# Patient Record
Sex: FEMALE | Race: WHITE | NOT HISPANIC OR LATINO | Employment: UNEMPLOYED | URBAN - METROPOLITAN AREA
[De-identification: names, ages, dates, MRNs, and addresses within clinical notes are randomized per-mention and may not be internally consistent; named-entity substitution may affect disease eponyms.]

---

## 2017-01-03 ENCOUNTER — TELEPHONE (OUTPATIENT)
Dept: FAMILY MEDICINE | Facility: CLINIC | Age: 62
End: 2017-01-03

## 2017-01-03 NOTE — TELEPHONE ENCOUNTER
"01/03/17      Patient declined will call back when ready    Canh \"Pushpa\" Usama  Central Scheduler    "

## 2017-01-11 ENCOUNTER — TELEPHONE (OUTPATIENT)
Dept: FAMILY MEDICINE | Facility: CLINIC | Age: 62
End: 2017-01-11

## 2017-01-11 DIAGNOSIS — G25.81 RESTLESS LEG: Primary | ICD-10-CM

## 2017-01-11 RX ORDER — GABAPENTIN 100 MG/1
CAPSULE ORAL
Qty: 10 CAPSULE | Refills: 0 | Status: SHIPPED | OUTPATIENT
Start: 2017-01-11 | End: 2017-08-24

## 2017-01-11 NOTE — TELEPHONE ENCOUNTER
Reason for Call:  Other prescription    Detailed comments: pt is wanting 5 days worth of medication sent to local pharmacy till her mail order can get her a larger supply   Requesting gabapentin (NEURONTIN) 100 MG capsule    Phone Number Patient can be reached at: Home number on file [phone] (home)    Best Time: any     Can we leave a detailed message on this number? YES    Call taken on 1/11/2017 at 12:46 PM by Candy Cabrera

## 2017-01-11 NOTE — TELEPHONE ENCOUNTER
Prescription approved per [facility] Refill Protocol. For 5 days only.     Left her a message to check with Cub for a refill she requested,   Shantell Julien RNC

## 2017-01-24 ENCOUNTER — OFFICE VISIT (OUTPATIENT)
Dept: OPHTHALMOLOGY | Facility: CLINIC | Age: 62
End: 2017-01-24
Attending: OPHTHALMOLOGY
Payer: MEDICARE

## 2017-01-24 DIAGNOSIS — G35 MULTIPLE SCLEROSIS (H): ICD-10-CM

## 2017-01-24 DIAGNOSIS — H52.13 MYOPIA WITH ASTIGMATISM AND PRESBYOPIA, BILATERAL: ICD-10-CM

## 2017-01-24 DIAGNOSIS — H52.4 MYOPIA WITH ASTIGMATISM AND PRESBYOPIA, BILATERAL: ICD-10-CM

## 2017-01-24 DIAGNOSIS — H52.203 MYOPIA WITH ASTIGMATISM AND PRESBYOPIA, BILATERAL: ICD-10-CM

## 2017-01-24 DIAGNOSIS — H47.393 CUP TO DISC ASYMMETRY, BILATERAL: ICD-10-CM

## 2017-01-24 DIAGNOSIS — B00.52 HERPES KERATITIS: Primary | ICD-10-CM

## 2017-01-24 DIAGNOSIS — H17.9 LEFT CORNEAL SCAR: ICD-10-CM

## 2017-01-24 DIAGNOSIS — H25.13 NUCLEAR SENILE CATARACT OF BOTH EYES: ICD-10-CM

## 2017-01-24 PROCEDURE — 99213 OFFICE O/P EST LOW 20 MIN: CPT | Mod: ZF

## 2017-01-24 PROCEDURE — 92133 CPTRZD OPH DX IMG PST SGM ON: CPT | Mod: ZF | Performed by: OPHTHALMOLOGY

## 2017-01-24 PROCEDURE — 92015 DETERMINE REFRACTIVE STATE: CPT | Mod: ZF

## 2017-01-24 ASSESSMENT — REFRACTION_WEARINGRX
OD_AXIS: 175
OS_CYLINDER: +1.25
OD_CYLINDER: +0.75
OS_ADD: +2.25
OD_ADD: +2.25
OS_AXIS: 175
OS_SPHERE: -7.25
OD_SPHERE: -8.25

## 2017-01-24 ASSESSMENT — SLIT LAMP EXAM - LIDS
COMMENTS: MILD BLEPHARITIS
COMMENTS: MILD BLEPHARITIS

## 2017-01-24 ASSESSMENT — REFRACTION_MANIFEST
OS_ADD: +2.50
OS_SPHERE: -9.00
OS_AXIS: 180
OD_AXIS: 167
OS_CYLINDER: +2.50
OD_CYLINDER: +0.75
OD_SPHERE: -8.00
OD_ADD: +2.50

## 2017-01-24 ASSESSMENT — TONOMETRY
IOP_METHOD: TONOPEN
OS_IOP_MMHG: 09
OD_IOP_MMHG: 10

## 2017-01-24 ASSESSMENT — EXTERNAL EXAM - LEFT EYE: OS_EXAM: NORMAL

## 2017-01-24 ASSESSMENT — CUP TO DISC RATIO
OS_RATIO: 0.5
OD_RATIO: 0.2

## 2017-01-24 ASSESSMENT — CONF VISUAL FIELD
OS_NORMAL: 1
OD_NORMAL: 1

## 2017-01-24 ASSESSMENT — EXTERNAL EXAM - RIGHT EYE: OD_EXAM: NORMAL

## 2017-01-24 ASSESSMENT — VISUAL ACUITY
CORRECTION_TYPE: GLASSES
OS_CC: 20/200
METHOD: SNELLEN - LINEAR
OD_CC: 20/30

## 2017-01-24 NOTE — NURSING NOTE
Chief Complaints and History of Present Illnesses   Patient presents with     New Patient     h/o of left eye herpes     HPI    Symptoms:     Blurred vision   No decreased vision   No floaters   No flashes   Dryness         Do you have eye pain now?:  No      Comments:  New (previous patient) patient is here for annual follow up with h/o left eye herpes.  The patient notes that her vision is stable.  The left eye has blurred vision since the herpes in 2008.  RAMESH Villa 7:56 AM 01/24/2017

## 2017-01-24 NOTE — PROGRESS NOTES
HPI: Tonya Rodriguez is a 61 year old female who presents for complete eye examination. Her last eye exam was in 2008. She denies any changes in her vision, eye pain, redness, new flashes/floaters.    POHx:  HSV keratitis  Optic neuritis    Current Eye Medications:   Valtrex 500 mg daily    Review of Testing:  NA    Assessment & Plan   Tonya Rodriguez is a 61 year old female with the following diagnoses:     1. Herpes keratitis, left eye:  2. Cornea scar, left eye     Patient lost to follow up 7 years ago. Currently taking Valtrex 500 mg qDay.  Patient without active keratitis or stromal involvement.  Discussed referral to cornea service for discussion of possible PKP, but patient deferred today   Continue valtrex as above    3. Myopia with astigmatism and presbyopia, bilateral  Discussed and dispensed new MRx  Discussed RD precautions    4. Nuclear senile sclerosis, bilateral:  Visually non-significant  -Continue to monitor    5. Asymmetric C:D ratio:    History of optic neuritis left eye in the past  No pallor seen, but view limited with K scar  rNFL OCT today for baseline  Intraocular pressure within normal limits today and in the past  Will recheck in 6 mo      Diego Chwodary MD  Ophthalmology, PGY-2         Attending Physician Attestation:  I have seen and examined this patient.  I have confirmed and edited as necessary the chief complaint(s), history of present illness, review of systems, relevant history, and examination findings as documented by others.  I have personally reviewed the relevant tests, images, and reports as documented above.  I have confirmed and edited as necessary the assessment and plan and agree with this note.  - Kush Gutierrez MD 10:52 AM 1/24/2017

## 2017-01-26 NOTE — TELEPHONE ENCOUNTER
Imipramine     Last Written Prescription Date: 10/23/15  Last Fill Quantity: 90, # refills: 3  Last Office Visit with G, P or [facility] prescribing provider: 12/08/16        BP Readings from Last 3 Encounters:   12/08/16 110/69   11/26/16 130/70   10/23/15 113/53     Last PHQ-9 score on record=   PHQ-9 SCORE 12/8/2016   Total Score -   Total Score 19

## 2017-01-30 RX ORDER — IMIPRAMINE HCL 50 MG
50 TABLET ORAL AT BEDTIME
Qty: 90 TABLET | Refills: 3 | OUTPATIENT
Start: 2017-01-30

## 2017-01-30 NOTE — TELEPHONE ENCOUNTER
Routing refill request to provider for review/approval because:  PHQ-9 is above 5 for RN protocol    Thank you  Elizabeth UNDERWOOD RN

## 2017-03-27 ENCOUNTER — TRANSFERRED RECORDS (OUTPATIENT)
Dept: HEALTH INFORMATION MANAGEMENT | Facility: CLINIC | Age: 62
End: 2017-03-27

## 2017-05-02 DIAGNOSIS — G47.00 INSOMNIA: ICD-10-CM

## 2017-05-02 NOTE — TELEPHONE ENCOUNTER
Imipramine  Last Written Prescription Date: 10/23/2015  Last Fill Quantity: 90, # refills: 3  Last Office Visit with [facility], [facility] or [facility] prescribing provider: 12/08/2016        BP Readings from Last 3 Encounters:   12/08/16 110/69   11/26/16 130/70   10/23/15 113/53     Last PHQ-9 score on record=   PHQ-9 SCORE 12/8/2016   Total Score -   Total Score 19

## 2017-05-03 RX ORDER — IMIPRAMINE HCL 50 MG
50 TABLET ORAL AT BEDTIME
Qty: 90 TABLET | Refills: 0 | Status: SHIPPED | OUTPATIENT
Start: 2017-05-03 | End: 2019-01-16

## 2017-05-05 DIAGNOSIS — F33.42 RECURRENT MAJOR DEPRESSION IN COMPLETE REMISSION (H): ICD-10-CM

## 2017-05-05 NOTE — TELEPHONE ENCOUNTER
Fluoxetine     Last Written Prescription Date: 12/26/2016  Last Fill Quantity: 30, # refills: 1  Last Office Visit with [facility] primary care provider:  12/8/2016        Last PHQ-9 score on record=   PHQ-9 SCORE 12/8/2016   Total Score -   Total Score 19

## 2017-05-08 RX ORDER — FLUOXETINE 40 MG/1
40 CAPSULE ORAL DAILY
Qty: 30 CAPSULE | Refills: 1 | Status: SHIPPED | OUTPATIENT
Start: 2017-05-08 | End: 2018-03-23

## 2017-05-09 ENCOUNTER — TELEPHONE (OUTPATIENT)
Dept: FAMILY MEDICINE | Facility: CLINIC | Age: 62
End: 2017-05-09

## 2017-05-09 NOTE — TELEPHONE ENCOUNTER
PHQ9 Due by:7/8/17    Please contact patient to complete follow up PHQ9 before their DUE DATE.     This is important feedback for your care team to monitor how you are doing while taking Prozac.     You completed this same questionnaire   PHQ-9 SCORE 12/8/2016   Total Score -   Total Score 19       MA STAFF: If upon calling patient and PHQ9 score is higher that 10 route to the provider. You may also seek an RN for review.

## 2017-05-11 ASSESSMENT — ANXIETY QUESTIONNAIRES
2. NOT BEING ABLE TO STOP OR CONTROL WORRYING: NOT AT ALL
6. BECOMING EASILY ANNOYED OR IRRITABLE: NOT AT ALL
1. FEELING NERVOUS, ANXIOUS, OR ON EDGE: NOT AT ALL
IF YOU CHECKED OFF ANY PROBLEMS ON THIS QUESTIONNAIRE, HOW DIFFICULT HAVE THESE PROBLEMS MADE IT FOR YOU TO DO YOUR WORK, TAKE CARE OF THINGS AT HOME, OR GET ALONG WITH OTHER PEOPLE: NOT DIFFICULT AT ALL
3. WORRYING TOO MUCH ABOUT DIFFERENT THINGS: NOT AT ALL
5. BEING SO RESTLESS THAT IT IS HARD TO SIT STILL: NOT AT ALL
7. FEELING AFRAID AS IF SOMETHING AWFUL MIGHT HAPPEN: NOT AT ALL
GAD7 TOTAL SCORE: 0

## 2017-05-11 ASSESSMENT — PATIENT HEALTH QUESTIONNAIRE - PHQ9: 5. POOR APPETITE OR OVEREATING: NOT AT ALL

## 2017-05-11 NOTE — TELEPHONE ENCOUNTER
Panel Management Review      Patient has the following on her problem list:     Depression / Dysthymia review  PHQ-9 SCORE 1/14/2014 12/8/2016 5/11/2017   Total Score 2 - -   Total Score - 19 3      Patient is due for:  PHQ9      Composite cancer screening  Chart review shows that this patient is due/due soon for the following None  Summary:    Patient is due/failing the following:   PHQ9    Action needed:   Patient needs to do PHQ9.    Type of outreach:    Phone, spoke to patient.  Completed Phq9/Fahad on 5/11/2017    Questions for provider review:    PLEASE SEND REFILL FOR PROZAC 40 MG CAPSULE TO [employer].  90 dispensed with 3 refills.                                                                                                                                    Alannah Sims CMA (AAMA) 1:43 PM 5/11/2017         Chart routed to Provider .

## 2017-05-12 ASSESSMENT — PATIENT HEALTH QUESTIONNAIRE - PHQ9: SUM OF ALL RESPONSES TO PHQ QUESTIONS 1-9: 3

## 2017-05-12 ASSESSMENT — ANXIETY QUESTIONNAIRES: GAD7 TOTAL SCORE: 0

## 2017-05-12 NOTE — TELEPHONE ENCOUNTER
Patient was instructed to see the provider.  Patient will call back to schedule.    Elizabeth UNDERWOOD RN

## 2017-07-10 ENCOUNTER — OFFICE VISIT (OUTPATIENT)
Dept: FAMILY MEDICINE | Facility: CLINIC | Age: 62
End: 2017-07-10
Payer: COMMERCIAL

## 2017-07-10 VITALS
HEIGHT: 69 IN | BODY MASS INDEX: 23.95 KG/M2 | HEART RATE: 71 BPM | OXYGEN SATURATION: 99 % | WEIGHT: 161.7 LBS | SYSTOLIC BLOOD PRESSURE: 107 MMHG | DIASTOLIC BLOOD PRESSURE: 75 MMHG | TEMPERATURE: 98.3 F

## 2017-07-10 DIAGNOSIS — R21 RASH: Primary | ICD-10-CM

## 2017-07-10 PROCEDURE — 99213 OFFICE O/P EST LOW 20 MIN: CPT | Performed by: INTERNAL MEDICINE

## 2017-07-10 RX ORDER — TRIAMCINOLONE ACETONIDE 1 MG/G
CREAM TOPICAL
Qty: 30 G | Refills: 1 | Status: SHIPPED | OUTPATIENT
Start: 2017-07-10 | End: 2018-03-23

## 2017-07-10 NOTE — PROGRESS NOTES
SUBJECTIVE:                                                    Tonya Rodriguez is a 61 year old female who presents to clinic today for the following health issues:  Chief Complaint   Patient presents with     Shingles     x 3 days, rash mostly on chest/trunk      Rash      Duration: x 3 days     Description  Location: random red spots/sores on chest/trunk.  Patient concerns it could be shingles.    Itching: mild    Intensity:  mild    Accompanying signs and symptoms: burning at the onset.      History (similar episodes/previous evaluation): None    Precipitating or alleviating factors:  New exposures:  None  Recent travel: no      Therapies tried and outcome: none and calamine lotion      Tonya initially developed a spot above the left breast a few days ago and has since developed a few more spots- on in the right groin and a couple on the right abdomen.  They start slightly burning but then get really itchy.      Problem list and histories reviewed & adjusted, as indicated.  Additional history: as documented    Current Outpatient Prescriptions   Medication Sig Dispense Refill     triamcinolone (KENALOG) 0.1 % cream Apply sparingly to affected area three times daily for 14 days. 30 g 1     FLUoxetine (PROZAC) 40 MG capsule Take 1 capsule (40 mg) by mouth daily 30 capsule 1     imipramine (TOFRANIL) 50 MG tablet Take 1 tablet (50 mg) by mouth At Bedtime 90 tablet 0     gabapentin (NEURONTIN) 100 MG capsule Take 1-2 capsules by mouth every night at bedtime as needed for restless legs. 10 capsule 0     simvastatin (ZOCOR) 20 MG tablet Take 1 tablet (20 mg) by mouth At Bedtime 90 tablet 3     valACYclovir (VALTREX) 500 MG tablet Take 1 tablet (500 mg) by mouth daily 90 tablet 3     oxybutynin chloride (DITROPAN XL) 15 MG TB24 Take 1 tablet (15 mg) by mouth 2 times daily 180 tablet 1     traZODone (DESYREL) 50 MG tablet Take 1 tablet by mouth every other night at bedtime. 30 tablet 0     albuterol (PROAIR  "HFA/PROVENTIL HFA/VENTOLIN HFA) 108 (90 BASE) MCG/ACT Inhaler Inhale 2 puffs into the lungs every 6 hours as needed for shortness of breath / dyspnea or wheezing 1 Inhaler 1     ORDER FOR DME Equipment being ordered: urinary catheter 6 times daily 540 catheter 3     [DISCONTINUED] oxybutynin (DITROPAN-XL) 10 MG 24 hr tablet Take 3 tablets (30 mg) by mouth At Bedtime 90 tablet 3     Allergies   Allergen Reactions     Cipro [Ciprofloxacin] Rash     gets yeast infections - BAD with.     Lactulose GI Disturbance     Caused Vomiting       Reviewed and updated as needed this visit by clinical staff  Tobacco  Allergies  Med Hx  Surg Hx  Fam Hx  Soc Hx      Reviewed and updated as needed this visit by Provider         ROS:  Constitutional, HEENT, derm systems are negative, except as otherwise noted.    OBJECTIVE:     /75 (BP Location: Left arm, Patient Position: Chair, Cuff Size: Adult Regular)  Pulse 71  Temp 98.3  F (36.8  C) (Tympanic)  Ht 5' 9\" (1.753 m)  Wt 161 lb 11.2 oz (73.3 kg)  SpO2 99%  BMI 23.88 kg/m2  Body mass index is 23.88 kg/(m^2).  GENERAL: healthy, alert and no distress  SKIN: cluster of papules with one vesicle and one pustule above the left breast.  Papular cluster in right groin and two on the right abdomen.  No facial rash or rash on limbs.     ASSESSMENT/PLAN:       1. Rash    Etiology unclear.  Possible insect bites due to scattered distribution- I advised checking for insects in the home.  She was concerned about shingles, but it is not consistent with that due to distributions.  Does not appear consistent with chicken pox, which she did already have as a child.  She is on Valtrex daily for suppression of herpes infection in the eye.  Does not appear consistent with measles, and she has not traveled to any areas where the measles outbreak is occurring.  Doesn't appears fungal or allergic.  Will treat symptomatically with triamcinolone and OTC antihistamine and continue to monitor.  " If not improving or new symptoms develop, follow-up for further evaluation.      - triamcinolone (KENALOG) 0.1 % cream; Apply sparingly to affected area three times daily for 14 days.  Dispense: 30 g; Refill: 1    Dayne Taylor MD  [facility]

## 2017-07-10 NOTE — NURSING NOTE
"Chief Complaint   Patient presents with     Shingles     x 3 days, rash mostly on chest/trunk        Initial /75 (BP Location: Left arm, Patient Position: Chair, Cuff Size: Adult Regular)  Pulse 71  Temp 98.3  F (36.8  C) (Tympanic)  Ht 5' 9\" (1.753 m)  Wt 161 lb 11.2 oz (73.3 kg)  SpO2 99%  BMI 23.88 kg/m2 Estimated body mass index is 23.88 kg/(m^2) as calculated from the following:    Height as of this encounter: 5' 9\" (1.753 m).    Weight as of this encounter: 161 lb 11.2 oz (73.3 kg).  Medication Reconciliation: complete   Lashell PAZ CMA (AAMA)    "

## 2017-07-10 NOTE — PATIENT INSTRUCTIONS
Self-Care for Skin Rashes     Pat your skin dry. Do not rub.     When your skin reacts to a substance your body is sensitive to, it can cause a rash. You can treat most rashes at home by keeping the skin clean and dry. Many rashes may get better on their own within 2 to 3 days. You may need medical attention if your rash itches, drains, or hurts, particularly if the rash is getting worse.  What can cause a skin rash?    Sun poisoning, caused by too much exposure to the sun    An irritant or allergic reaction to a certain type of food, plant, or chemical, such as  shellfish, poison ivy, and or cleaning products    An infection caused by a fungus (ringworm), virus (chickenpox), or bacteria (strep)    Bites or infestation caused by insects or pests, such as ticks, lice, or mites    Dry skin, which is often seen during the winter months and in older people  How can I control itching and skin damage?    Take soothing lukewarm baths in a colloidal oatmeal product. You can buy this at the [employer].    Do your best not to scratch. Clip fingernails short, especially in young children, to reduce skin damage if scratching does occur.    Use moisturizing skin lotion instead of scratching your dry skin.    Use sunscreen whenever going out into direct sun.    Use only mild cleansing agents whenever possible.    Wash with mild, nonirritating soap and warm water.    Wear clothing that breathes, such as cotton shirts or canvas shoes.    If fluid is seeping from the rash, cover it loosely with clean gauze to absorb the discharge.    Many rashes are contagious. Prevent the rash from spreading to others by washing your hands often before or after touching others with any skin rash.  Use medicine    Antihistamines such as diphenhydramine can help control itching. But use with caution because they can make you drowsy.    Using over-the-counter hydrocortisone cream on small rashes may help reduce swelling and itching    Most  over-the-counter antifungal medicines can treat athlete s foot and many other fungal infections of the skin.  Check with your healthcare provider  Call your healthcare provider if:    You were told that you have a fungal infection on your skin to make sure you have the correct type of medicine.    You have questions or concerns about medicines or their side effects.     Call 911  Call 911 if either of these occur:    Your tongue or lips start to swell    You have difficulty breathing      Call your healthcare provider  Call your healthcare provider if any of these occur:    Temperature of more than 101.0 F (38.3 C), or as directed    Sore throat, a cough, or unusual fatigue    Red, oozy, or painful rash gets worse. These are signs of infection.    Rash covers your face, genitals, or most of your body    Crusty sores or red rings that begin to spread    You were exposed to someone who has a contagious rash, such as scabies or lice.    Red bull s-eye rash with a white center (a sign of Lyme disease)    You were told that you have resistant bacteria (MRSA) on your skin.   Date Last Reviewed: 5/12/2015 2000-2017 [employer]. [address] [place] [address]. All rights reserved. This information is not intended as a substitute for professional medical care. Always follow your healthcare professional's instructions.

## 2017-08-24 ENCOUNTER — OFFICE VISIT (OUTPATIENT)
Dept: FAMILY MEDICINE | Facility: CLINIC | Age: 62
End: 2017-08-24
Payer: COMMERCIAL

## 2017-08-24 ENCOUNTER — TELEPHONE (OUTPATIENT)
Dept: FAMILY MEDICINE | Facility: CLINIC | Age: 62
End: 2017-08-24

## 2017-08-24 VITALS
HEART RATE: 60 BPM | BODY MASS INDEX: 23.99 KG/M2 | SYSTOLIC BLOOD PRESSURE: 116 MMHG | WEIGHT: 162 LBS | TEMPERATURE: 97.6 F | HEIGHT: 69 IN | DIASTOLIC BLOOD PRESSURE: 60 MMHG

## 2017-08-24 DIAGNOSIS — F33.42 RECURRENT MAJOR DEPRESSION IN COMPLETE REMISSION (H): ICD-10-CM

## 2017-08-24 DIAGNOSIS — B00.52 HERPES KERATITIS: ICD-10-CM

## 2017-08-24 DIAGNOSIS — E78.2 MIXED HYPERLIPIDEMIA: ICD-10-CM

## 2017-08-24 DIAGNOSIS — G25.81 RESTLESS LEG: ICD-10-CM

## 2017-08-24 DIAGNOSIS — G35 MULTIPLE SCLEROSIS (H): Primary | ICD-10-CM

## 2017-08-24 PROCEDURE — 99214 OFFICE O/P EST MOD 30 MIN: CPT | Performed by: FAMILY MEDICINE

## 2017-08-24 RX ORDER — FLUOXETINE 10 MG/1
10 CAPSULE ORAL DAILY
Qty: 45 CAPSULE | Refills: 0 | Status: SHIPPED | OUTPATIENT
Start: 2017-08-24 | End: 2017-08-24

## 2017-08-24 RX ORDER — IMIPRAMINE HCL 50 MG
50 TABLET ORAL AT BEDTIME
Qty: 90 TABLET | Refills: 0 | Status: CANCELLED | OUTPATIENT
Start: 2017-08-24

## 2017-08-24 RX ORDER — TRAZODONE HYDROCHLORIDE 50 MG/1
TABLET, FILM COATED ORAL
Qty: 30 TABLET | Refills: 0 | Status: CANCELLED | OUTPATIENT
Start: 2017-08-24

## 2017-08-24 RX ORDER — SIMVASTATIN 20 MG
20 TABLET ORAL AT BEDTIME
Qty: 90 TABLET | Refills: 3 | Status: SHIPPED | OUTPATIENT
Start: 2017-08-24 | End: 2019-05-09

## 2017-08-24 RX ORDER — OXYBUTYNIN CHLORIDE 15 MG/1
15 TABLET, EXTENDED RELEASE ORAL
Qty: 180 TABLET | Refills: 1 | Status: CANCELLED | OUTPATIENT
Start: 2017-08-24

## 2017-08-24 RX ORDER — VALACYCLOVIR HYDROCHLORIDE 500 MG/1
500 TABLET, FILM COATED ORAL DAILY
Qty: 90 TABLET | Refills: 3 | Status: SHIPPED | OUTPATIENT
Start: 2017-08-24 | End: 2018-03-23

## 2017-08-24 RX ORDER — FLUOXETINE 10 MG/1
10 CAPSULE ORAL DAILY
Qty: 135 CAPSULE | Refills: 3 | Status: SHIPPED | OUTPATIENT
Start: 2017-08-24 | End: 2017-08-24

## 2017-08-24 RX ORDER — FLUOXETINE 10 MG/1
10 CAPSULE ORAL EVERY OTHER DAY
Qty: 45 CAPSULE | Refills: 0 | Status: SHIPPED | OUTPATIENT
Start: 2017-08-24 | End: 2017-08-25

## 2017-08-24 RX ORDER — FLUOXETINE 40 MG/1
40 CAPSULE ORAL DAILY
Qty: 30 CAPSULE | Refills: 1 | Status: CANCELLED | OUTPATIENT
Start: 2017-08-24

## 2017-08-24 RX ORDER — GABAPENTIN 100 MG/1
CAPSULE ORAL
Qty: 180 CAPSULE | Refills: 3 | Status: SHIPPED | OUTPATIENT
Start: 2017-08-24 | End: 2019-01-16

## 2017-08-24 ASSESSMENT — PATIENT HEALTH QUESTIONNAIRE - PHQ9: SUM OF ALL RESPONSES TO PHQ QUESTIONS 1-9: 3

## 2017-08-24 NOTE — MR AVS SNAPSHOT
After Visit Summary   8/24/2017    Tonya Rodriguez    MRN: [id number]           Patient Information     Date Of Birth          1955        Visit Information        Provider Department      8/24/2017 9:20 AM Yasmin Crowell MD [facility]        Today's Diagnoses     Recurrent major depression in complete remission (H)        Restless leg        Mixed hyperlipidemia        Herpes keratitis          Care Instructions          Thank you for choosing [facility].  You may be receiving a survey in the mail from [facility] regarding your visit today.  Please take a few minutes to complete and return the survey to let us know how we are doing.      If you have questions or concerns, please contact us via [employer] or you can contact your care team at [phone].    Our Clinic hours are:  Monday 6:40 am  to 7:00 pm  Tuesday -Friday 6:40 am to 5:00 pm    The [place] outpatient lab hours are:  Monday - Friday 6:10 am to 4:45 pm  Saturdays 7:00 am to 11:00 am  Appointments are required, call [phone]    If you have clinical questions after hours or would like to schedule an appointment,  call the clinic at [phone].  The 10-year ASCVD risk score (Rhett AUGUSTA Jr, et al., 2013) is: 6.8%    Values used to calculate the score:      Age: 62 years      Sex: Female      Is Non- : No      Diabetic: No      Tobacco smoker: Yes      Systolic Blood Pressure: 116 mmHg      Is BP treated: No      HDL Cholesterol: 48 mg/dL      Total Cholesterol: 183 mg/dL            Follow-ups after your visit        Who to contact     If you have questions or need follow up information about today's clinic visit or your schedule please contact [facility] directly at [phone].  Normal or non-critical lab and imaging results will be communicated to you by MyChart, letter or phone within 4 business days after the clinic has received the results. If you do not hear from  "us within 7 days, please contact the clinic through Private[employer]Me or phone. If you have a critical or abnormal lab result, we will notify you by phone as soon as possible.  Submit refill requests through Private[employer]Me or call your pharmacy and they will forward the refill request to us. Please allow 3 business days for your refill to be completed.          Additional Information About Your Visit        [employer] Information     Private.Me lets you send messages to your doctor, view your test results, renew your prescriptions, schedule appointments and more. To sign up, go to [email or web address] . Click on \"Log in\" on the left side of the screen, which will take you to the Welcome page. Then click on \"Sign up Now\" on the right side of the page.     You will be asked to enter the access code listed below, as well as some personal information. Please follow the directions to create your username and password.     Your access code is: [email or web address]  Expires: 10/8/2017 11:22 AM     Your access code will  in 90 days. If you need help or a new code, please call your [place] clinic or [phone].        Care EveryWhere ID     This is your Care EveryWhere ID. This could be used by other organizations to access your [place] medical records  [id number]        Your Vitals Were     Pulse Temperature Height BMI (Body Mass Index)          60 97.6  F (36.4  C) (Tympanic) 5' 9\" (1.753 m) 23.92 kg/m2         Blood Pressure from Last 3 Encounters:   17 116/60   07/10/17 107/75   16 110/69    Weight from Last 3 Encounters:   17 162 lb (73.5 kg)   07/10/17 161 lb 11.2 oz (73.3 kg)   16 165 lb 6.4 oz (75 kg)              Today, you had the following     No orders found for display         Today's Medication Changes          These changes are accurate as of: 17 10:00 AM.  If you have any questions, ask your nurse or doctor.               These medicines have changed or have updated prescriptions.        " Dose/Directions    * FLUoxetine 40 MG capsule   Commonly known as:  PROzac   This may have changed:  Another medication with the same name was added. Make sure you understand how and when to take each.   Used for:  Recurrent major depression in complete remission (H)        Dose:  40 mg   Take 1 capsule (40 mg) by mouth daily   Quantity:  30 capsule   Refills:  1       * FLUoxetine 10 MG capsule   Commonly known as:  PROzac   This may have changed:  You were already taking a medication with the same name, and this prescription was added. Make sure you understand how and when to take each.   Used for:  Recurrent major depression in complete remission (H)        Dose:  10 mg   Take 1 capsule (10 mg) by mouth daily Every other day will take 20 mg  (2 caps)   Quantity:  45 capsule   Refills:  0       * Notice:  This list has 2 medication(s) that are the same as other medications prescribed for you. Read the directions carefully, and ask your doctor or other care provider to review them with you.         Where to get your medicines      These medications were sent to [id number] - [place], [place] - [address]  [address] [address]     Phone:  [phone]     FLUoxetine 10 MG capsule         These medications were sent to [facility] [address] [facility]  [address] [facility] [address]     Phone:  [phone]     gabapentin 100 MG capsule    simvastatin 20 MG tablet    valACYclovir 500 MG tablet                Primary Care Provider Office Phone # Fax #    Yasmin Crowell [phone] [address] [facility] [address]        Equal Access to Services     [facility] Center[facility] : Deon Serrano, wajonathan lusharonda, qaybta jade hardin. So [facility] [phone].    ATENCIÓN: Si habla español, tiene a santiago disposición servicios  soledad de asistencia lingüística. Katerina ahumada [phone].    We comply with applicable federal civil rights laws and [place] laws. We do not discriminate on the basis of race, color, national origin, age, disability sex, sexual orientation or gender identity.            Thank you!     Thank you for choosing [facility]  for your care. Our goal is always to provide you with excellent care. Hearing back from our patients is one way we can continue to improve our services. Please take a few minutes to complete the written survey that you may receive in the mail after your visit with us. Thank you!             Your Updated Medication List - Protect others around you: Learn how to safely use, store and throw away your medicines at www.disposemymeds.org.          This list is accurate as of: 8/24/17 10:00 AM.  Always use your most recent med list.                   Brand Name Dispense Instructions for use Diagnosis    albuterol 108 (90 BASE) MCG/ACT Inhaler    PROAIR HFA/PROVENTIL HFA/VENTOLIN HFA    1 Inhaler    Inhale 2 puffs into the lungs every 6 hours as needed for shortness of breath / dyspnea or wheezing    Acute bronchospasm       * FLUoxetine 40 MG capsule    PROzac    30 capsule    Take 1 capsule (40 mg) by mouth daily    Recurrent major depression in complete remission (H)       * FLUoxetine 10 MG capsule    PROzac    45 capsule    Take 1 capsule (10 mg) by mouth daily Every other day will take 20 mg  (2 caps)    Recurrent major depression in complete remission (H)       gabapentin 100 MG capsule    NEURONTIN    180 capsule    Take 1-2 capsules by mouth every night at bedtime as needed for restless legs.    Restless leg       imipramine 50 MG tablet    TOFRANIL    90 tablet    Take 1 tablet (50 mg) by mouth At Bedtime    Insomnia       order for [facility]     540 catheter    Equipment being ordered: urinary catheter 6 times daily    Personal history of other disorder of urinary system, Multiple  sclerosis (H)       oxybutynin chloride 15 MG Tb24    DITROPAN XL    180 tablet    Take 1 tablet (15 mg) by mouth 2 times daily    Urinary incontinence       simvastatin 20 MG tablet    ZOCOR    90 tablet    Take 1 tablet (20 mg) by mouth At Bedtime    Mixed hyperlipidemia       traZODone 50 MG tablet    DESYREL    30 tablet    Take 1 tablet by mouth every other night at bedtime.    Problems related to lack of adequate sleep, Depressive disorder, not elsewhere classified, Backache, unspecified, Esophageal reflux, Multiple sclerosis (H), Migraine, unspecified, with intractable migraine, so stated, without mention of status migrainosus, Hand pain, Preventative health care       triamcinolone 0.1 % cream    KENALOG    30 g    Apply sparingly to affected area three times daily for 14 days.    Rash       valACYclovir 500 MG tablet    VALTREX    90 tablet    Take 1 tablet (500 mg) by mouth daily    Herpes keratitis       * Notice:  This list has 2 medication(s) that are the same as other medications prescribed for you. Read the directions carefully, and ask your doctor or other care provider to review them with you.

## 2017-08-24 NOTE — TELEPHONE ENCOUNTER
Prozac 10 mg one every other day, two on opposite day.  Sent to mail order for a year, only one month for now, as takes awhile to get mail order.

## 2017-08-24 NOTE — NURSING NOTE
"Chief Complaint   Patient presents with     Lipids     Refill Request     Medications pending       Initial /60 (BP Location: Left arm, Patient Position: Sitting, Cuff Size: Adult Regular)  Pulse 60  Temp 97.6  F (36.4  C) (Tympanic)  Ht 5' 9\" (1.753 m)  Wt 162 lb (73.5 kg)  BMI 23.92 kg/m2 Estimated body mass index is 23.92 kg/(m^2) as calculated from the following:    Height as of this encounter: 5' 9\" (1.753 m).    Weight as of this encounter: 162 lb (73.5 kg).  Medication Reconciliation: complete    "

## 2017-08-24 NOTE — PROGRESS NOTES
SUBJECTIVE:   Tonya Rodriguez is a 62 year old female who presents to clinic today for the following health issues:      Hyperlipidemia Follow-Up      Rate your low fat/cholesterol diet?: poor    Taking statin?  Yes, no muscle aches from statin    Other lipid medications/supplements?:  none        Amount of exercise or physical activity: 2-3 days/week for an average of 15-30 minutes    Problems taking medications regularly: No    Medication side effects: none  Diet: regular (no restrictions) and breakfast skipped      Problem list and histories reviewed & adjusted, as indicated.  Additional history: needs all her medications refilled, will get some from specialists who prescribed them.    Patient Active Problem List   Diagnosis     Multiple sclerosis (H)     Allergic rhinitis     Other chronic allergic conjunctivitis     Esophageal reflux     Intractable migraine     Herpes simplex with other ophthalmic complications     Personal history of other disorder of urinary system     Dysphagia     Periodic limb movement sleep disorder     Hyperlipidemia with target LDL less than 130     S/P revision of total hip  RIGHT     Osteoarthritis of hip     Status post THR (total hip replacement)     Recurrent major depression in complete remission (H)     Urinary incontinence     Restless leg     Screening for breast cancer     Herpes keratitis     Advanced directives, counseling/discussion     Family history of rheumatoid arthritis     Speech dysfluency     Self-catheterizes urinary bladder     Past Surgical History:   Procedure Laterality Date     APPENDECTOMY  1980's    Retained suture     ARTHROPLASTY HIP  9/19/2012    Procedure: ARTHROPLASTY HIP;  Right Total Hip Minimally Invasive Surgery;  Surgeon: Devendra Douglas MD;  Location: [facility]     ARTHROSCOPY KNEE RT/LT  1989    Arthroscopy of the Left Knee     BUNIONECTOMY RT/LT      Bilateral bunionectomies x4 surgeries     EXCISE LESION TRUNK N/A 4/17/2015    Procedure:  "EXCISE LESION TRUNK;  Surgeon: Wander Gutierrez MD;  Location: [facility]     SURGICAL HISTORY OF -   1974, 1977    two normal deliveries     TUBAL LIGATION  1981-82       Social History   Substance Use Topics     Smoking status: Current Every Day Smoker     Packs/day: 1.00     Years: 45.00     Types: Cigarettes     Smokeless tobacco: Never Used     Alcohol use No     Family History   Problem Relation Age of Onset     Hypertension Mother      HEART DISEASE Mother      CANCER Father      lung     HEART DISEASE Father      DIABETES Father      HEART DISEASE Maternal Grandfather      CANCER Sister      cervical     HEART DISEASE Son      mummer     Alcohol/Drug Brother      Genitourinary Problems Brother      stones     Coronary Artery Disease Brother      injury \" maker\" tree fell on him         Current Outpatient Prescriptions   Medication Sig Dispense Refill     gabapentin (NEURONTIN) 100 MG capsule Take 1-2 capsules by mouth every night at bedtime as needed for restless legs. 180 capsule 3     simvastatin (ZOCOR) 20 MG tablet Take 1 tablet (20 mg) by mouth At Bedtime 90 tablet 3     valACYclovir (VALTREX) 500 MG tablet Take 1 tablet (500 mg) by mouth daily 90 tablet 3     triamcinolone (KENALOG) 0.1 % cream Apply sparingly to affected area three times daily for 14 days. 30 g 1     FLUoxetine (PROZAC) 40 MG capsule Take 1 capsule (40 mg) by mouth daily 30 capsule 1     imipramine (TOFRANIL) 50 MG tablet Take 1 tablet (50 mg) by mouth At Bedtime 90 tablet 0     albuterol (PROAIR HFA/PROVENTIL HFA/VENTOLIN HFA) 108 (90 BASE) MCG/ACT Inhaler Inhale 2 puffs into the lungs every 6 hours as needed for shortness of breath / dyspnea or wheezing 1 Inhaler 1     oxybutynin chloride (DITROPAN XL) 15 MG TB24 Take 1 tablet (15 mg) by mouth 2 times daily 180 tablet 1     ORDER FOR DME Equipment being ordered: urinary catheter 6 times daily 540 catheter 3     traZODone (DESYREL) 50 MG tablet Take 1 tablet by mouth every " "other night at bedtime. 30 tablet 0     FLUoxetine (PROZAC) 10 MG capsule Take 10 mg by mouth every other day and 20 mg by mouth on opposite days. 15 capsule 0     FLUoxetine (PROZAC) 20 MG capsule Take 10 mg by mouth every other day and 20 mg by mouth on opposite days. 15 capsule 0     [DISCONTINUED] oxybutynin (DITROPAN-XL) 10 MG 24 hr tablet Take 3 tablets (30 mg) by mouth At Bedtime 90 tablet 3     Allergies   Allergen Reactions     Cipro [Ciprofloxacin] Rash     gets yeast infections - BAD with.     Lactulose GI Disturbance     Caused Vomiting     Recent Labs   Lab Test  02/06/15   0943  09/19/13   0848  01/26/12   0812  04/12/11   0921  08/18/10   0946   A1C   --    --    --   6.0   --    LDL  113  109  116  192*  184*   HDL  48*  34*  45*  42*  42*   TRIG  108  165*  167*  133  183*   ALT  21   --   21  19  13   CR  0.86  0.91   --   1.01  0.86   GFRESTIMATED  67  64   --   57*  69   GFRESTBLACK  81  77   --   69  83   POTASSIUM  3.5  4.1   --   4.2  4.3   TSH   --    --    --    --   1.09      BP Readings from Last 3 Encounters:   08/24/17 116/60   07/10/17 107/75   12/08/16 110/69    Wt Readings from Last 3 Encounters:   08/24/17 162 lb (73.5 kg)   07/10/17 161 lb 11.2 oz (73.3 kg)   12/08/16 165 lb 6.4 oz (75 kg)         ROS:  Constitutional, HEENT, cardiovascular, pulmonary, gi and gu systems are negative, except as otherwise noted.    OBJECTIVE:                                                    /60 (BP Location: Left arm, Patient Position: Sitting, Cuff Size: Adult Regular)  Pulse 60  Temp 97.6  F (36.4  C) (Tympanic)  Ht 5' 9\" (1.753 m)  Wt 162 lb (73.5 kg)  BMI 23.92 kg/m2  GENERAL APPEARANCE ADULT: Alert, no acute distress, cooperative, smiling, tired appearing, slight tremor, walks unsuredly  HENT: Ears and TMs normal, oral mucosa and posterior oropharynx normal  RESP: lungs clear to auscultation   CV: normal rate, regular rhythm, no murmur or gallop  NEURO: speech slow but improved from " previous visits, gait abnormal  PSYCH: mentation appears normal., dress, grooming and hygeine smells of urine  Diagnostic Test Results:  PHQ-9 Interpretation:  0-9  Not Major Depression  10-19  Mild/Moderate Depression monthly contacts, meds and therapy  20-27  Severe Depression, weekly contacts, meds and therapy  PHQ-9 SCORE 12/8/2016 5/11/2017 8/24/2017   Total Score - - -   Total Score 19 3 3   GAD7 score   Interpretation:    0-5  mild anxiety,   6-10 moderate anxiety   11-15 severe anxiety  Last 3 GAD7 scores on record =  MARISELA-7 SCORE 1/15/2014 12/8/2016 5/11/2017   Total Score 1 - -   Total Score - 4 0                   ASSESSMENT/PLAN:                                                    1. Recurrent major depression in complete remission (H)  On prozac and stable, does not want to stop or change, PHQ9 score 3 today    2. Restless leg  due for review and refill, taking medication without difficulty  - gabapentin (NEURONTIN) 100 MG capsule; Take 1-2 capsules by mouth every night at bedtime as needed for restless legs.  Dispense: 180 capsule; Refill: 3    3. Mixed hyperlipidemia  due for review and refill, taking medication without difficulty  - simvastatin (ZOCOR) 20 MG tablet; Take 1 tablet (20 mg) by mouth At Bedtime  Dispense: 90 tablet; Refill: 3    4. Herpes keratitis  due for review and refill, taking medication without difficulty  - valACYclovir (VALTREX) 500 MG tablet; Take 1 tablet (500 mg) by mouth daily  Dispense: 90 tablet; Refill: 3    5. Multiple sclerosis (H)  Neurology managing    Yasmin [place] MD  [facility]

## 2017-08-24 NOTE — PATIENT INSTRUCTIONS
Thank you for choosing [facility].  You may be receiving a survey in the mail from Pete Richardson regarding your visit today.  Please take a few minutes to complete and return the survey to let us know how we are doing.      If you have questions or concerns, please contact us via Qui[employer] or you can contact your care team at [phone].    Our Clinic hours are:  Monday 6:40 am  to 7:00 pm  Tuesday -Friday 6:40 am to 5:00 pm    The [place] outpatient lab hours are:  Monday - Friday 6:10 am to 4:45 pm  Saturdays 7:00 am to 11:00 am  Appointments are required, call [phone]    If you have clinical questions after hours or would like to schedule an appointment,  call the clinic at [phone].  The 10-year ASCVD risk score (Rhettdoe DERAS Jr, et al., 2013) is: 6.8%    Values used to calculate the score:      Age: 62 years      Sex: Female      Is Non- : No      Diabetic: No      Tobacco smoker: Yes      Systolic Blood Pressure: 116 mmHg      Is BP treated: No      HDL Cholesterol: 48 mg/dL      Total Cholesterol: 183 mg/dL

## 2017-08-25 ENCOUNTER — TELEPHONE (OUTPATIENT)
Dept: FAMILY MEDICINE | Facility: CLINIC | Age: 62
End: 2017-08-25

## 2017-08-25 DIAGNOSIS — F33.42 RECURRENT MAJOR DEPRESSION IN COMPLETE REMISSION (H): ICD-10-CM

## 2017-08-25 RX ORDER — FLUOXETINE 10 MG/1
CAPSULE ORAL
Qty: 15 CAPSULE | Refills: 0 | Status: SHIPPED | OUTPATIENT
Start: 2017-08-25 | End: 2019-01-16

## 2017-08-25 NOTE — TELEPHONE ENCOUNTER
Prozac 20 mg     Last Written Prescription Date: 8/24/2017  Last Fill Quantity: 45, # refills: 0  Last Office Visit with [facility] primary care provider:  8/24/2017        Last PHQ-9 score on record=   PHQ-9 SCORE 8/24/2017   Total Score -   Total Score 3       Insurance wont cover 2 pills per day so they need an Rx for 20 mg.

## 2017-08-25 NOTE — TELEPHONE ENCOUNTER
We have been contacted by [facility] pharmacy. The pt's insurance will not cover two pills per day. They are requesting that we place a new prescription.  I have contacted the pharmacy, they have suggested ordering two different strengths of this medication to take on opposite day. The medication was ordered in capsules and can not be split. The pharmacy tech that I talked to suggested two different strengths and not just changing to tablets, as she thought that they would be covered differently. This change is only for the one month prescription that has been sent to [facility]. I have cued up the requested change for review.   Please review and advise.   Thank you,  Missy Hernandez RN

## 2017-08-27 ENCOUNTER — TELEPHONE (OUTPATIENT)
Dept: FAMILY MEDICINE | Facility: CLINIC | Age: 62
End: 2017-08-27

## 2017-12-13 ENCOUNTER — TELEPHONE (OUTPATIENT)
Dept: FAMILY MEDICINE | Facility: CLINIC | Age: 62
End: 2017-12-13

## 2017-12-13 NOTE — TELEPHONE ENCOUNTER
Panel Management Review    Composite cancer screening  Chart review shows that this patient is due/due soon for the following Colonoscopy and Fecal Colorectal (FIT)  Summary:    Patient is due/failing the following:   COLONOSCOPY and FIT    Action needed:   Patient needs referral/order: colonoscopy/FIT test    Type of outreach:    Sent letter.    Questions for provider review:    None                                                                                                                                    Mary Oliveros CMA..........12/13/2017 3:47 PM

## 2017-12-13 NOTE — LETTER
[facility]  [address] [address]  [phone]        December 13, 2017    Tonya Rodriguez  [address] [place] [id number]    Dear Tonya,    I care about your health and have reviewed your health plan. I have reviewed your medical conditions, medication list, and lab results and am making recommendations based on this review, to better manage your health.    You are in particular need of attention regarding:  -Colon Cancer Screening    I am recommending that you:    -schedule a COLONOSCOPY to look for colon cancer (due every 10 years or 5 years in higher risk situations.)   Colon cancer is now the second leading cause of death in the [place] for both men and women and there are over 130,000 new cases and 50,000 deaths per year from colon cancer.  Colonoscopies can prevent 90-95% of these deaths.  Problem lesions can be removed before they ever become cancer.  This test is not only looking for cancer, but also getting rid of precancerious lesions.  If you do not wish to do a colonoscopy or cannot afford to do one, at this time, there is another option. It is called a FIT test or Fecal Immunochemical Occult Blood Test (take home stool sample kit).  It does not replace the colonoscopy for colorectal cancer screening, but it can detect hidden bleeding in the lower colon.  It does need to be repeated every year and if a positive result is obtained, you would be referred for a colonoscopy.  If you have completed either one of these tests at another facility, please have the records sent to our clinic so that we can best coordinate your care.    Here is a list of Health Maintenance topics that are due now or due soon:  Health Maintenance Due   Topic Date Due     MIGRAINE ACTION PLAN  07/20/1973     COLON CANCER SCREEN (SYSTEM ASSIGNED)  07/20/2005     INFLUENZA VACCINE (SYSTEM ASSIGNED)  09/01/2017       Please call us at [phone]  (or use LoveThish[employer]) to address the above recommendations.     Thank you for trusting [facility] and we appreciate the opportunity to serve you.  We look forward to supporting your healthcare needs in the future.    Healthy Regards,    Dr. Yasmin [place] & Care Team/rose

## 2017-12-28 ENCOUNTER — TELEPHONE (OUTPATIENT)
Dept: FAMILY MEDICINE | Facility: CLINIC | Age: 62
End: 2017-12-28

## 2017-12-28 DIAGNOSIS — R32 URINARY INCONTINENCE: Primary | ICD-10-CM

## 2017-12-28 DIAGNOSIS — R32 URINARY INCONTINENCE, UNSPECIFIED TYPE: ICD-10-CM

## 2018-01-03 DIAGNOSIS — R32 URINARY INCONTINENCE, UNSPECIFIED TYPE: ICD-10-CM

## 2018-01-03 RX ORDER — OXYBUTYNIN CHLORIDE 15 MG/1
15 TABLET, EXTENDED RELEASE ORAL
Qty: 60 TABLET | Refills: 0 | Status: SHIPPED | OUTPATIENT
Start: 2018-01-03 | End: 2018-01-29

## 2018-01-03 RX ORDER — OXYBUTYNIN CHLORIDE 15 MG/1
15 TABLET, EXTENDED RELEASE ORAL
Qty: 180 TABLET | Refills: 1 | Status: SHIPPED | OUTPATIENT
Start: 2018-01-03 | End: 2018-01-03

## 2018-01-03 NOTE — TELEPHONE ENCOUNTER
Requested Prescriptions   Pending Prescriptions Disp Refills     oxybutynin chloride (DITROPAN XL) 15 MG TB24 180 tablet 1     Sig: Take 1 tablet (15 mg) by mouth 2 times daily    Muscarinic Antagonists (Urinary Incontinence Agents) Passed    12/28/2017  8:28 AM       Passed - Recent or future visit with authorizing provider's specialty    Patient had office visit in the last year or has a visit in the next 30 days with authorizing provider.  See chart review.              Passed - Medication is Oxybutynin and patient is 5 years of age or older       Passed - Patient does not have a diagnosis of glaucoma on the problem list    If glaucoma diagnosis is new, refer refill to physician.         Passed - Patient is 18 years of age or older        Routing refill request to provider for review/approval because:  Drug interaction warning---High dose warning    Dimple RAMEY Rn

## 2018-01-03 NOTE — TELEPHONE ENCOUNTER
Reason for Call:  Medication or medication refill:    Do you use a [facility]?  Name of the pharmacy and phone number for the current request:  [facility] [place] [phone]    Name of the medication requested: Oxybutynin - Pt asking for 1 mo supply to get her through until she gets her refill from mail order pharmacy.  No need to call patient back, unless there are questions or problems.      Other request:     Can we leave a detailed message on this number? YES    Phone number patient can be reached at: Home number on file [phone] (home)    Best Time: any    Call taken on 1/3/2018 at 1:46 PM by Sheri Zaldivar

## 2018-01-05 NOTE — TELEPHONE ENCOUNTER
This medication is not appropriate, often toxic on it.  Please do not fill.  She will need to see provider for alternative treatment.

## 2018-01-05 NOTE — TELEPHONE ENCOUNTER
Yasmin [place] MD   [facility] [place] [facility] Yesterday (11:56 AM)                 If this was filled should not be. Please verify what this note is about. (Routing comment)

## 2018-01-05 NOTE — TELEPHONE ENCOUNTER
Oxybutynin was refilled 1-3-18 - by Dr. Cole.  Patient had requested a small supply sent to local pharmacy while she waits for mail order (mail order refill was placed same day 1-3-18 by Dr. Jack - shows as d/c now due to #60 sent to local pharm).  She is taking this medication - 15mg BID and has been for years.    Routing to provider.  Geraldine RAMEY RN

## 2018-01-05 NOTE — TELEPHONE ENCOUNTER
Asked her to make appt to come in and talk to MD about this dose. She has been on it for years.   Pattie Maier RN

## 2018-01-29 DIAGNOSIS — R32 URINARY INCONTINENCE, UNSPECIFIED TYPE: ICD-10-CM

## 2018-01-30 NOTE — TELEPHONE ENCOUNTER
"Requested Prescriptions   Pending Prescriptions Disp Refills     oxybutynin chloride 15 MG TB24 [Pharmacy Med Name: Oxybutynin Chloride ER Oral Tablet Extended Release 24 Hour 15 MG]  Last Written Prescription Date:  01/03/2017  Last Fill Quantity: 60,  # refills: 0   Last Office Visit with [facility] provider:  08/24/2017   Future Office Visit:      60 tablet 0     Sig: TAKE 1 TABLET (15 MG) BY MOUTH 2 TIMES DAILY    Muscarinic Antagonists (Urinary Incontinence Agents) Passed    1/29/2018  2:22 PM       Passed - Recent or future visit with authorizing provider's specialty    Patient had office visit in the last year or has a visit in the next 30 days with authorizing provider.  See \"Patient Info\" tab in inbasket, or \"Choose Columns\" in Meds & Orders section of the refill encounter.            Passed - Medication is Oxybutynin and patient is 5 years of age or older       Passed - Patient does not have a diagnosis of glaucoma on the problem list    If glaucoma diagnosis is new, refer refill to physician.         Passed - Patient is 18 years of age or older          "

## 2018-01-30 NOTE — TELEPHONE ENCOUNTER
Pt calling stating she is out in [place] until the beginning of March. She is wondering if she can get a 30 day refill sent and she will come in if needed for an appt. She is out of this medication and said [facility] as soon as they get the refill ok'd.    Allyssa [facility]

## 2018-01-31 RX ORDER — OXYBUTYNIN CHLORIDE 15 MG/1
TABLET, EXTENDED RELEASE ORAL
Qty: 180 TABLET | Refills: 1 | Status: SHIPPED | OUTPATIENT
Start: 2018-01-31 | End: 2018-09-16

## 2018-01-31 RX ORDER — OXYBUTYNIN CHLORIDE 15 MG/1
TABLET, EXTENDED RELEASE ORAL
Qty: 60 TABLET | Refills: 0 | Status: SHIPPED | OUTPATIENT
Start: 2018-01-31 | End: 2018-01-31

## 2018-03-23 ENCOUNTER — OFFICE VISIT (OUTPATIENT)
Dept: OPHTHALMOLOGY | Facility: CLINIC | Age: 63
End: 2018-03-23
Attending: OPHTHALMOLOGY
Payer: MEDICARE

## 2018-03-23 DIAGNOSIS — H01.016 ULCERATIVE BLEPHARITIS OF BOTH EYES, UNSPECIFIED EYELID: ICD-10-CM

## 2018-03-23 DIAGNOSIS — H17.9 LEFT CORNEAL SCAR: Primary | ICD-10-CM

## 2018-03-23 DIAGNOSIS — H01.013 ULCERATIVE BLEPHARITIS OF BOTH EYES, UNSPECIFIED EYELID: ICD-10-CM

## 2018-03-23 PROCEDURE — G0463 HOSPITAL OUTPT CLINIC VISIT: HCPCS | Mod: ZF

## 2018-03-23 RX ORDER — VALACYCLOVIR HYDROCHLORIDE 500 MG/1
500 TABLET, FILM COATED ORAL DAILY
Qty: 90 TABLET | Refills: 3 | Status: SHIPPED | OUTPATIENT
Start: 2018-03-23 | End: 2019-06-10

## 2018-03-23 ASSESSMENT — VISUAL ACUITY
OS_CC: 20/200
OD_CC+: -2
CORRECTION_TYPE: GLASSES
OS_PH_CC: 20/150-
OD_CC: 20/30
METHOD: SNELLEN - LINEAR

## 2018-03-23 ASSESSMENT — SLIT LAMP EXAM - LIDS: COMMENTS: MILD BLEPHARITIS, MGD

## 2018-03-23 ASSESSMENT — TONOMETRY
IOP_METHOD: ICARE
OS_IOP_MMHG: 10
OD_IOP_MMHG: 13

## 2018-03-23 ASSESSMENT — EXTERNAL EXAM - RIGHT EYE: OD_EXAM: NORMAL

## 2018-03-23 ASSESSMENT — EXTERNAL EXAM - LEFT EYE: OS_EXAM: NORMAL

## 2018-03-23 NOTE — PROGRESS NOTES
Interval history:   Patient reports swelling around right eye that started a few days ago. She reports these were the initial symptoms of herpetic infection in her left eye and wanted to check out her right eye. She states she has mild dull pain in right eye, but denies photophobia. Mild redness of eye but visual acuity has stayed at baseline during this time. No ocular complaints in left eye. Patient ran out of valtrex 1 month ago, previously on 500 mg daily.     HPI: Tonya Rodriguez is a 61 year old female who presents for complete eye examination. Her last eye exam was in 2008. She denies any changes in her vision, eye pain, redness, new flashes/floaters.    POHx:  HSV keratitis  Optic neuritis    Current Eye Medications:   None       Assessment & Plan   Tonya Rodriguez is a 61 year old female with the following diagnoses:   1. Blepharitis  Plan:  Lid scrubs  Warm compresses twice a day   Fish oil 1000mg qday  PF AT four times a day  And as needed thereafter    2. Herpes keratitis, left eye:  3. Cornea scar, left eye     Patient lost to follow up 8 years ago.   -Restart valtrex 500 mg q day for maintenance   -Vision may improve with CL fitting, visual axis relatively clear. Referral Dr. Oneill for CL evaluation   Patient without active keratitis or stromal involvement.  Discussed referral to cornea service for discussion of possible PKP, but patient deferred today   Continue valtrex as above    3. Asymmetric C:D ratio:    History of optic neuritis left eye in the past  No pallor seen, but view limited with K scar  Intraocular pressure within normal limits today and in the past      F/u 1-2 months Dr. Oneill contact lens evaluation. F/u with Dr. Gutierrez 4 months with OCT retinal nerve fiber layer       Keren Anaya MD  Ophthalmology PGY3       ~~~~~~~~~~~~~~~~~~~~~~~~~~~~~~~~~~~~~~~~~~~~~~~~~~~~~~~~~~~~~~~~    Complete documentation of historical and exam elements from today's encounter can be found in the  full encounter summary report (not reduplicated in this progress note). I personally obtained the chief complaint(s) and history of present illness.  I confirmed and edited as necessary the review of systems, past medical/surgical history, family history, social history, and examination findings as documented by others.  I examined the patient myself, and I personally reviewed the relevant tests, images, and reports as documented above. I formulated and edited as necessary the assessment and plan and discussed the findings and management plan with the patient and family.     Diego Berry MD, MA  Director, Cornea & Anterior Segment  [facility] Department of Ophthalmology & Visual Neuroscience

## 2018-03-23 NOTE — MR AVS SNAPSHOT
After Visit Summary   3/23/2018    Tonya Rodriguez    MRN: [id number]           Patient Information     Date Of Birth          1955        Visit Information        Provider Department      3/23/2018 8:00 AM [facility] Anaya MD [facility]        Today's Diagnoses     Left corneal scar    -  1    Ulcerative blepharitis of both eyes, unspecified eyelid          Care Instructions    Instructions:   Artificial tears three-four times daily  Fish oil 1 g per day  Warm compresses TWO times daily  Lid scrubs with baby shampoo     Follow up with Dr. Oneill 1-2 months and Dr. Gutierrez 4 months           Follow-ups after your visit        Follow-up notes from your care team     Return for Follow Up 1-2 months cj Smith 4 mo  .      Who to contact     Please call your clinic at [phone] to:    Ask questions about your health    Make or cancel appointments    Discuss your medicines    Learn about your test results    Speak to your doctor            Additional Information About Your Visit        MyChart Information     FundRazr is an electronic gateway that provides easy, online access to your medical records. With FundRazr, you can request a clinic appointment, read your test results, renew a prescription or communicate with your care team.     To sign up for [employer] visit the website at [email or web address]   You will be asked to enter the access code listed below, as well as some personal information. Please follow the directions to create your username and password.     Your access code is: [email or web address]  Expires: 2018  9:45 AM     Your access code will  in 90 days. If you need help or a new code, please contact your [facility] or call [phone] for assistance.        Care EveryWhere ID     This is your Care EveryWhere ID. This could be used by other organizations to access your [place] medical records  [id number]         Blood Pressure from  Last 3 Encounters:   08/24/17 116/60   07/10/17 107/75   12/08/16 110/69    Weight from Last 3 Encounters:   08/24/17 73.5 kg (162 lb)   07/10/17 73.3 kg (161 lb 11.2 oz)   12/08/16 75 kg (165 lb 6.4 oz)              Today, you had the following     No orders found for display         Today's Medication Changes          These changes are accurate as of 3/23/18  9:45 AM.  If you have any questions, ask your nurse or doctor.               Start taking these medicines.        Dose/Directions    valACYclovir 500 MG tablet   Commonly known as:  VALTREX   Used for:  Left corneal scar   Started by:  Keren Anaya MD        Dose:  500 mg   Take 1 tablet (500 mg) by mouth daily   Quantity:  90 tablet   Refills:  3            Where to get your medicines      These medications were sent to [facility] - [place], [place] - [address] AT [facility]  [address], [facility] [address]     Phone:  [phone]     valACYclovir 500 MG tablet                Primary Care Provider Office Phone # Fax #    Yasmin Adrianna Crowell [phone] [address] [address]        Equal Access to Services     BERKLEY PAREDES AH: Deon husseino Sobobo, waaxda luqadaha, qaybta kaalmada adeegyada, jade juan. So [facility] [phone].    ATENCIÓN: Si habla español, tiene a santiago disposición servicios gratuitos de asistencia lingüística. Llame al [phone].    We comply with applicable federal civil rights laws and [place] laws. We do not discriminate on the basis of race, color, national origin, age, disability, sex, sexual orientation, or gender identity.            Thank you!     Thank you for choosing EYE CLINIC  for your care. Our goal is always to provide you with excellent care. Hearing back from our patients is one way we can continue to improve our services. Please take a few minutes to complete the written survey that you may receive in the  mail after your visit with us. Thank you!             Your Updated Medication List - Protect others around you: Learn how to safely use, store and throw away your medicines at www.disposemymeds.org.          This list is accurate as of 3/23/18  9:45 AM.  Always use your most recent med list.                   Brand Name Dispense Instructions for use Diagnosis    * FLUoxetine 10 MG capsule    PROzac    15 capsule    Take 10 mg by mouth every other day and 20 mg by mouth on opposite days.    Recurrent major depression in complete remission (H)       * FLUoxetine 20 MG capsule    PROzac    15 capsule    Take 10 mg by mouth every other day and 20 mg by mouth on opposite days.    Recurrent major depression in complete remission (H)       gabapentin 100 MG capsule    NEURONTIN    180 capsule    Take 1-2 capsules by mouth every night at bedtime as needed for restless legs.    Restless leg       imipramine 50 MG tablet    TOFRANIL    90 tablet    Take 1 tablet (50 mg) by mouth At Bedtime    Insomnia       order for [facility]     540 catheter    Equipment being ordered: urinary catheter 6 times daily    Personal history of other disorder of urinary system, Multiple sclerosis (H)       oxybutynin chloride 15 MG Tb24     180 tablet    TAKE 1 TABLET (15 MG) BY MOUTH 2 TIMES DAILY    Urinary incontinence, unspecified type       simvastatin 20 MG tablet    ZOCOR    90 tablet    Take 1 tablet (20 mg) by mouth At Bedtime    Mixed hyperlipidemia       traZODone 50 MG tablet    DESYREL    30 tablet    Take 1 tablet by mouth every other night at bedtime.    Problems related to lack of adequate sleep, Depressive disorder, not elsewhere classified, Backache, unspecified, Esophageal reflux, Multiple sclerosis (H), Migraine, unspecified, with intractable migraine, so stated, without mention of status migrainosus, Hand pain, Preventative health care       valACYclovir 500 MG tablet    VALTREX    90 tablet    Take 1 tablet (500 mg) by mouth daily     Left corneal scar       * Notice:  This list has 2 medication(s) that are the same as other medications prescribed for you. Read the directions carefully, and ask your doctor or other care provider to review them with you.

## 2018-03-23 NOTE — PATIENT INSTRUCTIONS
Instructions:   Artificial tears three-four times daily  Fish oil 1 g per day  Warm compresses TWO times daily  Lid scrubs with baby shampoo     Follow up with Dr. Oneill 1-2 months and Dr. Gutierrez 4 months

## 2018-03-23 NOTE — NURSING NOTE
"Chief Complaints and History of Present Illnesses   Patient presents with     Eye Problem Right Eye     swelling and discharge     HPI    Affected eye(s):  Right   Symptoms:     Puffy eyes   Redness   Tearing   Photophobia   Eye discharge         Do you have eye pain now?:  No      Comments:  Eye problem OD x14 days  [place] in [place] 2w[place] Believes this caused irritation OD (OS asymptomatic)    OD ocular symptoms include: redness of and around eye, puffy, eye discharge Qam (unsure color), increased tears, and light sensitivity. Ocular pain is 2 out of 10. Occurs daily since onset but is ntermittent in occurrence. \"Just uncomfortable like a throbbing ache.\" Tried AT which helped temporarily.     Denies decreased VA or ocular pain right now.     Ocular gtts:  AT Qday-BID OU    Pertinent medical hx:  MS  migraines  daily smoker    Brittany Burnette COT 8:24 AM March 23, 2018                  "

## 2018-03-28 ENCOUNTER — OFFICE VISIT (OUTPATIENT)
Dept: OPTOMETRY | Facility: CLINIC | Age: 63
End: 2018-03-28
Payer: COMMERCIAL

## 2018-03-28 DIAGNOSIS — H17.9 CORNEAL SCAR AND OPACITY: Primary | ICD-10-CM

## 2018-03-28 DIAGNOSIS — H52.212 IRREGULAR ASTIGMATISM OF LEFT EYE: ICD-10-CM

## 2018-03-28 ASSESSMENT — EXTERNAL EXAM - LEFT EYE: OS_EXAM: NORMAL

## 2018-03-28 ASSESSMENT — REFRACTION_WEARINGRX
OD_ADD: +2.50
OS_SPHERE: -9.00
OS_ADD: +2.50
OS_CYLINDER: +2.50
OD_CYLINDER: +0.75
OD_AXIS: 167
OS_AXIS: 180
OD_SPHERE: -8.00

## 2018-03-28 ASSESSMENT — SLIT LAMP EXAM - LIDS: COMMENTS: MILD BLEPHARITIS, MGD

## 2018-03-28 ASSESSMENT — VISUAL ACUITY
OD_CC: 20/40
METHOD: SNELLEN - LINEAR
OS_CC: 20/125
CORRECTION_TYPE: GLASSES

## 2018-03-28 ASSESSMENT — EXTERNAL EXAM - RIGHT EYE: OD_EXAM: NORMAL

## 2018-03-28 ASSESSMENT — REFRACTION_CURRENTRX
OS_DIAMETER: 11.2
OS_SPHERE: -2.00
OS_BRAND: ROSE K2 IC

## 2018-03-28 NOTE — PROGRESS NOTES
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  -BCVA with rigid lens 20/70  -Pt notes great improvement in clarity over habitual spec Rx  -Reviewed with pt - would be best to wear under her current glasses due to level of myopia  -May need left glasses lens updated due to high degree of astigmatism    Order lens, RTC 2 weeks for lens dispense, I&R    Contact Lens Billing  V-Code:  - GP Spherical  Final Contact Lens Rx      Brand Base Curve Diameter Sphere Lens   Right        Left Lorena K2 IC 8.04 11.2 [place] Opt Extra blue, HydraPEG            # of units: 1  Price per Unit: $150    This patient requires contact lenses that are medically necessary for either improvement in vision over spectacles, support of the ocular surface, or other therapeutic benefit. These are not cosmetic contact lenses.     Encounter Diagnoses   Name Primary?     Corneal scar and opacity Yes     Irregular astigmatism of left eye

## 2018-03-28 NOTE — MR AVS SNAPSHOT
After Visit Summary   3/28/2018    Tonya Rodriguez    MRN: [id number]           Patient Information     Date Of Birth          1955        Visit Information        Provider Department      3/28/2018 1:00 PM Arina Oneill OD [facility]         Follow-ups after your visit        Your next 10 appointments already scheduled     2018  1:30 PM CDT   Return Visit with Arina Oneill OD   Eye Clinic ([facility])    [facility]  Clinic [address] [place] [address]   [phone]            2018  1:00 PM CDT   RETURN GENERAL with Kush Gutierrez MD   Eye Clinic ([facility])    [facility] [address]  [address] [address] [phone]              Who to contact     Please call your clinic at [phone] to:    Ask questions about your health    Make or cancel appointments    Discuss your medicines    Learn about your test results    Speak to your doctor            Additional Information About Your Visit        [employer] Information     RxAnte is an electronic gateway that provides easy, online access to your medical records. With RxAnte, you can request a clinic appointment, read your test results, renew a prescription or communicate with your care team.     To sign up for RxAnte visit the website at [email or web address]   You will be asked to enter the access code listed below, as well as some personal information. Please follow the directions to create your username and password.     Your access code is: [email or web address]  Expires: 2018  9:45 AM     Your access code will  in 90 days. If you need help or a new code, please contact your [facility] or call [phone] for assistance.        Care EveryWhere ID     This is your Care EveryWhere ID. This could be used by other organizations to access your [facility]  records  [id number]         Blood Pressure from Last 3 Encounters:   08/24/17 116/60   07/10/17 107/75   12/08/16 110/69    Weight from Last 3 Encounters:   08/24/17 73.5 kg (162 lb)   07/10/17 73.3 kg (161 lb 11.2 oz)   12/08/16 75 kg (165 lb 6.4 oz)              Today, you had the following     No orders found for display       Primary Care Provider Office Phone # Fax #    Yasmin Adrianna Crowell [phone] [address] [facility] [address]        Equal Access to Services     [facility]: Hadii senait Serrano, wajonathan perez, qajayson harrismamike chaudhry, jade robles . So [facility] [phone].    ATENCIÓN: Si habla español, tiene a santiago disposición servicios gratuitos de asistencia lingüística. [facility] [phone].    We comply with applicable federal civil rights laws and [place] laws. We do not discriminate on the basis of race, color, national origin, age, disability, sex, sexual orientation, or gender identity.            Thank you!     Thank you for choosing EYE CLINIC  for your care. Our goal is always to provide you with excellent care. Hearing back from our patients is one way we can continue to improve our services. Please take a few minutes to complete the written survey that you may receive in the mail after your visit with us. Thank you!             Your Updated Medication List - Protect others around you: Learn how to safely use, store and throw away your medicines at www.disposemymeds.org.          This list is accurate as of 3/28/18  1:24 PM.  Always use your most recent med list.                   Brand Name Dispense Instructions for use Diagnosis    * FLUoxetine 10 MG capsule    PROzac    15 capsule    Take 10 mg by mouth every other day and 20 mg by mouth on opposite days.    Recurrent major depression in complete remission (H)       * FLUoxetine 20 MG capsule    PROzac    15 capsule    Take 10 mg by mouth every other day and 20 mg by  mouth on opposite days.    Recurrent major depression in complete remission (H)       gabapentin 100 MG capsule    NEURONTIN    180 capsule    Take 1-2 capsules by mouth every night at bedtime as needed for restless legs.    Restless leg       imipramine 50 MG tablet    TOFRANIL    90 tablet    Take 1 tablet (50 mg) by mouth At Bedtime    Insomnia       order for DME     540 catheter    Equipment being ordered: urinary catheter 6 times daily    Personal history of other disorder of urinary system, Multiple sclerosis (H)       oxybutynin chloride 15 MG Tb24     180 tablet    TAKE 1 TABLET (15 MG) BY MOUTH 2 TIMES DAILY    Urinary incontinence, unspecified type       simvastatin 20 MG tablet    ZOCOR    90 tablet    Take 1 tablet (20 mg) by mouth At Bedtime    Mixed hyperlipidemia       traZODone 50 MG tablet    DESYREL    30 tablet    Take 1 tablet by mouth every other night at bedtime.    Problems related to lack of adequate sleep, Depressive disorder, not elsewhere classified, Backache, unspecified, Esophageal reflux, Multiple sclerosis (H), Migraine, unspecified, with intractable migraine, so stated, without mention of status migrainosus, Hand pain, Preventative health care       valACYclovir 500 MG tablet    VALTREX    90 tablet    Take 1 tablet (500 mg) by mouth daily    Left corneal scar       * Notice:  This list has 2 medication(s) that are the same as other medications prescribed for you. Read the directions carefully, and ask your doctor or other care provider to review them with you.

## 2018-04-11 ENCOUNTER — OFFICE VISIT (OUTPATIENT)
Dept: OPTOMETRY | Facility: CLINIC | Age: 63
End: 2018-04-11
Payer: COMMERCIAL

## 2018-04-11 DIAGNOSIS — H52.212 IRREGULAR ASTIGMATISM OF LEFT EYE: ICD-10-CM

## 2018-04-11 DIAGNOSIS — H17.9 CORNEAL SCAR AND OPACITY: Primary | ICD-10-CM

## 2018-04-11 ASSESSMENT — REFRACTION_WEARINGRX
OS_CYLINDER: +2.50
OS_ADD: +2.50
OD_ADD: +2.50
OD_AXIS: 167
OD_CYLINDER: +0.75
OD_SPHERE: -8.00
OS_SPHERE: -9.00
OS_AXIS: 180

## 2018-04-11 ASSESSMENT — VISUAL ACUITY
OS_CC: 20/70
METHOD: SNELLEN - LINEAR
OD_CC: 20/30-2
CORRECTION_TYPE: GLASSES, CONTACTS

## 2018-04-11 ASSESSMENT — EXTERNAL EXAM - RIGHT EYE: OD_EXAM: NORMAL

## 2018-04-11 ASSESSMENT — REFRACTION_CURRENTRX
OS_DIAMETER: 11.2
OS_BRAND: ROSE K2 IC
OS_SPHERE: PLANO
OS_BASECURVE: 8.04

## 2018-04-11 ASSESSMENT — EXTERNAL EXAM - LEFT EYE: OS_EXAM: NORMAL

## 2018-04-11 ASSESSMENT — SLIT LAMP EXAM - LIDS: COMMENTS: MILD BLEPHARITIS, MGD

## 2018-04-11 NOTE — MR AVS SNAPSHOT
After Visit Summary   2018    Tonya Rodriguez    MRN: [id number]           Patient Information     Date Of Birth          1955        Visit Information        Provider Department      2018 1:30 PM Arina Oneill, [facility]        Today's Diagnoses     Corneal scar and opacity    -  1    Irregular astigmatism of left eye           Follow-ups after your visit        Your next 10 appointments already scheduled     2018  1:00 PM CDT   RETURN GENERAL with Kush Gutierrez MD   Eye Clinic ([facility])    [address]  [facility] [address] [place] [address]   [phone]              Who to contact     Please call your clinic at [phone] to:    Ask questions about your health    Make or cancel appointments    Discuss your medicines    Learn about your test results    Speak to your doctor            Additional Information About Your Visit        MyChart Information     Giftbart is an electronic gateway that provides easy, online access to your medical records. With vitaMedMD, you can request a clinic appointment, read your test results, renew a prescription or communicate with your care team.     To sign up for Giftbart visit the website at [email or web address]   You will be asked to enter the access code listed below, as well as some personal information. Please follow the directions to create your username and password.     Your access code is: [email or web address]  Expires: 2018  9:45 AM     Your access code will  in 90 days. If you need help or a new code, please contact your [facility] or call [phone] for assistance.        Care EveryWhere ID     This is your Care EveryWhere ID. This could be used by other organizations to access your [place] medical records  [id number]         Blood Pressure from Last 3 Encounters:   17 116/60   07/10/17 107/75   16 110/69     Weight from Last 3 Encounters:   08/24/17 73.5 kg (162 lb)   07/10/17 73.3 kg (161 lb 11.2 oz)   12/08/16 75 kg (165 lb 6.4 oz)              Today, you had the following     No orders found for display       Primary Care Provider Office Phone # Fax #    Yasmin Adrianna Crowell [phone] [address] [facility] [address]        Equal Access to Services     BERKLEY PAREDES : Hadii aad ku hadasho Soomaali, waaxda luqadaha, qaybta kaalmada adeegyada, waxay idiin hayaan adeeg kharash la'ryan . So [facility] [phone].    ATENCIÓN: Si habla español, tiene a santiago disposición servicios gratuitos de asistencia lingüística. [facility] [phone].    We comply with applicable federal civil rights laws and [place] laws. We do not discriminate on the basis of race, color, national origin, age, disability, sex, sexual orientation, or gender identity.            Thank you!     Thank you for choosing EYE CLINIC  for your care. Our goal is always to provide you with excellent care. Hearing back from our patients is one way we can continue to improve our services. Please take a few minutes to complete the written survey that you may receive in the mail after your visit with us. Thank you!             Your Updated Medication List - Protect others around you: Learn how to safely use, store and throw away your medicines at www.disposemymeds.org.          This list is accurate as of 4/11/18  1:34 PM.  Always use your most recent med list.                   Brand Name Dispense Instructions for use Diagnosis    * FLUoxetine 10 MG capsule    PROzac    15 capsule    Take 10 mg by mouth every other day and 20 mg by mouth on opposite days.    Recurrent major depression in complete remission (H)       * FLUoxetine 20 MG capsule    PROzac    15 capsule    Take 10 mg by mouth every other day and 20 mg by mouth on opposite days.    Recurrent major depression in complete remission (H)       gabapentin 100 MG capsule     NEURONTIN    180 capsule    Take 1-2 capsules by mouth every night at bedtime as needed for restless legs.    Restless leg       imipramine 50 MG tablet    TOFRANIL    90 tablet    Take 1 tablet (50 mg) by mouth At Bedtime    Insomnia       order for [facility]     540 catheter    Equipment being ordered: urinary catheter 6 times daily    Personal history of other disorder of urinary system, Multiple sclerosis (H)       oxybutynin chloride 15 MG Tb24     180 tablet    TAKE 1 TABLET (15 MG) BY MOUTH 2 TIMES DAILY    Urinary incontinence, unspecified type       simvastatin 20 MG tablet    ZOCOR    90 tablet    Take 1 tablet (20 mg) by mouth At Bedtime    Mixed hyperlipidemia       traZODone 50 MG tablet    DESYREL    30 tablet    Take 1 tablet by mouth every other night at bedtime.    Problems related to lack of adequate sleep, Depressive disorder, not elsewhere classified, Backache, unspecified, Esophageal reflux, Multiple sclerosis (H), Migraine, unspecified, with intractable migraine, so stated, without mention of status migrainosus, Hand pain, Preventative health care       valACYclovir 500 MG tablet    VALTREX    90 tablet    Take 1 tablet (500 mg) by mouth daily    Left corneal scar       * Notice:  This list has 2 medication(s) that are the same as other medications prescribed for you. Read the directions carefully, and ask your doctor or other care provider to review them with you.

## 2018-04-11 NOTE — PROGRESS NOTES
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

## 2018-07-23 DIAGNOSIS — H40.009 GLAUCOMA SUSPECT: Primary | ICD-10-CM

## 2018-07-24 ENCOUNTER — OFFICE VISIT (OUTPATIENT)
Dept: OPHTHALMOLOGY | Facility: CLINIC | Age: 63
End: 2018-07-24
Attending: OPHTHALMOLOGY
Payer: MEDICARE

## 2018-07-24 DIAGNOSIS — H40.003 GLAUCOMA SUSPECT OF BOTH EYES: ICD-10-CM

## 2018-07-24 DIAGNOSIS — H04.129 DRY EYE: Primary | ICD-10-CM

## 2018-07-24 DIAGNOSIS — H17.9 LEFT CORNEAL SCAR: ICD-10-CM

## 2018-07-24 DIAGNOSIS — H10.013 ACUTE FOLLICULAR CONJUNCTIVITIS OF BOTH EYES: ICD-10-CM

## 2018-07-24 PROCEDURE — 92133 CPTRZD OPH DX IMG PST SGM ON: CPT | Mod: ZF | Performed by: OPHTHALMOLOGY

## 2018-07-24 PROCEDURE — G0463 HOSPITAL OUTPT CLINIC VISIT: HCPCS | Mod: ZF

## 2018-07-24 RX ORDER — CYCLOSPORINE 0.5 MG/ML
1 EMULSION OPHTHALMIC 2 TIMES DAILY
Qty: 3 EACH | Refills: 3 | Status: SHIPPED | OUTPATIENT
Start: 2018-07-24 | End: 2018-07-31

## 2018-07-24 RX ORDER — FLUOROMETHOLONE 0.1 %
1 SUSPENSION, DROPS(FINAL DOSAGE FORM)(ML) OPHTHALMIC (EYE) 2 TIMES DAILY
Qty: 1 BOTTLE | Refills: 1 | Status: SHIPPED | OUTPATIENT
Start: 2018-07-24 | End: 2018-09-04

## 2018-07-24 ASSESSMENT — VISUAL ACUITY
METHOD_MR: DECLINES MR TODAY
METHOD: SNELLEN - LINEAR
CORRECTION_TYPE: GLASSES
OD_CC: 20/30
OS_CC: 20/150
OS_PH_CC: 20/100

## 2018-07-24 ASSESSMENT — EXTERNAL EXAM - RIGHT EYE: OD_EXAM: NORMAL

## 2018-07-24 ASSESSMENT — TONOMETRY
IOP_METHOD: TONOPEN
OD_IOP_MMHG: 20
OS_IOP_MMHG: 19

## 2018-07-24 ASSESSMENT — REFRACTION_WEARINGRX
OS_CYLINDER: +2.50
OD_SPHERE: -8.00
OD_ADD: +2.50
OD_CYLINDER: +0.75
OS_ADD: +2.50
OS_AXIS: 180
OS_SPHERE: -9.00
OD_AXIS: 167

## 2018-07-24 ASSESSMENT — CONF VISUAL FIELD
OS_NORMAL: 1
METHOD: COUNTING FINGERS
OD_NORMAL: 1

## 2018-07-24 ASSESSMENT — CUP TO DISC RATIO
OS_RATIO: 0.6
OD_RATIO: 0.3

## 2018-07-24 ASSESSMENT — EXTERNAL EXAM - LEFT EYE: OS_EXAM: NORMAL

## 2018-07-24 NOTE — PROGRESS NOTES
HPI:   Patient reports pain and swelling around each eye that started again over a month ago. Had had similar symptoms about 4 months prior and they resolved w/ treatment - warm compresses, ATs, valtrex. Still uses ATs, valtrex. Last week, right eye swelled to point of touching pt's glasses. Has had pain w/ contact lens use - painful for past month, has not used contacts for about that long. Endorses redness when this episode started. Increased tearing throughout.     Had episode last night of feeling she couldn't see out of half of eye. Had flashes/floaters - 2-3 floaters. Episode resolved when pt awoke.     POHx:  HSV keratitis  Optic neuritis    Current Eye Medications:   None       Assessment & Plan   Tonya Rodriguez is a 61 year old female with the following diagnoses:   1. Blepharitis  2. H/O Herpes keratitis, left eye:  3. Cornea scar, left eye   4. Dry eye, both eyes     Unable to tolerate contact lens for a few weeks.  Foreign body sensation in both eyes.  Not using drops  Recommend restasis twice a day both eyes (not covered - will try xiidra)  FML twice a day both eyes for 1 mo, then stop  Artificial tears twice a day and as needed   Continue valtrex 500 mg orally daily     5. Nuclear senile sclerosis, bilateral:  Visually non-significant  -Continue to monitor    6. Asymmetric C:D ratio:    History of optic neuritis left eye in the past  No pallor seen, but view limited with K scar  OCT stable today to baseline  Intraocular pressure remains within normal limits    Assessment & Plan        Patient disposition:   Return in about 2 months (around 9/24/2018) for VT only, Tear lab.            Diego Schaeffer M.D.  PGY-2  Ophthalmology    Attending Physician Attestation:  Complete documentation of historical and exam elements from today's encounter can be found in the full encounter summary report (not reduplicated in this progress note).  I personally obtained the chief complaint(s) and history of  present illness.  I confirmed and edited as necessary the review of systems, past medical/surgical history, family history, social history, and examination findings as documented by others; and I examined the patient myself.  I personally reviewed the relevant tests, images, and reports as documented above.  I formulated and edited as necessary the assessment and plan and discussed the findings and management plan with the patient and family. Attending Physician Image/Tesing Attestation: I personally reviewed the ophthalmic test(s) associated with this encounter, agree with the interpretation(s) as documented by the resident/fellow, and have edited the corresponding report(s) as necessary.   . - Kush Gutierrez MD

## 2018-07-24 NOTE — NURSING NOTE
Chief Complaints and History of Present Illnesses   Patient presents with     Yearly Exam     Herpes keratitis     HPI    Affected eye(s):  Both   Symptoms:        Frequency:  Constant       Do you have eye pain now?:  No      Comments:  States that the lids are swelling,  + crusty in the am  +discomfort  States va is the same since last visit  Has been using cold compresses to help with the swelling  Susi Cedeno COT 1:44 PM July 24, 2018

## 2018-07-24 NOTE — MR AVS SNAPSHOT
After Visit Summary   7/24/2018    Tonya Rodriguez    MRN: [id number]           Patient Information     Date Of Birth          1955        Visit Information        Provider Department      7/24/2018 1:00 PM Kush Gutierrez MD [facility]        Today's Diagnoses     Dry eye    -  1    Acute follicular conjunctivitis of both eyes           Follow-ups after your visit        Your next 10 appointments already scheduled     Sep 04, 2018  2:15 PM CDT   RETURN GENERAL with Kush Gutierrez MD   Eye Clinic ([facility])    [address] [facility] [address] [place] [id number]   [phone]              Who to contact     Please call your clinic at [phone] to:    Ask questions about your health    Make or cancel appointments    Discuss your medicines    Learn about your test results    Speak to your doctor            Additional Information About Your Visit        Care EveryWhere ID     This is your Care EveryWhere ID. This could be used by other organizations to access your [facility] medical records  [id number]         Blood Pressure from Last 3 Encounters:   08/24/17 116/60   07/10/17 107/75   12/08/16 110/69    Weight from Last 3 Encounters:   08/24/17 73.5 kg (162 lb)   07/10/17 73.3 kg (161 lb 11.2 oz)   12/08/16 75 kg (165 lb 6.4 oz)              We Performed the Following     OCT Optic Nerve RNFL Spectralis OU (both eyes)          Today's Medication Changes          These changes are accurate as of 7/24/18  3:37 PM.  If you have any questions, ask your nurse or doctor.               Start taking these medicines.        Dose/Directions    cycloSPORINE 0.05 % ophthalmic emulsion   Commonly known as:  RESTASIS   Used for:  Dry eye   Started by:  Kush Gutierrez MD        Dose:  1 drop   Place 1 drop into both eyes 2 times daily   Quantity:  3 each   Refills:  3       fluorometholone 0.1 % ophthalmic susp   Commonly known as:  FML  LIQUIFILM   Used for:  Dry eye, Acute follicular conjunctivitis of both eyes   Started by:  Kush Gutierrez MD        Dose:  1 drop   Place 1 drop into both eyes 2 times daily   Quantity:  1 Bottle   Refills:  1            Where to get your medicines      These medications were sent to [id number] - [place], [place] - [address]  [address] [facility] [address]     Phone:  [phone]     cycloSPORINE 0.05 % ophthalmic emulsion    fluorometholone 0.1 % ophthalmic susp                Primary Care Provider Office Phone # Fax #    Yasmin Adrianna Crowell [phone] [address] [facility] [address]        Equal Access to Services     BERKLEY PAREDES : Deon umanzor Sobobo, waaxda chris, qaybta kaalmada richa, jade juan. So [facility] [phone].    ATENCIÓN: Si habla español, tiene a santiago disposición servicios gratuitos de asistencia lingüística. Llame al [phone].    We comply with applicable federal civil rights laws and [place] laws. We do not discriminate on the basis of race, color, national origin, age, disability, sex, sexual orientation, or gender identity.            Thank you!     Thank you for choosing EYE CLINIC  for your care. Our goal is always to provide you with excellent care. Hearing back from our patients is one way we can continue to improve our services. Please take a few minutes to complete the written survey that you may receive in the mail after your visit with us. Thank you!             Your Updated Medication List - Protect others around you: Learn how to safely use, store and throw away your medicines at www.disposemymeds.org.          This list is accurate as of 7/24/18  3:37 PM.  Always use your most recent med list.                   Brand Name Dispense Instructions for use Diagnosis    cycloSPORINE 0.05 % ophthalmic emulsion    RESTASIS    3 each    Place 1 drop into both eyes 2  times daily    Dry eye       fluorometholone 0.1 % ophthalmic susp    FML LIQUIFILM    1 Bottle    Place 1 drop into both eyes 2 times daily    Dry eye, Acute follicular conjunctivitis of both eyes       * FLUoxetine 10 MG capsule    PROzac    15 capsule    Take 10 mg by mouth every other day and 20 mg by mouth on opposite days.    Recurrent major depression in complete remission (H)       * FLUoxetine 20 MG capsule    PROzac    15 capsule    Take 10 mg by mouth every other day and 20 mg by mouth on opposite days.    Recurrent major depression in complete remission (H)       gabapentin 100 MG capsule    NEURONTIN    180 capsule    Take 1-2 capsules by mouth every night at bedtime as needed for restless legs.    Restless leg       imipramine 50 MG tablet    TOFRANIL    90 tablet    Take 1 tablet (50 mg) by mouth At Bedtime    Insomnia       order for [facility]     540 catheter    Equipment being ordered: urinary catheter 6 times daily    Personal history of other disorder of urinary system, Multiple sclerosis (H)       oxybutynin chloride 15 MG Tb24     180 tablet    TAKE 1 TABLET (15 MG) BY MOUTH 2 TIMES DAILY    Urinary incontinence, unspecified type       simvastatin 20 MG tablet    ZOCOR    90 tablet    Take 1 tablet (20 mg) by mouth At Bedtime    Mixed hyperlipidemia       traZODone 50 MG tablet    DESYREL    30 tablet    Take 1 tablet by mouth every other night at bedtime.    Problems related to lack of adequate sleep, Depressive disorder, not elsewhere classified, Backache, unspecified, Esophageal reflux, Multiple sclerosis (H), Migraine, unspecified, with intractable migraine, so stated, without mention of status migrainosus, Hand pain, Preventative health care       valACYclovir 500 MG tablet    VALTREX    90 tablet    Take 1 tablet (500 mg) by mouth daily    Left corneal scar       * Notice:  This list has 2 medication(s) that are the same as other medications prescribed for you. Read the directions carefully,  and ask your doctor or other care provider to review them with you.

## 2018-07-25 ASSESSMENT — SLIT LAMP EXAM - LIDS
COMMENTS: MILD BLEPHARITIS, MGD
COMMENTS: MILD BLEPHARITIS, MGD

## 2018-09-04 ENCOUNTER — OFFICE VISIT (OUTPATIENT)
Dept: OPHTHALMOLOGY | Facility: CLINIC | Age: 63
End: 2018-09-04
Attending: OPHTHALMOLOGY
Payer: MEDICARE

## 2018-09-04 DIAGNOSIS — H01.01B ULCERATIVE BLEPHARITIS OF UPPER AND LOWER EYELIDS OF BOTH EYES: ICD-10-CM

## 2018-09-04 DIAGNOSIS — H01.01A ULCERATIVE BLEPHARITIS OF UPPER AND LOWER EYELIDS OF BOTH EYES: ICD-10-CM

## 2018-09-04 DIAGNOSIS — H04.129 DRY EYE: Primary | ICD-10-CM

## 2018-09-04 DIAGNOSIS — B00.52 HERPES SIMPLEX KERATITIS OF LEFT EYE: ICD-10-CM

## 2018-09-04 PROCEDURE — G0463 HOSPITAL OUTPT CLINIC VISIT: HCPCS | Mod: ZF

## 2018-09-04 RX ORDER — CYCLOSPORINE 0.5 MG/ML
1 EMULSION OPHTHALMIC 2 TIMES DAILY
Qty: 3 EACH | Refills: 3 | Status: SHIPPED | OUTPATIENT
Start: 2018-09-04 | End: 2019-01-16

## 2018-09-04 RX ORDER — NEOMYCIN SULFATE, POLYMYXIN B SULFATE, AND DEXAMETHASONE 3.5; 10000; 1 MG/G; [USP'U]/G; MG/G
0.5 OINTMENT OPHTHALMIC AT BEDTIME
Qty: 2 TUBE | Refills: 3 | Status: SHIPPED | OUTPATIENT
Start: 2018-09-04 | End: 2019-02-13

## 2018-09-04 ASSESSMENT — TONOMETRY
OS_IOP_MMHG: 18
OD_IOP_MMHG: 19
IOP_METHOD: ICARE

## 2018-09-04 ASSESSMENT — EXTERNAL EXAM - LEFT EYE: OS_EXAM: NORMAL

## 2018-09-04 ASSESSMENT — REFRACTION_WEARINGRX
OS_AXIS: 180
OD_CYLINDER: +0.75
OS_CYLINDER: +2.50
OD_SPHERE: -8.00
OS_SPHERE: -9.00
OD_AXIS: 167
OD_ADD: +2.50
OS_ADD: +2.50

## 2018-09-04 ASSESSMENT — VISUAL ACUITY
METHOD: SNELLEN - LINEAR
CORRECTION_TYPE: GLASSES
OD_CC: 20/40
OS_PH_CC: 20/125
OS_CC: 20/200

## 2018-09-04 ASSESSMENT — CONF VISUAL FIELD
OS_NORMAL: 1
OD_NORMAL: 1
METHOD: COUNTING FINGERS

## 2018-09-04 ASSESSMENT — SLIT LAMP EXAM - LIDS
COMMENTS: MILD BLEPHARITIS, MGD
COMMENTS: MILD BLEPHARITIS, MGD

## 2018-09-04 ASSESSMENT — EXTERNAL EXAM - RIGHT EYE: OD_EXAM: NORMAL

## 2018-09-04 NOTE — PROGRESS NOTES
HPI:   Here for recheck of eye irritation.  Minimal change from last visit.  Restasis $95 per bottle, only lasting 1-2 weeks    POHx:  HSV keratitis  Optic neuritis    Current Eye Medications:   Artificial tears as needed   Cool compresses      Assessment & Plan   Tonya Rodriguez is a 63 year old female with the following diagnoses:     1. Blepharitis  2. H/O Herpes keratitis, left eye:  3. Cornea scar, left eye   4. Dry eye, both eyes     Has been using cold compresses and only on restasis for a short while before running out  High cost due to insurance coverage    Resume restasis twice a day both eyes, given company info for discount if applicable.  Instructed on proper instillation  Recommend to switch to hot compresses twice a day   Add maxitrol farhad at bedtime to all eyelids  Artificial tears twice a day and as needed   Continue valtrex 500 mg orally daily     5. Nuclear senile sclerosis, bilateral:  Visually non-significant  -Continue to monitor    6. Asymmetric C:D ratio:    History of optic neuritis left eye in the past  No pallor seen, but view limited with K scar  OCT stable today to baseline  Intraocular pressure remains within normal limits    Patient disposition:   Return in about 2 months (around 11/4/2018) for VT only, Tear lab.          Attending Physician Attestation:  Complete documentation of historical and exam elements from today's encounter can be found in the full encounter summary report (not reduplicated in this progress note).  I personally obtained the chief complaint(s) and history of present illness.  I confirmed and edited as necessary the review of systems, past medical/surgical history, family history, social history, and examination findings as documented by others; and I examined the patient myself.  I personally reviewed the relevant tests, images, and reports as documented above.  I formulated and edited as necessary the assessment and plan and discussed the findings and  management plan with the patient and family. . - Kush Gutierrez MD

## 2018-09-04 NOTE — NURSING NOTE
Chief Complaints and History of Present Illnesses   Patient presents with     Follow Up For     Blepharitis     HPI    Affected eye(s):  Both   Symptoms:        Frequency:  Constant       Do you have eye pain now?:  No      Comments:  States that the va is not doing good today  The lids are swollen today and wondering if there is anything she can do other than the cold compress  +water  Susi Cedeno COT 3:09 PM September 4, 2018

## 2018-09-04 NOTE — MR AVS SNAPSHOT
After Visit Summary   9/4/2018    Tonya Rodriguez    MRN: [id number]           Patient Information     Date Of Birth          1955        Visit Information        Provider Department      9/4/2018 2:15 PM Kush Gutierrez MD [facility]        Today's Diagnoses     Dry eye    -  1    Herpes simplex keratitis of left eye        Ulcerative blepharitis of upper and lower eyelids of both eyes           Follow-ups after your visit        Follow-up notes from your care team     Return in about 2 months (around 11/4/2018) for VT only, Tear lab.      Who to contact     Please call your clinic at [phone] to:    Ask questions about your health    Make or cancel appointments    Discuss your medicines    Learn about your test results    Speak to your doctor            Additional Information About Your Visit        Care EveryWhere ID     This is your Care EveryWhere ID. This could be used by other organizations to access your [facility] medical records  [id number]         Blood Pressure from Last 3 Encounters:   08/24/17 116/60   07/10/17 107/75   12/08/16 110/69    Weight from Last 3 Encounters:   08/24/17 73.5 kg (162 lb)   07/10/17 73.3 kg (161 lb 11.2 oz)   12/08/16 75 kg (165 lb 6.4 oz)              Today, you had the following     No orders found for display         Today's Medication Changes          These changes are accurate as of 9/4/18  4:52 PM.  If you have any questions, ask your nurse or doctor.               Start taking these medicines.        Dose/Directions    cycloSPORINE 0.05 % ophthalmic emulsion   Commonly known as:  RESTASIS MULTIDOSE   Used for:  Dry eye, Herpes simplex keratitis of left eye        Dose:  1 drop   Place 1 drop into both eyes 2 times daily   Quantity:  3 each   Refills:  3       neomycin-polymyxin-dexamethasone 3.5-56256-9.1 Oint ophthalmic ointment   Commonly known as:  MAXITROL   Used for:  Ulcerative blepharitis of upper and lower eyelids of both eyes         Dose:  0.5 inch   Place 0.5 inches into both eyes At Bedtime   Quantity:  2 Tube   Refills:  3         Stop taking these medicines if you haven't already. Please contact your care team if you have questions.     fluorometholone 0.1 % ophthalmic susp   Commonly known as:  FML LIQUIFILM           lifitegrast 5 % opthalmic solution   Commonly known as:  XIIDRA                Where to get your medicines      These medications were sent to [id number] - [place], [place] - [address]  [address] [facility] [address]     Phone:  [phone]     cycloSPORINE 0.05 % ophthalmic emulsion    neomycin-polymyxin-dexamethasone 3.5-83504-4.1 Oint ophthalmic ointment                Primary Care Provider Office Phone # Fax #    Yasmin Crowell [phone] [address] [facility] [address]        Equal Access to Services     BERK[facility] : Hadii senait benson hadasho Soomaali, waaxda luqadaha, qaybta kaalmada adeegyada, jade robles . So [facility] [phone].    ATENCIÓN: Si habla español, tiene a santiago disposición servicios gratuitos de asistencia lingüística. Llame al [phone].    We comply with applicable federal civil rights laws and [place] laws. We do not discriminate on the basis of race, color, national origin, age, disability, sex, sexual orientation, or gender identity.            Thank you!     Thank you for choosing [facility]  for your care. Our goal is always to provide you with excellent care. Hearing back from our patients is one way we can continue to improve our services. Please take a few minutes to complete the written survey that you may receive in the mail after your visit with us. Thank you!             Your Updated Medication List - Protect others around you: Learn how to safely use, store and throw away your medicines at www.disposemymeds.org.          This list is accurate as of 9/4/18  4:52 PM.  Always use your most  recent med list.                   Brand Name Dispense Instructions for use Diagnosis    cycloSPORINE 0.05 % ophthalmic emulsion    RESTASIS MULTIDOSE    3 each    Place 1 drop into both eyes 2 times daily    Dry eye, Herpes simplex keratitis of left eye       * FLUoxetine 10 MG capsule    PROzac    15 capsule    Take 10 mg by mouth every other day and 20 mg by mouth on opposite days.    Recurrent major depression in complete remission (H)       * FLUoxetine 20 MG capsule    PROzac    15 capsule    Take 10 mg by mouth every other day and 20 mg by mouth on opposite days.    Recurrent major depression in complete remission (H)       gabapentin 100 MG capsule    NEURONTIN    180 capsule    Take 1-2 capsules by mouth every night at bedtime as needed for restless legs.    Restless leg       imipramine 50 MG tablet    TOFRANIL    90 tablet    Take 1 tablet (50 mg) by mouth At Bedtime    Insomnia       neomycin-polymyxin-dexamethasone 3.5-10600-6.1 Oint ophthalmic ointment    MAXITROL    2 Tube    Place 0.5 inches into both eyes At Bedtime    Ulcerative blepharitis of upper and lower eyelids of both eyes       order for [facility]     540 catheter    Equipment being ordered: urinary catheter 6 times daily    Personal history of other disorder of urinary system, Multiple sclerosis (H)       oxybutynin chloride 15 MG Tb24     180 tablet    TAKE 1 TABLET (15 MG) BY MOUTH 2 TIMES DAILY    Urinary incontinence, unspecified type       simvastatin 20 MG tablet    ZOCOR    90 tablet    Take 1 tablet (20 mg) by mouth At Bedtime    Mixed hyperlipidemia       traZODone 50 MG tablet    DESYREL    30 tablet    Take 1 tablet by mouth every other night at bedtime.    Problems related to lack of adequate sleep, Depressive disorder, not elsewhere classified, Backache, unspecified, Esophageal reflux, Multiple sclerosis (H), Migraine, unspecified, with intractable migraine, so stated, without mention of status migrainosus, Hand pain, Preventative  health care       valACYclovir 500 MG tablet    VALTREX    90 tablet    Take 1 tablet (500 mg) by mouth daily    Left corneal scar       * Notice:  This list has 2 medication(s) that are the same as other medications prescribed for you. Read the directions carefully, and ask your doctor or other care provider to review them with you.

## 2018-09-16 DIAGNOSIS — R32 URINARY INCONTINENCE, UNSPECIFIED TYPE: ICD-10-CM

## 2018-09-17 RX ORDER — OXYBUTYNIN CHLORIDE 15 MG/1
TABLET, EXTENDED RELEASE ORAL
Qty: 60 TABLET | Refills: 0 | Status: SHIPPED | OUTPATIENT
Start: 2018-09-17 | End: 2018-12-13

## 2018-09-17 NOTE — TELEPHONE ENCOUNTER
"Requested Prescriptions   Pending Prescriptions Disp Refills     oxybutynin chloride 15 MG TB24 [Pharmacy Med Name: Oxybutynin Chloride ER Oral Tablet Extended Release 24 Hour 15 MG]  Last Written Prescription Date:  01/31/18  Last Fill Quantity: 180,  # refills: 1   Last office visit: 8/24/2017 with prescribing provider:  08/24/17   Future Office Visit:     60 tablet 0     Sig: TAKE 1 TABLET (15 MG) BY MOUTH 2 TIMES DAILY    Muscarinic Antagonists (Urinary Incontinence Agents) Failed    9/16/2018  7:03 AM       Failed - Recent (12 mo) or future (30 days) visit within the authorizing provider's specialty    Patient had office visit in the last 12 months or has a visit in the next 30 days with authorizing provider or within the authorizing provider's specialty.  See \"Patient Info\" tab in inbasket, or \"Choose Columns\" in Meds & Orders section of the refill encounter.           Passed - Medication is Oxybutynin and patient is 5 years of age or older       Passed - Patient does not have a diagnosis of glaucoma on the problem list    If glaucoma diagnosis is new, refer refill to physician       Passed - Patient is 18 years of age or older          "

## 2018-09-17 NOTE — TELEPHONE ENCOUNTER
"Medication is being filled for 1 time refill only due to:  Patient needs to be seen because it has been more than one year since last visit.   Called her to advise an appt. As it has been more than a year. \"I will call back when I am near a pencil and my calendar and make an appt, thanks for calling. \"    Shantell Julien RNC      "

## 2018-10-05 ENCOUNTER — OFFICE VISIT (OUTPATIENT)
Dept: FAMILY MEDICINE | Facility: CLINIC | Age: 63
End: 2018-10-05
Payer: COMMERCIAL

## 2018-10-05 VITALS
TEMPERATURE: 97.4 F | OXYGEN SATURATION: 99 % | SYSTOLIC BLOOD PRESSURE: 98 MMHG | HEART RATE: 89 BPM | WEIGHT: 131.9 LBS | BODY MASS INDEX: 19.54 KG/M2 | HEIGHT: 69 IN | DIASTOLIC BLOOD PRESSURE: 70 MMHG

## 2018-10-05 DIAGNOSIS — R25.1 SHAKES: ICD-10-CM

## 2018-10-05 DIAGNOSIS — R00.0 TACHYCARDIA: ICD-10-CM

## 2018-10-05 DIAGNOSIS — Z12.11 SPECIAL SCREENING FOR MALIGNANT NEOPLASMS, COLON: ICD-10-CM

## 2018-10-05 DIAGNOSIS — J20.9 ACUTE BRONCHITIS, UNSPECIFIED ORGANISM: ICD-10-CM

## 2018-10-05 DIAGNOSIS — E05.90 HYPERTHYROIDISM: Primary | ICD-10-CM

## 2018-10-05 LAB
ALBUMIN SERPL-MCNC: 3.6 G/DL (ref 3.4–5)
ALP SERPL-CCNC: 116 U/L (ref 40–150)
ALT SERPL W P-5'-P-CCNC: 22 U/L (ref 0–50)
ANION GAP SERPL CALCULATED.3IONS-SCNC: 5 MMOL/L (ref 3–14)
AST SERPL W P-5'-P-CCNC: 15 U/L (ref 0–45)
BASOPHILS # BLD AUTO: 0 10E9/L (ref 0–0.2)
BASOPHILS NFR BLD AUTO: 0.2 %
BILIRUB SERPL-MCNC: 0.8 MG/DL (ref 0.2–1.3)
BUN SERPL-MCNC: 15 MG/DL (ref 7–30)
CALCIUM SERPL-MCNC: 10 MG/DL (ref 8.5–10.1)
CHLORIDE SERPL-SCNC: 106 MMOL/L (ref 94–109)
CO2 SERPL-SCNC: 30 MMOL/L (ref 20–32)
CREAT SERPL-MCNC: 0.65 MG/DL (ref 0.52–1.04)
DIFFERENTIAL METHOD BLD: NORMAL
EOSINOPHIL # BLD AUTO: 0.1 10E9/L (ref 0–0.7)
EOSINOPHIL NFR BLD AUTO: 1.4 %
ERYTHROCYTE [DISTWIDTH] IN BLOOD BY AUTOMATED COUNT: 13 % (ref 10–15)
GFR SERPL CREATININE-BSD FRML MDRD: >90 ML/MIN/1.7M2
GLUCOSE SERPL-MCNC: 110 MG/DL (ref 70–99)
HCT VFR BLD AUTO: 43.9 % (ref 35–47)
HGB BLD-MCNC: 14.6 G/DL (ref 11.7–15.7)
LYMPHOCYTES # BLD AUTO: 2.6 10E9/L (ref 0.8–5.3)
LYMPHOCYTES NFR BLD AUTO: 29.4 %
MCH RBC QN AUTO: 28.8 PG (ref 26.5–33)
MCHC RBC AUTO-ENTMCNC: 33.3 G/DL (ref 31.5–36.5)
MCV RBC AUTO: 87 FL (ref 78–100)
MONOCYTES # BLD AUTO: 0.7 10E9/L (ref 0–1.3)
MONOCYTES NFR BLD AUTO: 7.5 %
NEUTROPHILS # BLD AUTO: 5.4 10E9/L (ref 1.6–8.3)
NEUTROPHILS NFR BLD AUTO: 61.5 %
PLATELET # BLD AUTO: 271 10E9/L (ref 150–450)
POTASSIUM SERPL-SCNC: 4.3 MMOL/L (ref 3.4–5.3)
PROT SERPL-MCNC: 7.6 G/DL (ref 6.8–8.8)
RBC # BLD AUTO: 5.07 10E12/L (ref 3.8–5.2)
SODIUM SERPL-SCNC: 141 MMOL/L (ref 133–144)
T4 FREE SERPL-MCNC: 3.97 NG/DL (ref 0.76–1.46)
TSH SERPL DL<=0.005 MIU/L-ACNC: <0.01 MU/L (ref 0.4–4)
WBC # BLD AUTO: 8.7 10E9/L (ref 4–11)

## 2018-10-05 PROCEDURE — 84439 ASSAY OF FREE THYROXINE: CPT | Performed by: NURSE PRACTITIONER

## 2018-10-05 PROCEDURE — 99214 OFFICE O/P EST MOD 30 MIN: CPT | Performed by: NURSE PRACTITIONER

## 2018-10-05 PROCEDURE — 84443 ASSAY THYROID STIM HORMONE: CPT | Performed by: NURSE PRACTITIONER

## 2018-10-05 PROCEDURE — 85025 COMPLETE CBC W/AUTO DIFF WBC: CPT | Performed by: NURSE PRACTITIONER

## 2018-10-05 PROCEDURE — 36415 COLL VENOUS BLD VENIPUNCTURE: CPT | Performed by: NURSE PRACTITIONER

## 2018-10-05 PROCEDURE — 80053 COMPREHEN METABOLIC PANEL: CPT | Performed by: NURSE PRACTITIONER

## 2018-10-05 RX ORDER — METOPROLOL SUCCINATE 25 MG/1
25 TABLET, EXTENDED RELEASE ORAL DAILY
Qty: 30 TABLET | Refills: 1 | Status: SHIPPED | OUTPATIENT
Start: 2018-10-05 | End: 2019-01-16

## 2018-10-05 RX ORDER — AZITHROMYCIN 250 MG/1
TABLET, FILM COATED ORAL
Qty: 6 TABLET | Refills: 0 | Status: SHIPPED | OUTPATIENT
Start: 2018-10-05 | End: 2019-01-16

## 2018-10-05 RX ORDER — METHIMAZOLE 10 MG/1
10 TABLET ORAL DAILY
Qty: 30 TABLET | Refills: 0 | Status: SHIPPED | OUTPATIENT
Start: 2018-10-05 | End: 2018-12-13

## 2018-10-05 NOTE — PROGRESS NOTES
"  SUBJECTIVE:   Tonya Rodriguez is a 63 year old female who presents to clinic today for the following health issues: complains of dry cough, nasal congestion, watery eyes and chills. These symptoms ongoing for about 2 weeks.  Her other concerns: weight loss, lost almost 30 lbs over the last 6 months without dieting and exercising, body shakes and tremors for about the same time-4-6 months, loss of appetite.   Denies abdominal pain and diarrhea, denies blood in stools and urine. She is self cath, denies having cloudy urine, or change in odor. She is getting UTI's, but states it is not frequent and usually her first symptom of UTI is increased urine odor, cloudy urine, which she denies now.   Had couple episodes of vomiting, denies hematemesis, denies epigastric pain.     ENT Symptoms             Symptoms: cc Present Absent Comment   Fever/Chills  x  Chills, no fever    Fatigue  x     Muscle Aches  x     Eye Irritation  x  Eyes have been swelling, itchy - has been using eye drops    Sneezing  x     Nasal Sancho/Drg  x     Sinus Pressure/Pain   x    Loss of smell   x    Dental pain   x    Sore Throat   x    Swollen Glands   x    Ear Pain/Fullness   x    Cough  x  Semi-productive    Wheeze   x    Chest Pain   x    Shortness of breath   x    Rash   x    Other  x  Loss of appetite for the past 2 weeks      Symptom duration: 2 weeks    Symptom severity:  moderate    Treatments tried: None    Contacts: None      Problem list and histories reviewed & adjusted, as indicated.  Additional history: as documented    Labs reviewed in EPIC    Reviewed and updated as needed this visit by clinical staff       Reviewed and updated as needed this visit by Provider         ROS:  Constitutional, HEENT, cardiovascular, pulmonary, gi and gu systems are negative, except as otherwise noted.    OBJECTIVE:     BP 98/70  Pulse 89  Temp 97.4  F (36.3  C) (Tympanic)  Ht 5' 9\" (1.753 m)  Wt 131 lb 14.4 oz (59.8 kg)  SpO2 99%  BMI 19.48 " kg/m2  Body mass index is 19.48 kg/(m^2).  GENERAL: healthy, alert and no distress  EYES: Eyes grossly normal to inspection, she has some exophthalmus, I am seeing this patient first time, not sure if this her usual facial expression or not  NECK: no adenopathy, no asymmetry, masses, or scars and thyroid normal to palpation  RESP: lungs clear to auscultation - no rales, rhonchi or wheezes  CV: tachycardia, normal S1 S2, no S3 or S4, no murmur, click or rub, peripheral pulses strong and no peripheral edema  MS: no gross musculoskeletal defects noted, no edema  NEURO: Normal strength and tone, sensory exam grossly normal and mentation intact  PSYCH: mentation appears normal, affect normal/bright    Diagnostic Test Results:  Results for orders placed or performed in visit on 10/05/18 (from the past 24 hour(s))   TSH with free T4 reflex   Result Value Ref Range    TSH <0.01 (L) 0.40 - 4.00 mU/L   CBC with platelets differential   Result Value Ref Range    WBC 8.7 4.0 - 11.0 10e9/L    RBC Count 5.07 3.8 - 5.2 10e12/L    Hemoglobin 14.6 11.7 - 15.7 g/dL    Hematocrit 43.9 35.0 - 47.0 %    MCV 87 78 - 100 fl    MCH 28.8 26.5 - 33.0 pg    MCHC 33.3 31.5 - 36.5 g/dL    RDW 13.0 10.0 - 15.0 %    Platelet Count 271 150 - 450 10e9/L    % Neutrophils 61.5 %    % Lymphocytes 29.4 %    % Monocytes 7.5 %    % Eosinophils 1.4 %    % Basophils 0.2 %    Absolute Neutrophil 5.4 1.6 - 8.3 10e9/L    Absolute Lymphocytes 2.6 0.8 - 5.3 10e9/L    Absolute Monocytes 0.7 0.0 - 1.3 10e9/L    Absolute Eosinophils 0.1 0.0 - 0.7 10e9/L    Absolute Basophils 0.0 0.0 - 0.2 10e9/L    Diff Method Automated Method    Comprehensive metabolic panel (BMP + Alb, Alk Phos, ALT, AST, Total. Bili, TP)   Result Value Ref Range    Sodium 141 133 - 144 mmol/L    Potassium 4.3 3.4 - 5.3 mmol/L    Chloride 106 94 - 109 mmol/L    Carbon Dioxide 30 20 - 32 mmol/L    Anion Gap 5 3 - 14 mmol/L    Glucose 110 (H) 70 - 99 mg/dL    Urea Nitrogen 15 7 - 30 mg/dL     Creatinine 0.65 0.52 - 1.04 mg/dL    GFR Estimate >90 >60 mL/min/1.7m2    GFR Estimate If Black >90 >60 mL/min/1.7m2    Calcium 10.0 8.5 - 10.1 mg/dL    Bilirubin Total 0.8 0.2 - 1.3 mg/dL    Albumin 3.6 3.4 - 5.0 g/dL    Protein Total 7.6 6.8 - 8.8 g/dL    Alkaline Phosphatase 116 40 - 150 U/L    ALT 22 0 - 50 U/L    AST 15 0 - 45 U/L   T4 free   Result Value Ref Range    T4 Free 3.97 (H) 0.76 - 1.46 ng/dL       ASSESSMENT/PLAN:     1. Special screening for malignant neoplasms, colon    - Fecal colorectal cancer screen (FIT); Future    2. Shakes  -possibly due to low grade fevers vs hyperthyroidism?   -the patient presents with classical symptoms of possible hyperthyroidism, she is complaining of significant weight loss, she has some exophthalmus on exam and she has generalized body shakes, tremors, she is also tachycardic. I ordered thyroid levels, results showed that she has almost undetectable TSH level and T4 level 2 times the upper limit of normal .  -since she is symptomatic, T 4 level is double normal limit, I started patient on Tapazole and Metoprolol and recommended to schedule with endocrinologist     - TSH with free T4 reflex  - CBC with platelets differential  - Comprehensive metabolic panel (BMP + Alb, Alk Phos, ALT, AST, Total. Bili, TP)    3. Acute bronchitis, unspecified organism  -semi-productive cough for 2 weeks, not sure if its possible related to her thyroid, but I will cover her with antibiotic for possible bronchitis. She developed cough about 2 weeks ago, also noted that her shakes and tremors got worse over the last 2 weeks   - azithromycin (ZITHROMAX) 250 MG tablet; Two tablets first day, then one tablet daily for four days.  Dispense: 6 tablet; Refill: 0    4. Hyperthyroidism  - methimazole (TAPAZOLE) 10 MG tablet; Take 1 tablet (10 mg) by mouth daily  Dispense: 30 tablet; Refill: 0  - metoprolol succinate (TOPROL-XL) 25 MG 24 hr tablet; Take 1 tablet (25 mg) by mouth daily  Dispense: 30  tablet; Refill: 1  - ENDOCRINOLOGY ADULT REFERRAL    5. Tachycardia  - metoprolol succinate (TOPROL-XL) 25 MG 24 hr tablet; Take 1 tablet (25 mg) by mouth daily  Dispense: 30 tablet; Refill: 1  - ENDOCRINOLOGY ADULT REFERRAL    See Patient Instructions    PRISCILLA Ramos [facility]

## 2018-10-05 NOTE — PATIENT INSTRUCTIONS
Recommend lab work: thyroid, complete blood count, liver, electrolytes    If labs normal for weight and insomnia can try medicine: Remeron instead of Trazodone  Ensure supplement over the counter     Start Z-pack    For cough: guaifenesin, or robitussin as needed     Drink more fluids

## 2018-10-05 NOTE — MR AVS SNAPSHOT
After Visit Summary   10/5/2018    Tonya Rodriguez    MRN: [id number]           Patient Information     Date Of Birth          1955        Visit Information        Provider Department      10/5/2018 1:20 PM Rosalba Landa APRN CNP [facility]        Today's Diagnoses     Special screening for malignant neoplasms, colon    -  1    Shakes        Acute bronchitis, unspecified organism          Care Instructions    Recommend lab work: thyroid, complete blood count, liver, electrolytes    If labs normal for weight and insomnia can try medicine: Remeron instead of Trazodone  Ensure supplement over the counter     Start Z-pack    For cough: guaifenesin, or robitussin as needed     Drink more fluids               Follow-ups after your visit        Future tests that were ordered for you today     Open Future Orders        Priority Expected Expires Ordered    Fecal colorectal cancer screen (FIT) Routine 10/26/2018 12/28/2018 10/5/2018            Who to contact     If you have questions or need follow up information about today's clinic visit or your schedule please contact [facility] directly at [phone].  Normal or non-critical lab and imaging results will be communicated to you by MyChart, letter or phone within 4 business days after the clinic has received the results. If you do not hear from us within 7 days, please contact the clinic through MyChart or phone. If you have a critical or abnormal lab result, we will notify you by phone as soon as possible.  Submit refill requests through [employer] or call your pharmacy and they will forward the refill request to us. Please allow 3 business days for your refill to be completed.          Additional Information About Your Visit        Care EveryWhere ID     This is your Care EveryWhere ID. This could be used by other organizations to access your [facility] medical records  [id number]        Your Vitals Were     Pulse  "Temperature Height Pulse Oximetry BMI (Body Mass Index)       89 97.4  F (36.3  C) (Tympanic) 5' 9\" (1.753 m) 99% 19.48 kg/m2        Blood Pressure from Last 3 Encounters:   10/05/18 98/70   08/24/17 116/60   07/10/17 107/75    Weight from Last 3 Encounters:   10/05/18 131 lb 14.4 oz (59.8 kg)   08/24/17 162 lb (73.5 kg)   07/10/17 161 lb 11.2 oz (73.3 kg)              We Performed the Following     CBC with platelets differential     Comprehensive metabolic panel (BMP + Alb, Alk Phos, ALT, AST, Total. Bili, TP)     TSH with free T4 reflex          Today's Medication Changes          These changes are accurate as of 10/5/18  1:45 PM.  If you have any questions, ask your nurse or doctor.               Start taking these medicines.        Dose/Directions    azithromycin 250 MG tablet   Commonly known as:  ZITHROMAX   Used for:  Acute bronchitis, unspecified organism   Started by:  Rosalba Landa APRN CNP        Two tablets first day, then one tablet daily for four days.   Quantity:  6 tablet   Refills:  0            Where to get your medicines      These medications were sent to [id number] - [place], [place] - [address]  [address] [address]     Phone:  [phone]     azithromycin 250 MG tablet                Primary Care Provider Office Phone # Fax #    Robb Cabrera [phone] [address] [address]        Equal Access to Services     CATHERINE PAREDES AH: Hadii aad ku hadasho Soomaali, waaxda luqadaha, qaybta kaalmada adeegyada, jade juan. So [facility] [phone].    ATENCIÓN: Si habla español, tiene a santiago disposición servicios gratuitos de asistencia lingüística. Llame al [phone].    We comply with applicable federal civil rights laws and [place] laws. We do not discriminate on the basis of race, color, national origin, age, disability, sex, sexual orientation, or gender " identity.            Thank you!     Thank you for choosing [facility]  for your care. Our goal is always to provide you with excellent care. Hearing back from our patients is one way we can continue to improve our services. Please take a few minutes to complete the written survey that you may receive in the mail after your visit with us. Thank you!             Your Updated Medication List - Protect others around you: Learn how to safely use, store and throw away your medicines at www.disposemymeds.org.          This list is accurate as of 10/5/18  1:45 PM.  Always use your most recent med list.                   Brand Name Dispense Instructions for use Diagnosis    azithromycin 250 MG tablet    ZITHROMAX    6 tablet    Two tablets first day, then one tablet daily for four days.    Acute bronchitis, unspecified organism       cycloSPORINE 0.05 % ophthalmic emulsion    RESTASIS MULTIDOSE    3 each    Place 1 drop into both eyes 2 times daily    Dry eye, Herpes simplex keratitis of left eye       * FLUoxetine 10 MG capsule    PROzac    15 capsule    Take 10 mg by mouth every other day and 20 mg by mouth on opposite days.    Recurrent major depression in complete remission (H)       * FLUoxetine 20 MG capsule    PROzac    15 capsule    Take 10 mg by mouth every other day and 20 mg by mouth on opposite days.    Recurrent major depression in complete remission (H)       gabapentin 100 MG capsule    NEURONTIN    180 capsule    Take 1-2 capsules by mouth every night at bedtime as needed for restless legs.    Restless leg       imipramine 50 MG tablet    TOFRANIL    90 tablet    Take 1 tablet (50 mg) by mouth At Bedtime    Insomnia       neomycin-polymyxin-dexamethasone 3.5-73472-1.1 Oint ophthalmic ointment    MAXITROL    2 Tube    Place 0.5 inches into both eyes At Bedtime    Ulcerative blepharitis of upper and lower eyelids of both eyes       order for DME     540 catheter    Equipment being ordered: urinary  catheter 6 times daily    Personal history of other disorder of urinary system, Multiple sclerosis (H)       oxybutynin chloride 15 MG Tb24     60 tablet    TAKE 1 TABLET (15 MG) BY MOUTH 2 TIMES DAILY    Urinary incontinence, unspecified type       simvastatin 20 MG tablet    ZOCOR    90 tablet    Take 1 tablet (20 mg) by mouth At Bedtime    Mixed hyperlipidemia       traZODone 50 MG tablet    DESYREL    30 tablet    Take 1 tablet by mouth every other night at bedtime.    Problems related to lack of adequate sleep, Depressive disorder, not elsewhere classified, Backache, unspecified, Esophageal reflux, Multiple sclerosis (H), Migraine, unspecified, with intractable migraine, so stated, without mention of status migrainosus, Hand pain, Preventative health care       valACYclovir 500 MG tablet    VALTREX    90 tablet    Take 1 tablet (500 mg) by mouth daily    Left corneal scar       * Notice:  This list has 2 medication(s) that are the same as other medications prescribed for you. Read the directions carefully, and ask your doctor or other care provider to review them with you.

## 2018-12-13 ENCOUNTER — TELEPHONE (OUTPATIENT)
Dept: FAMILY MEDICINE | Facility: CLINIC | Age: 63
End: 2018-12-13

## 2018-12-13 DIAGNOSIS — E05.90 HYPERTHYROIDISM: ICD-10-CM

## 2018-12-13 DIAGNOSIS — R32 URINARY INCONTINENCE, UNSPECIFIED TYPE: ICD-10-CM

## 2018-12-13 NOTE — TELEPHONE ENCOUNTER
"Requested Prescriptions   Pending Prescriptions Disp Refills     oxybutynin ER (DITROPAN XL) 15 MG 24 hr tablet [Pharmacy Med Name: Oxybutynin Chloride ER Oral Tablet Extended Release 24 Hour 15 MG]  Last Written Prescription Date:  9/17/18  Last Fill Quantity: 60,  # refills: 0   Last office visit: 10/5/2018 with prescribing provider:  Cordell   Future Office Visit:     60 tablet 0     Sig: TAKE ONE TABLET BY MOUTH TWICE DAILY    Muscarinic Antagonists (Urinary Incontinence Agents) Passed - 12/13/2018  3:50 PM       Passed - Recent (12 mo) or future (30 days) visit within the authorizing provider's specialty    Patient had office visit in the last 12 months or has a visit in the next 30 days with authorizing provider or within the authorizing provider's specialty.  See \"Patient Info\" tab in inbasket, or \"Choose Columns\" in Meds & Orders section of the refill encounter.             Passed - Medication is Oxybutynin and patient is 5 years of age or older       Passed - Patient does not have a diagnosis of glaucoma on the problem list    If glaucoma diagnosis is new, refer refill to physician.         Passed - Patient is 18 years of age or older          "

## 2018-12-13 NOTE — TELEPHONE ENCOUNTER
"Requested Prescriptions   Pending Prescriptions Disp Refills     methimazole (TAPAZOLE) 10 MG tablet [Pharmacy Med Name: MethIMAzole Oral Tablet 10 MG]  Last Written Prescription Date:  10/5/18  Last Fill Quantity: 30,  # refills: 0   Last office visit: 10/5/2018 with prescribing provider:  Cordell   Future Office Visit:     30 tablet 0     Sig: Take 1 tablet (10 mg) by mouth daily    Anti-Thyroid Agents Protocol Failed - 12/13/2018  3:50 PM       Failed - Refills for this medication group require provider approval       Failed - Normal TSH in past 12 months    Recent Labs   Lab Test 10/05/18  1351   TSH <0.01*             Passed - CBC is on file within the past 12 months    Recent Labs   Lab Test 10/05/18  1351   WBC 8.7   RBC 5.07   HGB 14.6   HCT 43.9                   Passed - Recent (12 mo) or future (30 days) visit within the authorizing provider's specialty    Patient had office visit in the last 12 months or has a visit in the next 30 days with authorizing provider or within the authorizing provider's specialty.  See \"Patient Info\" tab in inbasket, or \"Choose Columns\" in Meds & Orders section of the refill encounter.             Passed - Medication is active on the patient's med list       Passed - Patient is age 18 or older       Passed - No active pregnancy on record       Passed - No positive pregnancy test in past 12 months          "

## 2018-12-19 RX ORDER — METHIMAZOLE 10 MG/1
10 TABLET ORAL DAILY
Qty: 15 TABLET | Refills: 0 | Status: SHIPPED | OUTPATIENT
Start: 2018-12-19 | End: 2018-12-20

## 2018-12-19 RX ORDER — OXYBUTYNIN CHLORIDE 15 MG/1
TABLET, EXTENDED RELEASE ORAL
Qty: 30 TABLET | Refills: 0 | Status: SHIPPED | OUTPATIENT
Start: 2018-12-19 | End: 2018-12-20

## 2018-12-19 NOTE — TELEPHONE ENCOUNTER
Refilled for 2 weeks.  I have never seen this patient before. She needs to see a provider.  Robb Cabrera MD  Family Medicine

## 2018-12-19 NOTE — TELEPHONE ENCOUNTER
Patient needs to be seen for refills.  I believe that I have never seen this patient before.  Robb Cabrera MD  Family Medicine

## 2018-12-19 NOTE — TELEPHONE ENCOUNTER
Pt called stating that she needs the refill for Oxybutynin asap.  Please call patient and advise.

## 2018-12-19 NOTE — TELEPHONE ENCOUNTER
Pt called stating that she needs the refill for Tapazole  asap.  Please call patient and advise.

## 2018-12-20 NOTE — TELEPHONE ENCOUNTER
Closing this encounter.  This msg have been added to the tapazole refill request.    Geraldine RAMEY RN

## 2018-12-20 NOTE — TELEPHONE ENCOUNTER
Pt called back.  She has made her appt to see Dr Cabrera on 1/16/18.    However, she with a two week supply of tapazole and oxybutynin, pt will run out before her appt.    Pt asks if these can be sent for one-month supplies instead?  Routed to Dr Cabrera for consideration.  Pt will wait to pick these up today.    Anna Cox RN

## 2018-12-20 NOTE — TELEPHONE ENCOUNTER
Left message for patient to return call to clinic.  There is another message for her oxybutynin refill request, please give her these TWO messages:    1.  Refilled for 2 weeks.  I have never seen this patient before. She needs to see a provider.  Robb Cabrera MD    2.  Patient needs to be seen for refills.  I believe that I have never seen this patient before.  MD Geraldine Schroeder RN

## 2018-12-23 RX ORDER — METHIMAZOLE 10 MG/1
10 TABLET ORAL DAILY
Qty: 30 TABLET | Refills: 0 | Status: SHIPPED | OUTPATIENT
Start: 2018-12-23 | End: 2019-01-16

## 2018-12-23 RX ORDER — OXYBUTYNIN CHLORIDE 15 MG/1
15 TABLET, EXTENDED RELEASE ORAL 2 TIMES DAILY
Qty: 60 TABLET | Refills: 0 | Status: SHIPPED | OUTPATIENT
Start: 2018-12-23 | End: 2019-01-16

## 2019-01-16 ENCOUNTER — OFFICE VISIT (OUTPATIENT)
Dept: FAMILY MEDICINE | Facility: CLINIC | Age: 64
End: 2019-01-16
Payer: COMMERCIAL

## 2019-01-16 VITALS
HEART RATE: 65 BPM | RESPIRATION RATE: 16 BRPM | TEMPERATURE: 97.6 F | DIASTOLIC BLOOD PRESSURE: 69 MMHG | HEIGHT: 70 IN | WEIGHT: 136.13 LBS | BODY MASS INDEX: 19.49 KG/M2 | OXYGEN SATURATION: 98 % | SYSTOLIC BLOOD PRESSURE: 108 MMHG

## 2019-01-16 DIAGNOSIS — E78.5 HYPERLIPIDEMIA WITH TARGET LDL LESS THAN 130: ICD-10-CM

## 2019-01-16 DIAGNOSIS — R00.0 TACHYCARDIA: ICD-10-CM

## 2019-01-16 DIAGNOSIS — G47.00 INSOMNIA, UNSPECIFIED TYPE: ICD-10-CM

## 2019-01-16 DIAGNOSIS — R32 URINARY INCONTINENCE, UNSPECIFIED TYPE: ICD-10-CM

## 2019-01-16 DIAGNOSIS — F32.5 MAJOR DEPRESSION IN COMPLETE REMISSION (H): ICD-10-CM

## 2019-01-16 DIAGNOSIS — G25.81 RESTLESS LEG: ICD-10-CM

## 2019-01-16 DIAGNOSIS — G35 MULTIPLE SCLEROSIS (H): ICD-10-CM

## 2019-01-16 DIAGNOSIS — E05.90 HYPERTHYROIDISM: Primary | ICD-10-CM

## 2019-01-16 LAB
T3 SERPL-MCNC: 222 NG/DL (ref 60–181)
T4 FREE SERPL-MCNC: 1.93 NG/DL (ref 0.76–1.46)
TSH SERPL DL<=0.005 MIU/L-ACNC: <0.01 MU/L (ref 0.4–4)

## 2019-01-16 PROCEDURE — 99000 SPECIMEN HANDLING OFFICE-LAB: CPT | Performed by: FAMILY MEDICINE

## 2019-01-16 PROCEDURE — 86376 MICROSOMAL ANTIBODY EACH: CPT | Performed by: FAMILY MEDICINE

## 2019-01-16 PROCEDURE — 84480 ASSAY TRIIODOTHYRONINE (T3): CPT | Performed by: FAMILY MEDICINE

## 2019-01-16 PROCEDURE — 84445 ASSAY OF TSI GLOBULIN: CPT | Mod: 90 | Performed by: FAMILY MEDICINE

## 2019-01-16 PROCEDURE — 36415 COLL VENOUS BLD VENIPUNCTURE: CPT | Performed by: FAMILY MEDICINE

## 2019-01-16 PROCEDURE — 84439 ASSAY OF FREE THYROXINE: CPT | Performed by: FAMILY MEDICINE

## 2019-01-16 PROCEDURE — 84443 ASSAY THYROID STIM HORMONE: CPT | Performed by: FAMILY MEDICINE

## 2019-01-16 PROCEDURE — 99215 OFFICE O/P EST HI 40 MIN: CPT | Performed by: FAMILY MEDICINE

## 2019-01-16 RX ORDER — GABAPENTIN 100 MG/1
CAPSULE ORAL
Qty: 60 CAPSULE | Refills: 0 | Status: SHIPPED | OUTPATIENT
Start: 2019-01-16 | End: 2019-01-16

## 2019-01-16 RX ORDER — IMIPRAMINE HCL 50 MG
50 TABLET ORAL AT BEDTIME
Qty: 90 TABLET | Refills: 3 | Status: SHIPPED | OUTPATIENT
Start: 2019-01-16 | End: 2020-02-18

## 2019-01-16 RX ORDER — GABAPENTIN 100 MG/1
200 CAPSULE ORAL AT BEDTIME
Qty: 180 CAPSULE | Refills: 3 | Status: SHIPPED | OUTPATIENT
Start: 2019-01-16 | End: 2019-01-16

## 2019-01-16 RX ORDER — OXYBUTYNIN CHLORIDE 15 MG/1
15 TABLET, EXTENDED RELEASE ORAL 2 TIMES DAILY
Qty: 180 TABLET | Refills: 3 | Status: SHIPPED | OUTPATIENT
Start: 2019-01-16 | End: 2019-06-10

## 2019-01-16 RX ORDER — METHIMAZOLE 10 MG/1
10 TABLET ORAL DAILY
Qty: 30 TABLET | Refills: 1 | Status: SHIPPED | OUTPATIENT
Start: 2019-01-16 | End: 2019-02-15

## 2019-01-16 RX ORDER — GABAPENTIN 100 MG/1
200 CAPSULE ORAL AT BEDTIME
Qty: 180 CAPSULE | Refills: 3 | Status: SHIPPED | OUTPATIENT
Start: 2019-01-16 | End: 2020-04-22

## 2019-01-16 RX ORDER — METOPROLOL SUCCINATE 25 MG/1
25 TABLET, EXTENDED RELEASE ORAL DAILY
Qty: 30 TABLET | Refills: 1 | Status: SHIPPED | OUTPATIENT
Start: 2019-01-16 | End: 2019-03-14

## 2019-01-16 RX ORDER — TRAZODONE HYDROCHLORIDE 50 MG/1
50 TABLET, FILM COATED ORAL AT BEDTIME
Qty: 90 TABLET | Refills: 3 | Status: SHIPPED | OUTPATIENT
Start: 2019-01-16 | End: 2020-04-22

## 2019-01-16 ASSESSMENT — ANXIETY QUESTIONNAIRES
GAD7 TOTAL SCORE: 0
6. BECOMING EASILY ANNOYED OR IRRITABLE: NOT AT ALL
5. BEING SO RESTLESS THAT IT IS HARD TO SIT STILL: NOT AT ALL
IF YOU CHECKED OFF ANY PROBLEMS ON THIS QUESTIONNAIRE, HOW DIFFICULT HAVE THESE PROBLEMS MADE IT FOR YOU TO DO YOUR WORK, TAKE CARE OF THINGS AT HOME, OR GET ALONG WITH OTHER PEOPLE: NOT DIFFICULT AT ALL
2. NOT BEING ABLE TO STOP OR CONTROL WORRYING: NOT AT ALL
7. FEELING AFRAID AS IF SOMETHING AWFUL MIGHT HAPPEN: NOT AT ALL
3. WORRYING TOO MUCH ABOUT DIFFERENT THINGS: NOT AT ALL
1. FEELING NERVOUS, ANXIOUS, OR ON EDGE: NOT AT ALL

## 2019-01-16 ASSESSMENT — PATIENT HEALTH QUESTIONNAIRE - PHQ9
SUM OF ALL RESPONSES TO PHQ QUESTIONS 1-9: 1
5. POOR APPETITE OR OVEREATING: NOT AT ALL

## 2019-01-16 ASSESSMENT — MIFFLIN-ST. JEOR: SCORE: 1244.77

## 2019-01-16 NOTE — PROGRESS NOTES
"  SUBJECTIVE:   Tonya Rodriguez is a 63 year old female who presents to clinic today for the following health issues:      Chief Complaint   Patient presents with     Refill Request     Patient requesting refills on ALL MEDICATIONS.  Patient is NOT fasting, however.  After today, patient will be completely out of all medications.      Health Maintenance     Declines all health maintenance items today.     Patient has been out of many medications for some time.  Needs to have some refilled.    She requests prozac yet reviewed her phq9 and gad7 which were normal.  She has been using [employer] supplement for depression.  Recommend to stop since reviewing scores of phq9 and gad7    Has weight loss due to hyperthyroidism.  Taking ensure to help with weight    Will be seeing endocrinology next month.    Has MS, sees neurologist.  Will be seeing specialist in the future.  Is on meds for bladder due to MS.             Problem list and histories reviewed & adjusted, as indicated.  Additional history: as documented        Reviewed and updated as needed this visit by clinical staff  Tobacco  Allergies  Meds  Med Hx  Surg Hx  Fam Hx  Soc Hx      Reviewed and updated as needed this visit by Provider         ROS:  CONSTITUTIONAL:slight increase in weight  INTEGUMENTARY/SKIN: NEGATIVE for worrisome rashes, moles or lesions  EYES: sees eye specialist due to eye probelms  RESP:NEGATIVE for significant cough or SOB  CV: no tachycardia noted  GI: NEGATIVE for nausea, abdominal pain, heartburn, or change in bowel habits  MUSCULOSKELETAL: NEGATIVE for significant arthralgias or myalgia  NEURO: has some ongoing weakness  ENDOCRINE: as above, hyperthyroid, no further work is done  PSYCHIATRIC: stable and reviewed sheets    OBJECTIVE:                                                    /69   Pulse 65   Temp 97.6  F (36.4  C) (Tympanic)   Resp 16   Ht 1.765 m (5' 9.5\")   Wt 61.7 kg (136 lb 2 oz)   SpO2 98%   Breastfeeding? No  "  BMI 19.81 kg/m     Body mass index is 19.81 kg/m .  GENERAL APPEARANCE: alert, no distress and Nourishment slim   HENT: ear canals and TM's normal and nose and mouth without ulcers or lesions  NECK: no adenopathy, no asymmetry, masses, or scars and thyroid normal to palpation  RESP: lungs clear to auscultation - no rales, rhonchi or wheezes  CV: regular rates and rhythm, normal S1 S2, no S3 or S4 and no murmur, click or rub  ABDOMEN: soft, nontender, without hepatosplenomegaly or masses and bowel sounds normal  MS: extremities normal- no gross deformities noted  SKIN: no suspicious lesions or rashes  NEURO: mentation intact, speech normal and slight decrease in strenghth  PSYCH: mentation appears normal and affect normal/bright         ASSESSMENT/PLAN:                                                    1. Hyperthyroidism  Recommend to recheck and do further labs, refilled current meds, would likely get a thyroid scan up take, but I want the labs first  - TSH  - T4 FREE  - Thyroid peroxidase antibody  - Thyroid stimulating immunoglobulin  - T3 total  - metoprolol succinate ER (TOPROL-XL) 25 MG 24 hr tablet; Take 1 tablet (25 mg) by mouth daily  Dispense: 30 tablet; Refill: 1  - methimazole (TAPAZOLE) 10 MG tablet; Take 1 tablet (10 mg) by mouth daily  Dispense: 30 tablet; Refill: 1    2. Tachycardia  controlled  - metoprolol succinate ER (TOPROL-XL) 25 MG 24 hr tablet; Take 1 tablet (25 mg) by mouth daily  Dispense: 30 tablet; Refill: 1    3. Restless leg  Refilled med for RLS  - gabapentin (NEURONTIN) 100 MG capsule; Take 2 capsules (200 mg) by mouth At Bedtime  Dispense: 180 capsule; Refill: 3    4. Urinary incontinence, unspecified type  Refilled med  - oxybutynin ER (DITROPAN XL) 15 MG 24 hr tablet; Take 1 tablet (15 mg) by mouth 2 times daily  Dispense: 180 tablet; Refill: 3  - imipramine (TOFRANIL) 50 MG tablet; Take 1 tablet (50 mg) by mouth At Bedtime  Dispense: 90 tablet; Refill: 3    5. Multiple sclerosis  (H)  Seeing specialist  - oxybutynin ER (DITROPAN XL) 15 MG 24 hr tablet; Take 1 tablet (15 mg) by mouth 2 times daily  Dispense: 180 tablet; Refill: 3  - imipramine (TOFRANIL) 50 MG tablet; Take 1 tablet (50 mg) by mouth At Bedtime  Dispense: 90 tablet; Refill: 3    6. Insomnia  Refilled med, has not had this in a long time.      7. Hyperlipidemia with target LDL less than 130    - Lipid panel reflex to direct LDL Fasting; Future    8. Insomnia, unspecified type  Refilled but thyroid many be causing this too  - traZODone (DESYREL) 50 MG tablet; Take 1 tablet (50 mg) by mouth At Bedtime  Dispense: 90 tablet; Refill: 3    Resolved depression at this time.    See Patient Instructions  Counseling/Coordination of care is over 20 min in 40 min appt.    Likely follow up in future after I get further labs and tests.    Robb Cabrera MD  [facility]

## 2019-01-16 NOTE — PATIENT INSTRUCTIONS
Please go to lab.    I am doing some further investigation regarding your over active thyroid.    I am recommend to get another test too.    I did refill most of your medications.    Please get a fasting cholesterol to see if you still need your simvastatin.    I want to wait briefly on your fluoxetine since you have been out.          Thank you for choosing [facility].  You may be receiving a survey in the mail from Pete Richardson regarding your visit today.  Please take a few minutes to complete and return the survey to let us know how we are doing.      If you have questions or concerns, please contact us via [employer] or you can contact your care team at [phone].    Our Clinic hours are:  Monday 6:40 am  to 7:00 pm  Tuesday -Friday 6:40 am to 5:00 pm    The [place] outpatient lab hours are:  Monday - Friday 6:10 am to 4:45 pm  Saturdays 7:00 am to 11:00 am  Appointments are required, call [phone]    If you have clinical questions after hours or would like to schedule an appointment,  call the clinic at [phone].

## 2019-01-17 ENCOUNTER — TELEPHONE (OUTPATIENT)
Dept: FAMILY MEDICINE | Facility: CLINIC | Age: 64
End: 2019-01-17

## 2019-01-17 LAB — THYROPEROXIDASE AB SERPL-ACNC: <10 IU/ML

## 2019-01-17 ASSESSMENT — ANXIETY QUESTIONNAIRES: GAD7 TOTAL SCORE: 0

## 2019-01-17 NOTE — TELEPHONE ENCOUNTER
Reason for Call:  Other call back    Detailed comments: Patient wants to know how long she has to be off her medication to do the thyroid scan.    Phone Number Patient can be reached at: Home number on file [phone] (home)    Best Time: any    Can we leave a detailed message on this number? YES    Call taken on 1/17/2019 at 2:37 PM by Stephanie Ty

## 2019-01-18 NOTE — TELEPHONE ENCOUNTER
Patient informed of message below from provider.  Confirmed information from provider with patient teach-back. Patient verbalizes understanding, agrees to plan of care, and has no further questions.

## 2019-01-18 NOTE — TELEPHONE ENCOUNTER
Please check with nuclear medicine to see how long she is to be off her medication which would be if they have a recommendation.  The methimazole can be in her system for up to 3 days according to up to date.  Robb Cabrera MD  Family Medicine

## 2019-01-18 NOTE — TELEPHONE ENCOUNTER
Spoke to Alessandro with Nuclear medicine, reports Patient must be 1 month clear of Methimazole before nuclear scan.   However, Some provider want patient to be on medication to see the effectiveness of the medication  Vs. Off the medication.     Which does provider prefer?    Provider please review and advise. Thank you.

## 2019-01-18 NOTE — TELEPHONE ENCOUNTER
Please call Tonya,  Since she is going to see endocrinology next month and for the scan she would be off the medication for one month prior, I have decided to defer this test at this time.  Please see endocrinology. I am suspecting she may have graves disease.  Robb Cabrera MD  Family Medicine

## 2019-01-21 LAB — TSI SER-ACNC: 7.4 TSI INDEX

## 2019-02-08 ENCOUNTER — TELEPHONE (OUTPATIENT)
Dept: FAMILY MEDICINE | Facility: CLINIC | Age: 64
End: 2019-02-08

## 2019-02-08 DIAGNOSIS — E78.5 HYPERLIPIDEMIA WITH TARGET LDL LESS THAN 130: ICD-10-CM

## 2019-02-08 LAB
CHOLEST SERPL-MCNC: 220 MG/DL
HDLC SERPL-MCNC: 63 MG/DL
LDLC SERPL CALC-MCNC: 135 MG/DL
NONHDLC SERPL-MCNC: 157 MG/DL
TRIGL SERPL-MCNC: 109 MG/DL

## 2019-02-08 PROCEDURE — 36415 COLL VENOUS BLD VENIPUNCTURE: CPT | Performed by: FAMILY MEDICINE

## 2019-02-08 PROCEDURE — 80061 LIPID PANEL: CPT | Performed by: FAMILY MEDICINE

## 2019-02-08 NOTE — LETTER
February 8, 2019      Tonya Rodriguez  [address] [place] [id number]          Dear Tonya Rodriguez,Please complete your fit test     At [facility] we care about your health and are committed to providing quality patient care, which includes staying current on preventative cancer screenings.  You can increase your chances of finding and treating cancers through regular screenings.      Our records show that you are due for the following screening(s):      Colonoscopy for colon cancer - Call [phone] to schedule   Recommended every ten years for everyone age 50 and older  Please take a moment to read over the enclosed information packet about colon cancer screening.   We strongly urge our patient's to consider having a colonoscopy done, which is the best screening test available and only needs to be done every 10 years if normal.      Mammogram for breast cancer - [phone] to schedule  Recommended every 1-2 years for women age 50 and older  Mammograms help detect breast cancer, which is the most common cancer among women in the [place].  You may need to start having mammograms earlier and more often if you have had breast cancer, breast problems, or a family history of breast cancer.     If you have a My-Chart Account, you also can schedule this appointment through there.    If you have already had one or all of the above screening tests at another facility, please call us so that we may update your chart.      Your partners in health,      Quality Committee   [facility]

## 2019-02-08 NOTE — TELEPHONE ENCOUNTER
Panel Management Review      Patient has the following on her problem list: None      Composite cancer screening  Chart review shows that this patient is due/due soon for the following Mammogram and Fecal Colorectal (FIT)  Summary:    Patient is due/failing the following:   FIT and MAMMOGRAM    Action needed:   Patient needs to complete her fit test and schedule a mammogram     Type of outreach:    Sent letter.    Questions for provider review:    None                                                                                                                                    Tejal Arango

## 2019-02-13 ENCOUNTER — OFFICE VISIT (OUTPATIENT)
Dept: ENDOCRINOLOGY | Facility: CLINIC | Age: 64
End: 2019-02-13
Payer: COMMERCIAL

## 2019-02-13 VITALS
HEART RATE: 88 BPM | BODY MASS INDEX: 19.76 KG/M2 | HEIGHT: 70 IN | WEIGHT: 138 LBS | DIASTOLIC BLOOD PRESSURE: 68 MMHG | SYSTOLIC BLOOD PRESSURE: 114 MMHG

## 2019-02-13 DIAGNOSIS — E05.00 GRAVES' DISEASE: ICD-10-CM

## 2019-02-13 DIAGNOSIS — E05.90 HYPERTHYROIDISM: Primary | ICD-10-CM

## 2019-02-13 DIAGNOSIS — E05.00 GRAVES' OPHTHALMOPATHY: ICD-10-CM

## 2019-02-13 LAB
ANION GAP SERPL CALCULATED.3IONS-SCNC: 4 MMOL/L (ref 3–14)
BUN SERPL-MCNC: 25 MG/DL (ref 7–30)
CALCIUM SERPL-MCNC: 9.7 MG/DL (ref 8.5–10.1)
CHLORIDE SERPL-SCNC: 108 MMOL/L (ref 94–109)
CO2 SERPL-SCNC: 30 MMOL/L (ref 20–32)
CREAT SERPL-MCNC: 0.68 MG/DL (ref 0.52–1.04)
ERYTHROCYTE [DISTWIDTH] IN BLOOD BY AUTOMATED COUNT: 13.6 % (ref 10–15)
GFR SERPL CREATININE-BSD FRML MDRD: >90 ML/MIN/{1.73_M2}
GLUCOSE SERPL-MCNC: 107 MG/DL (ref 70–99)
HCT VFR BLD AUTO: 44 % (ref 35–47)
HGB BLD-MCNC: 14.4 G/DL (ref 11.7–15.7)
MCH RBC QN AUTO: 28.9 PG (ref 26.5–33)
MCHC RBC AUTO-ENTMCNC: 32.7 G/DL (ref 31.5–36.5)
MCV RBC AUTO: 88 FL (ref 78–100)
PLATELET # BLD AUTO: 342 10E9/L (ref 150–450)
POTASSIUM SERPL-SCNC: 4.1 MMOL/L (ref 3.4–5.3)
RBC # BLD AUTO: 4.98 10E12/L (ref 3.8–5.2)
SODIUM SERPL-SCNC: 142 MMOL/L (ref 133–144)
T4 FREE SERPL-MCNC: 1.85 NG/DL (ref 0.76–1.46)
TSH SERPL DL<=0.005 MIU/L-ACNC: <0.01 MU/L (ref 0.4–4)
WBC # BLD AUTO: 10.7 10E9/L (ref 4–11)

## 2019-02-13 PROCEDURE — 80048 BASIC METABOLIC PNL TOTAL CA: CPT | Performed by: INTERNAL MEDICINE

## 2019-02-13 PROCEDURE — 36415 COLL VENOUS BLD VENIPUNCTURE: CPT | Performed by: INTERNAL MEDICINE

## 2019-02-13 PROCEDURE — 85027 COMPLETE CBC AUTOMATED: CPT | Performed by: INTERNAL MEDICINE

## 2019-02-13 PROCEDURE — 99000 SPECIMEN HANDLING OFFICE-LAB: CPT | Performed by: INTERNAL MEDICINE

## 2019-02-13 PROCEDURE — 84443 ASSAY THYROID STIM HORMONE: CPT | Performed by: INTERNAL MEDICINE

## 2019-02-13 PROCEDURE — 84445 ASSAY OF TSI GLOBULIN: CPT | Mod: 90 | Performed by: INTERNAL MEDICINE

## 2019-02-13 PROCEDURE — 99205 OFFICE O/P NEW HI 60 MIN: CPT | Performed by: INTERNAL MEDICINE

## 2019-02-13 PROCEDURE — 84439 ASSAY OF FREE THYROXINE: CPT | Performed by: INTERNAL MEDICINE

## 2019-02-13 ASSESSMENT — MIFFLIN-ST. JEOR: SCORE: 1253.27

## 2019-02-13 NOTE — LETTER
"    2/13/2019         RE: Tonya Rodriguez  [address]  [place] [id number]        Dear Colleague,    Thank you for referring your patient, Tonya Rodriguez, to the [facility]. Please see a copy of my visit note below.    Name: Tonya Rodriguez is a 63 year old woman, seen at the request of Dr. Ankur Cabrera for evaluation of     Chief Complaint   Patient presents with     Thyroid Problem     Hyperthyroidism       HPI:  Recent issues:  Here for evaluation of thyroid problem  Reviewed medical history from patient and Epic chart record        1990's. Diagnosed with multiple sclerosis (MS), clinicians wondered if present 10+yrs previous  Has lived in [facility]  Symptoms decreased vision, leg weakness and falls, speech symptoms, also incomplete bladder emptying  Neurology evaluations with Dr. MERVIN Mukherjee    Previous eye problems  ~2013. Acute problem with left eye redness  Medical evaluation at [facility], subsequent evaluation at [facility]   Saw Dr. Bray/ophthalmology  Patient recalls comment about \"herpes\" infection of the eye, treatment with eye drops left eye, also Valtrex pills  Subsequent issues with eye swelling, has seen Dr. Kush Gutierrez/[facility] Ophthalmology dept  Treatment with eye drops, also \"cold pack\" vs \"hot pack\" to both eyes 3-4x/day  9/4/18. Eye evaluation with Dr. Gutierrez, diagnoses Blepharitis, H/O Herpes keratitis, left eye:, Cornea scar, left eye, Dry eye, both eyes     Progressive symptoms included weight loss ~35#, insomnia, decreased appetite   Also tremors, faster heartrate, muscle weakness legs/arms, fatigue  10/5/18. Med evaluation with Rosalba Landa CNP/[facility]   Diagnosis of hyperthyroidism   Began hyperthyroidism treatment with methimazole and metoprolol medication    Previous  thyroid tests include:      1/16/19. Medical evaluation with Dr. Ankur Cabrera/[facility]  Since starting these meds, no apparent symptom " change/improvement    Recent [facility] labs include:  Lab Results   Component Value Date    TSH <0.01 (L) 02/13/2019    T4 1.85 (H) 02/13/2019    TPO <10 01/16/2019     Current meds include:  Methimazole 10 mg po every day, metoprololER 25 mg QD      , lives in [facility]  Sees Dr. Ankur Cabrera/[facility] for general medicine evaluations.    PMH/PSH:  Past Medical History:   Diagnosis Date     Allergic rhinitis, cause unspecified 6/19/2005    nasonax     Chest pain, unspecified 3/22/04    10/10 initially inseverity - responded to Aspirin and two doses of Nitroglycerin sublingual      Depressive disorder, not elsewhere classified 1/29/2007    on buspar     Herpes simplex with other ophthalmic complications     on valtrex     Lipoma of unspecified site     Lower left suprascapular     Migraine, unspecified, with intractable migraine, so stated, without mention of status migrainosus 1/20/2007    midrin - about 30 pill a year     Multiple sclerosis (H) dx in 1980's     Personal history of other disorder of urinary system     self caths - recurrent UTIs     Past Surgical History:   Procedure Laterality Date     APPENDECTOMY  1980's    Retained suture     ARTHROPLASTY HIP  9/19/2012    Procedure: ARTHROPLASTY HIP;  Right Total Hip Minimally Invasive Surgery;  Surgeon: Devendra Douglas MD;  Location: [facility] OR     ARTHROSCOPY KNEE RT/LT  1989    Arthroscopy of the Left Knee     BUNIONECTOMY RT/LT      Bilateral bunionectomies x4 surgeries     EXCISE LESION TRUNK N/A 4/17/2015    Procedure: EXCISE LESION TRUNK;  Surgeon: Wander Gutierrez MD;  Location: [facility] OR     SURGICAL HISTORY OF -   1974, 1977    two normal deliveries     TUBAL LIGATION  1981-82       Family Hx:  Family History   Problem Relation Age of Onset     Hypertension Mother      Heart Disease Mother      Cancer Father         lung     Heart Disease Father      Diabetes Father      Heart Disease Maternal Grandfather      Cancer Sister          "cervical     Heart Disease Son         mummer     Alcohol/Drug Brother      Genitourinary Problems Brother         stones     Coronary Artery Disease Brother         injury \" maker\" tree fell on him     Glaucoma No family hx of      Macular Degeneration No family hx of          Social Hx:  Social History     Socioeconomic History     Marital status:      Spouse name: Not on file     Number of children: Not on file     Years of education: Not on file     Highest education level: Not on file   Social Needs     Financial resource strain: Not on file     Food insecurity - worry: Not on file     Food insecurity - inability: Not on file     Transportation needs - medical: Not on file     Transportation needs - non-medical: Not on file   Occupational History     Not on file   Tobacco Use     Smoking status: Current Every Day Smoker     Packs/day: 1.00     Years: 45.00     Pack years: 45.00     Types: Cigarettes     Smokeless tobacco: Never Used   Substance and Sexual Activity     Alcohol use: No     Drug use: No     Sexual activity: Not Currently     Partners: Male   Other Topics Concern     Parent/sibling w/ CABG, MI or angioplasty before 65F 55M? Not Asked      Service No     Blood Transfusions No     Caffeine Concern Yes     Comment: 3 cups and 1 can     Occupational Exposure No     Hobby Hazards No     Sleep Concern No     Stress Concern No     Weight Concern No     Special Diet No     Back Care Yes     Comment: hurt years ago Jr in High School, low back     Exercise Yes     Bike Helmet No     Seat Belt Yes     Self-Exams Not Asked   Social History Narrative     Not on file          MEDICATIONS:  has a current medication list which includes the following prescription(s): gabapentin, imipramine, methimazole, metoprolol succinate er, oxybutynin er, trazodone, valacyclovir, order for dme, and simvastatin.    ROS:     ROS: 10 point ROS neg other than the symptoms noted above in the HPI.    GENERAL: " "fatigue, wt loss; denies fevers, chills, night sweats.   HEENT: no dysphagia, odonophagia, diplopia, neck pain  THYROID:  no apparent hyper or hypothyroid symptoms  CV: rapid heart rate; no chest pain, pressure  LUNGS: no SOB, MILTON, cough, wheezing   ABDOMEN: no diarrhea, constipation, abdominal pain  EXTREMITIES: tremors; no rashes, ulcers, edema  NEUROLOGY: balance problems, slowed gait; no headaches, denies changes in vision, tingling  MSK: significant leg weakness  SKIN: no rashes or lesions  : no menses, denies hot flashes  PSYCH:  stable mood, no significant anxiety or depression  ENDOCRINE: no heat or cold intolerance    Physical Exam   VS: /68   Pulse 88   Ht 1.765 m (5' 9.5\")   Wt 62.6 kg (138 lb)   BMI 20.09 kg/m     GENERAL: AXOX3, NAD, eyeglasses, cachectic appearance, well dressed, answering questions appropriately, appears stated age.  THYROID:  Enlarged gland, size estimate 40 gm, no palpable nodules  HEENT: bilateral exophthalmos, mild lower lid chemosis and scleral injection; neck non-tender, EOMI  CV: RRR, no rubs, gallops, no murmurs  LUNGS: CTAB, no wheezes, rales, or ronchi  ABDOMEN: soft, nontender, nondistended  EXTREMITIES: hand tremors bilaterally, no edema, +pedal pulses, no lesions  NEUROLOGY: CN grossly intact, no tremors  MSK: grossly intact  SKIN: no rashes, no lesions    LABS:    All pertinent notes, labs, and images personally reviewed by me.     A/P:  Encounter Diagnoses   Name Primary?     Hyperthyroidism Yes     Graves' disease      Graves' ophthalmopathy        Comments:  Reviewed health history and the hyperthyroidism issues.  She has symptomatic hyperthyroidism and bilateral exophthalmos  Discussed similarity of symptoms with MS and hyperthyroidism    Plan:  Discussed general issues with the hyperthyroidism diagnosis and management  Reviewed thyroid gland anatomy and hormone physiology  Discussed lab tests used to assess patient thyroid hormone levels  Reviewed " probable diagnosis of Graves' disease, Graves' ophthalmopathy  We discussed treatment options with antithyroid medication use vs radioiodine ablation, observation plan    Recommend:  Lengthy conversation about the hyperthyroidism illness, probable association of the thyroid antibody level and ophthalmopathy   Needs followup ophthalmology evaluation, advise [facility]   Suspect she needs MRI of the orbits, trial of FML steroid eye drops... Will defer to ophthalmologist  Would continue treatment plan with the methimazole antithyroid medication, not pursue radioiodine ablation  Continue methimazole but raise dose as 20 mg daily  Continue metoprololER 25 mg daily  Check lab tests today, including the thyroid stimulating antibody (TSI) level  Monitor for symptom changes  Will summarize results and treatment plan with letter to patient soon, also phone call to her    Addressed patient questions today    Labs ordered today:   Orders Placed This Encounter   Procedures     T4 free     TSH     Thyroid stimulating immunoglobulin     CBC with platelets     Basic metabolic panel     OPHTHALMOLOGY ADULT REFERRAL     Radiology/Consults ordered today: OPHTHALMOLOGY ADULT REFERRAL    More than 50% of the time spent with Ms. Rodriguez on counseling / coordinating her care.  Total appointment time was 60 minutes.    Follow-up:  Lab appt 2 mo, evaluation 4 mo    Devendra Morton MD  Endocrinology  [facility]  CC: DORA Cabrera and NAT Oscar      Again, thank you for allowing me to participate in the care of your patient.        Sincerely,        Devendra Morton MD

## 2019-02-13 NOTE — PROGRESS NOTES
"Name: Tonya Rodriguez is a 63 year old woman, seen at the request of Dr. Ankur Cabrera for evaluation of     Chief Complaint   Patient presents with     Thyroid Problem     Hyperthyroidism       HPI:  Recent issues:  Here for evaluation of thyroid problem  Reviewed medical history from patient and Epic chart record        1990's. Diagnosed with multiple sclerosis (MS), clinicians wondered if present 10+yrs previous  Has lived in [facility]  Symptoms decreased vision, leg weakness and falls, speech symptoms, also incomplete bladder emptying  Neurology evaluations with Dr. MERVIN Mukherjee    Previous eye problems  ~2013. Acute problem with left eye redness  Medical evaluation at [facility], subsequent [place]   Saw Dr. Bray/ophthalmology  Patient recalls comment about \"herpes\" infection of the eye, treatment with eye drops left eye, also Valtrex pills  Subsequent issues with eye swelling, has seen Dr. Kush Gutierrez/[place] Op[facility] dept  Treatment with eye drops, also \"cold pack\" vs \"hot pack\" to both eyes 3-4x/day  9/4/18. Eye evaluation with Dr. Gutierrez, diagnoses Blepharitis, H/O Herpes keratitis, left eye:, Cornea scar, left eye, Dry eye, both eyes     Progressive symptoms included weight loss ~35#, insomnia, decreased appetite   Also tremors, faster heartrate, muscle weakness legs/arms, fatigue  10/5/18. Med evaluation with Rosalba Landa CNP/[facility]   Diagnosis of hyperthyroidism   Began hyperthyroidism treatment with methimazole and metoprolol medication    Previous  thyroid tests include:      1/16/19. Medical evaluation with Dr. Ankur Cabrera/[facility]  Since starting these meds, no apparent symptom change/improvement    Recent  labs include:  Lab Results   Component Value Date    TSH <0.01 (L) 02/13/2019    T4 1.85 (H) 02/13/2019    TPO <10 01/16/2019     Current meds include:  Methimazole 10 mg po every day, metoprololER 25 mg QD      , lives " "in [facility]  Sees Dr. Ankur Cabrera/[facility] for general medicine evaluations.    PMH/PSH:  Past Medical History:   Diagnosis Date     Allergic rhinitis, cause unspecified 6/19/2005    nasonax     Chest pain, unspecified 3/22/04    10/10 initially inseverity - responded to Aspirin and two doses of Nitroglycerin sublingual      Depressive disorder, not elsewhere classified 1/29/2007    on buspar     Herpes simplex with other ophthalmic complications     on valtrex     Lipoma of unspecified site     Lower left suprascapular     Migraine, unspecified, with intractable migraine, so stated, without mention of status migrainosus 1/20/2007    midrin - about 30 pill a year     Multiple sclerosis (H) dx in 1980's     Personal history of other disorder of urinary system     self caths - recurrent UTIs     Past Surgical History:   Procedure Laterality Date     APPENDECTOMY  1980's    Retained suture     ARTHROPLASTY HIP  9/19/2012    Procedure: ARTHROPLASTY HIP;  Right Total Hip Minimally Invasive Surgery;  Surgeon: Devendra Douglas MD;  Location: [facility] OR     ARTHROSCOPY KNEE RT/LT  1989    Arthroscopy of the Left Knee     BUNIONECTOMY RT/LT      Bilateral bunionectomies x4 surgeries     EXCISE LESION TRUNK N/A 4/17/2015    Procedure: EXCISE LESION TRUNK;  Surgeon: Wander Gutierrez MD;  Location: [facility] OR     SURGICAL HISTORY OF -   1974, 1977    two normal deliveries     TUBAL LIGATION  1981-82       Family Hx:  Family History   Problem Relation Age of Onset     Hypertension Mother      Heart Disease Mother      Cancer Father         lung     Heart Disease Father      Diabetes Father      Heart Disease Maternal Grandfather      Cancer Sister         cervical     Heart Disease Son         mummer     Alcohol/Drug Brother      Genitourinary Problems Brother         stones     Coronary Artery Disease Brother         injury \" maker\" tree fell on him     Glaucoma No family hx of      Macular Degeneration No " family hx of          Social Hx:  Social History     Socioeconomic History     Marital status:      Spouse name: Not on file     Number of children: Not on file     Years of education: Not on file     Highest education level: Not on file   Social Needs     Financial resource strain: Not on file     Food insecurity - worry: Not on file     Food insecurity - inability: Not on file     Transportation needs - medical: Not on file     Transportation needs - non-medical: Not on file   Occupational History     Not on file   Tobacco Use     Smoking status: Current Every Day Smoker     Packs/day: 1.00     Years: 45.00     Pack years: 45.00     Types: Cigarettes     Smokeless tobacco: Never Used   Substance and Sexual Activity     Alcohol use: No     Drug use: No     Sexual activity: Not Currently     Partners: Male   Other Topics Concern     Parent/sibling w/ CABG, MI or angioplasty before 65F 55M? Not Asked      Service No     Blood Transfusions No     Caffeine Concern Yes     Comment: 3 cups and 1 can     Occupational Exposure No     Hobby Hazards No     Sleep Concern No     Stress Concern No     Weight Concern No     Special Diet No     Back Care Yes     Comment: hurt years ago Jr in High School, low back     Exercise Yes     Bike Helmet No     Seat Belt Yes     Self-Exams Not Asked   Social History Narrative     Not on file          MEDICATIONS:  has a current medication list which includes the following prescription(s): gabapentin, imipramine, methimazole, metoprolol succinate er, oxybutynin er, trazodone, valacyclovir, order for dme, and simvastatin.    ROS:     ROS: 10 point ROS neg other than the symptoms noted above in the HPI.    GENERAL: fatigue, wt loss; denies fevers, chills, night sweats.   HEENT: no dysphagia, odonophagia, diplopia, neck pain  THYROID:  no apparent hyper or hypothyroid symptoms  CV: rapid heart rate; no chest pain, pressure  LUNGS: no SOB, MILTON, cough, wheezing   ABDOMEN: no  "diarrhea, constipation, abdominal pain  EXTREMITIES: tremors; no rashes, ulcers, edema  NEUROLOGY: balance problems, slowed gait; no headaches, denies changes in vision, tingling  MSK: significant leg weakness  SKIN: no rashes or lesions  : no menses, denies hot flashes  PSYCH:  stable mood, no significant anxiety or depression  ENDOCRINE: no heat or cold intolerance    Physical Exam   VS: /68   Pulse 88   Ht 1.765 m (5' 9.5\")   Wt 62.6 kg (138 lb)   BMI 20.09 kg/m    GENERAL: AXOX3, NAD, eyeglasses, cachectic appearance, well dressed, answering questions appropriately, appears stated age.  THYROID:  Enlarged gland, size estimate 40 gm, no palpable nodules  HEENT: bilateral exophthalmos, mild lower lid chemosis and scleral injection; neck non-tender, EOMI  CV: RRR, no rubs, gallops, no murmurs  LUNGS: CTAB, no wheezes, rales, or ronchi  ABDOMEN: soft, nontender, nondistended  EXTREMITIES: hand tremors bilaterally, no edema, +pedal pulses, no lesions  NEUROLOGY: CN grossly intact, no tremors  MSK: grossly intact  SKIN: no rashes, no lesions    LABS:    All pertinent notes, labs, and images personally reviewed by me.     A/P:  Encounter Diagnoses   Name Primary?     Hyperthyroidism Yes     Graves' disease      Graves' ophthalmopathy        Comments:  Reviewed health history and the hyperthyroidism issues.  She has symptomatic hyperthyroidism and bilateral exophthalmos  Discussed similarity of symptoms with MS and hyperthyroidism    Plan:  Discussed general issues with the hyperthyroidism diagnosis and management  Reviewed thyroid gland anatomy and hormone physiology  Discussed lab tests used to assess patient thyroid hormone levels  Reviewed probable diagnosis of Graves' disease, Graves' ophthalmopathy  We discussed treatment options with antithyroid medication use vs radioiodine ablation, observation plan    Recommend:  Lengthy conversation about the hyperthyroidism illness, probable association of the " thyroid antibody level and ophthalmopathy   Needs followup ophthalmology evaluation, advise [facility]   Suspect she needs MRI of the orbits, trial of FML steroid eye drops... Will defer to ophthalmologist  Would continue treatment plan with the methimazole antithyroid medication, not pursue radioiodine ablation  Continue methimazole but raise dose as 20 mg daily  Continue metoprololER 25 mg daily  Check lab tests today, including the thyroid stimulating antibody (TSI) level  Monitor for symptom changes  Will summarize results and treatment plan with letter to patient soon, also phone call to her    Addressed patient questions today    Labs ordered today:   Orders Placed This Encounter   Procedures     T4 free     TSH     Thyroid stimulating immunoglobulin     CBC with platelets     Basic metabolic panel     OPHTHALMOLOGY ADULT REFERRAL     Radiology/Consults ordered today: OPHTHALMOLOGY ADULT REFERRAL    More than 50% of the time spent with Ms. Rodriguez on counseling / coordinating her care.  Total appointment time was 60 minutes.    Follow-up:  Lab appt 2 mo, evaluation 4 mo    Devendra Morton MD  Endocrinology  [facility]  CC: DORA Cabrera and NAT Oscar

## 2019-02-15 DIAGNOSIS — E05.90 HYPERTHYROIDISM: Primary | ICD-10-CM

## 2019-02-15 DIAGNOSIS — E05.90 HYPERTHYROIDISM: ICD-10-CM

## 2019-02-15 LAB — TSI SER-ACNC: 6 TSI INDEX

## 2019-02-15 RX ORDER — METHIMAZOLE 10 MG/1
TABLET ORAL
Qty: 60 TABLET | Refills: 5 | Status: SHIPPED | OUTPATIENT
Start: 2019-02-15 | End: 2019-05-09

## 2019-03-05 ENCOUNTER — OFFICE VISIT (OUTPATIENT)
Dept: OPHTHALMOLOGY | Facility: CLINIC | Age: 64
End: 2019-03-05
Attending: OPHTHALMOLOGY
Payer: COMMERCIAL

## 2019-03-05 ENCOUNTER — ANCILLARY PROCEDURE (OUTPATIENT)
Dept: CT IMAGING | Facility: CLINIC | Age: 64
End: 2019-03-05
Attending: OPHTHALMOLOGY
Payer: COMMERCIAL

## 2019-03-05 DIAGNOSIS — E07.9 THYROID EYE DISEASE: ICD-10-CM

## 2019-03-05 DIAGNOSIS — H57.89 THYROID EYE DISEASE: ICD-10-CM

## 2019-03-05 DIAGNOSIS — E07.9 THYROID EYE DISEASE: Primary | ICD-10-CM

## 2019-03-05 DIAGNOSIS — H57.89 THYROID EYE DISEASE: Primary | ICD-10-CM

## 2019-03-05 PROCEDURE — G0463 HOSPITAL OUTPT CLINIC VISIT: HCPCS | Mod: ZF | Performed by: TECHNICIAN/TECHNOLOGIST

## 2019-03-05 PROCEDURE — 92285 EXTERNAL OCULAR PHOTOGRAPHY: CPT | Mod: ZF | Performed by: OPHTHALMOLOGY

## 2019-03-05 ASSESSMENT — REFRACTION_WEARINGRX
OD_CYLINDER: +0.75
OD_ADD: +2.50
OD_SPHERE: -8.00
OD_AXIS: 167
OS_ADD: +2.50
OS_AXIS: 180
OS_CYLINDER: +2.50
SPECS_TYPE: PAL
OS_SPHERE: -9.00

## 2019-03-05 ASSESSMENT — CONF VISUAL FIELD
METHOD: COUNTING FINGERS
OS_INFERIOR_TEMPORAL_RESTRICTION: 3
OD_NORMAL: 1

## 2019-03-05 ASSESSMENT — MARGIN REFLEX DISTANCE
OS_MRD1: 6
OD_MRD1: 4

## 2019-03-05 ASSESSMENT — EXTERNAL EXAM - LEFT EYE: OS_EXAM: PROPTOSIS

## 2019-03-05 ASSESSMENT — TONOMETRY
OD_IOP_MMHG: 19
OS_IOP_MMHG: 20
IOP_METHOD: ICARE

## 2019-03-05 ASSESSMENT — VISUAL ACUITY
OD_CC: 20/40
OS_CC: 20/200
CORRECTION_TYPE: GLASSES
METHOD: SNELLEN - LINEAR

## 2019-03-05 ASSESSMENT — EXTERNAL EXAM - RIGHT EYE: OD_EXAM: PROPTOSIS

## 2019-03-05 ASSESSMENT — CUP TO DISC RATIO
OS_RATIO: 0.65
OD_RATIO: 0.3

## 2019-03-05 NOTE — PROGRESS NOTES
Assessment & Plan     HPI: Tonya Rodriguez is a 63 year old is a female who presents for thyroid eye disease evaluation. She had been following with Dr. Gutierrez for blepharitis and dry eyes. Reports she started noticing eye lid bulging and eye swelling around October of 2018. Reports she lost 40 lbs in October. Reported having body shakes, tremors, and loss of appetite. She underwent thyroid testing with her PCP Dr. Cabrera who started her on Methimazole 10 mg which was increased to 20 mg by her endocrinologist which has improved her symptoms.      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 She usually wears a Rigid gas permeable lens and her visual acuity is 20/70 LEFT eye but she has not been able to wear the lens recently because of the bulging.  History of optic neuritis LEFT eye?  She is not sure. She is a high myope and has some thinning of retinal nerve fiber layer both eyes.      Referring physician: Devendra Morton MD (endocrinologist)    Thyroid history:  Diagnosed when: 10/5/18  BRAR: N/A  Thyroidectomy: N/A    TSI (date): 6.4 (2/13/19)      Previous results:7.4 (1/16/19)    Eye symptoms (since when):   Proptosis (better/worse/same since last visit): Present (Initial visit)  Diplopia(better/worse/same since last visit): None (initial visit)  Eyelid retraction(better/worse/same since last visit): Present (Initial visit)  Tearing(better/worse/same since last visit): Present (Initial visit)  Redness (better/worse/same since last visit): Present (Initial visit)  Pain (better/worse/same since last visit): No (initial visit)  Pain to move the eyes (better/worse/same since last visit): No (initial visit)  Blurred vision: No (initial visit)    Ocular history:   Orbital decompression (date, details): None to date  Strabismus surgery (date, details): None to date  Eyelid surgery (date, details): None to date    Exam:   [place] (base): 22 right 25 left @ 103 Better/worse same: Initial  Strabismus  (better/worse/same): Initial  Eyelid retraction (better/worse/same): Initial         Tonya Rodriguez is a 63 year old female with the following diagnoses:   1. Thyroid eye disease       Obtain orbit CT  Continue methimazole.  Follow up with endocrinologist.  Follow up in 6 months.    Thyroid eye disease (BRIDGET).  The natural history of thyroid eye disease was discussed. We told the patient that typically BRIDGET will worsen for a period of time, then improve to some degree, and then stabilize without normalizing.  This process can take somewhere between 1 and 3 years on average.  In the meantime, we recommended seeing the patient in the [facility] every 6 months (sooner if the patient experiences worsening vision in either eye).  Once the patient becomes stable for at least 6 months' time, we discussed that the patient may need restorative surgery or prisms.  Finally, we discussed that correcting the thyroid hormone levels does not ensure that the eyes will normalize but that it could help to some degree.  Recommend smoking cessation.  Discussed taking selenium 100 mcg per day.               Attending Physician Attestation:  Complete documentation of historical and exam elements from today's encounter can be found in the full encounter summary report (not reduplicated in this progress note).  I personally obtained the chief complaint(s) and history of present illness.  I confirmed and edited as necessary the review of systems, past medical/surgical history, family history, social history, and examination findings as documented by others; and I examined the patient myself.  I personally reviewed the relevant tests, images, and reports as documented above.  I formulated and edited as necessary the assessment and plan and discussed the findings and management plan with the patient and family. - Diego Verma MD  PGY-3 Ophthalmology Resident  [phone]

## 2019-03-05 NOTE — PATIENT INSTRUCTIONS
Thyroid Eye Disease  An Overview  Joel Fair M.D.  Diego Oscar M.D.  Gabo Tucker M.D.     INTRODUCTION  Thyroid eye disease, thyroid orbitopathy and dysthyroid ophthalmopathy are all names which describe a disorder resulting from inflammation of the muscles that move the eyes and the fat within the bony orbit that surrounds the eyes.  Thyroid eye disease may cause the eyes to bulge forward and the lids to become swollen, red and retracted. This disease occurs four times more in females and although it may occur at any age, it is most common in middle aged individuals. It is important to note that 10% of all women will have a thyroid abnormality by the age of 55, and 20% of these individuals will have clinically significant eye changes.     ASSOCIATION WITH THYROID ABNORMALITIES  Thyroid eye disease is typically associated with disorders of the thyroid gland which sits in front of the lower throat. This gland produces thyroxine which is a hormone which affects appetite, metabolism, heart rate and body temperature.  Symptoms associated with high levels of thyroxine, or hyperthyroidism, include weight loss, nervousness, tremors, hair loss, menstrual irregularities and rapid pulse. Low levels, or hypothyroidism, may cause weight gain, depression, fatigue, and cold intolerance. Up to 10% of patients with thyroid abnormalities may develop thyroid eye disease. Sixty percent develop the eye disease within a year of the thyroid problems. Some patients with thyroid eye disease never have any thyroid problem at all. Although thyroid eye disease is associated with thyroid conditions, the two diseases progress and respond to treatment independently.     CAUSE OF THE DISEASE  The cause of thyroid eye disease is still unknown. It is likely due to an immune system attack on the muscle, fat and tear gland surrounding the eye. Other immune disorders include rheumatoid arthritis, lupus, diabetes and myasthenia gravis.  Patients with one immune disorder are often at risk for having others.     SYMPTOMS AND SIGNS     1. EYE PROTRUSION (PROPTOSIS OR EXOPHTHALMOS)      Swelling of the tissues behind the eyes may cause the eyes to protrude.  There are 6 muscles that move the eye. Four of these, the inferior rectus, medial rectus, lateral rectus and superior rectus are most frequently involved. These muscles originate behind the eye at the peak of the eye socket and attach to the eye just behind the cornea. The muscles cannot be seen on the surface of the eye but may become visible as the blood vessels over their anterior portion become prominent with the inflammatory reaction. The immune system singles out the fibroblasts, support cells within the muscles causing the muscles to enlarge.  With muscle enlargement, the eyeball is pushed forward. Frequently one eye is more bulged than the other, but generally both eyes become involved. As the muscles enlarge three things may happen: the eyeball protrudes, the muscles become stiff leading to abnormal movement and the muscles may press on the optic nerve (which transmits visual information from the eye to the brain).     2. EYELID RETRACTION   Inflammation and scarring of the muscles that open the eyelids may leave them abnormally open causing the characteristic  stare . This may also be exaggerated by the protrusion of the eyes in some cases.  In some patients, the eyelids may become so retracted that the eyes don t close completely, even during sleep.     3. DRY EYES AND EXCESSIVE TEARING   Bulging of the eye, eyelid retraction and inflammation of the tear gland may lead to exposure of the eye with evaporation of the protective tear film layer. This leads to dry eye syndrome with foreign body sensation ( sand in the eyes ) aching, burning and occasionally excessive reflex tearing due to the dryness. Severe cases may lead to breakdown in the surface of the cornea (the clear tissue in front  of the iris) with development of ulceration.     4. EYE AND EYELID REDNESS AND SWELLING   Poor venous drainage secondary to the swollen tissues behind the eyes may cause redness and swelling of the eyelids and conjunctiva (the clear membrane that lines the eyeball). In severe cases the swollen conjunctiva may look like a blister or  jelly  hanging over the eyelid. We strongly advise against the use of over-the-counter eyedrops that  get the red out.  These medications can actually make the problem worse and lead to severe problems.     5. GLAUCOMA   Some patients will develop elevated intraocular pressure (pressure inside the eye) which can lead to glaucoma. This is usually not painful, but if undetected and untreated may lead to vision loss.         6. DOUBLE VISION (DIPLOPIA)      Inflammation and scarring of the muscles that move the eyes may lead to poor movement. In mild cases, one might feel a pulling a sensation in the eyes. With more advanced disease, double vision may develop when looking in certain directions - usually up and out.  The inferior rectus muscle underneath the eye is the most frequently affected. When this muscle becomes stiff, the eye cannot look up normally, leading to double vision with one image above the other. In very severe cases the movement of the eyes can be very restricted with obvious misalignment of the eyes and constant double vision.     7. OPTIC NERVE COMPRESSION      Severe swelling of the muscles near the back of the orbit may press on the optic nerve (the cable carrying vision from the eye to the brain).  Early symptoms include dimming of vision and fading of colors.  Permanent visual loss may occur if this process is not recognized and promptly treated.  This severe complication occurs in only 5% of patients with thyroid eye disease and can be reversible if the pressure on the nerve is relieved.     COURSE OF THYROID EYE DISEASE   Thyroid eye disease typically has an  inflammatory phase which lasts 1 to 2 years (range 6 months to 5 years). After the inflammation has subsided, patients are left with a number of structural changes around the eyes that may require treatment.  Recurrences of the active phase occur in about 1% of patients. There is no perfect test to determine when a patient has passed from the  active  phase to  inactive  and instead we rely on your and our ibanez of observation. We assume that if the eyes have not changed for 6 months, then you have entered the  inactive  phase. We also obtain a  blood test  for thyroid stimulating immunoglobulin (TSIG) which gives some evidence of the immune system s reaction against the thyroid and orbital tissues.     It is important to recognize that every patient s course of thyroid eye disease is different and unique.  Some may have minimal signs while others have sudden onset of severe complications such as severe eyelid swelling, bulging of the eyes and vision loss.     We are still unable to determine which complications a particular patient will develop. Patients are therefore followed on a regular basis during the active phase of the disease.  Any new signs (redness, swelling, bulging) or symptoms (double vision, blurring, pain) should be reported immediately in case specific treatment in needed.  A telephone call to your doctor is never  a bother  but is a smart and responsible action.        SMOKING AND STRESS      A clear association between smoking and severity of thyroid eye disease has been demonstrated in many scientific studies, especially for women.  All patients who have thyroid eye disease and smoke should make a strong effort to stop smoking. Stress is also associated with exacerbations of thyroid eye disease.  We are happy to help refer you to the appropriate professionals, if you feel that would be beneficial.     DIAGNOSIS AND INVESTIGATIONS     Thyroid eye disease is diagnosed by the clinical features  described above.  It is confirmed using CT or MRI scans to show the enlarged muscles around the eyes.  Laboratory tests may be used to determine thyroid function (TSH) and inflammatory indicators such as TSIG (described above) may be used to follow the course of your disease.     Other tests may be ordered to document visual function and eye movements.  Photographs will be used to document the appearance of the eyes and  progression over time.     TREATMENT OF ACTIVE PHASE     1. THYROID GLAND TREATMENT   Your endocrinologist may prescribe various medications to suppress or increase your thyroid hormone level.  A radioactive iodine drink or thyroid removal surgery may be offered to destroy part of the gland.  Although these treatments are important for a patient s well-being, they do not appear to influence the course of thyroid eye disease in most patients. Some studies have suggested that radioactive iodine treatment may cause worsening of the eye disease. In our experience, this is rare and may be treated with oral steroid medications if necessary.      2. EYE MEDICATIONS   Mild exposure symptoms, dry eyes and excessive tearing can often be treated with lubricating eye drops (artificial tears) and ointments. Sometimes other medications such as topical steroids or Restasis may be prescribed to treat inflammation of the surface of the eyes. Punctal plugs may be placed into the small opening of your tear drain to keep the tears and medications around longer.     3. LIFESTYLE CHANGES   Swelling changes may be relieved by cutting the amount of salt in your diet and by elevating the head of your bed by placing bricks under the supports at the head of your bed.     4. ANTI-INFLAMMATORY MEDICATIONS   Moderate to severe inflammation of the eyelids and conjunctiva may be treated with corticosteroids (prednisone) or other immune-modulating medications.  Some people respond well to these medications but others do not.   If a person is going to respond, they usually do so rapidly within the first few days.  In cases of optic nerve compression, these medications may be used in combination with radiation and/or surgery.  Because these medications have serious side effects, they are not used for gema periods of time (more than 6-8 weeks) or in cases of mild thyroid eye disease.     5. STEROID INJECTIONS   Due to the serious side effects of oral steroid medicines, we have been injecting small amounts of  these medicines around the eyes in patients with persistent inflammation. The injections are performed in the office and typically only take a few minutes to perform and the benefits last 6-8 weeks. Some patients require multiple injections until the disease burns out.     6. RADIATION THERAPY   Radiation therapy has been shown to reduce inflammation and is offered in cases of thyroid eye disease with moderate to severe soft tissue signs. The treatment is performed by a radiation specialist over a period of 1 to 2 weeks with each treatment lasting 30 to 60 minutes. The effects of the radiation may not be seen for 6-8 weeks after the last treatment. The exact mechanism of action of radiation of the tissues behind the eyes is still unknown, but we have seen excellent results in carefully selected cases. If you are diabetic or pregnant, you should not undergo radiation treatment.     7. ORBITAL DECOMPRESSION   Despite the effectiveness of oral medications, radiation treatment or both, there are some patients who continue to experience loss of vision due to swelling of the muscles which compress the optic nerve. In an effort to salvage the vision in these individuals, orbital decompression surgery is typically performed. In this procedure, bone is removed from the orbit to allow the orbital tissues to relax into the sinuses behind the eyes, taking the pressure off of the optic nerve.  This procedure may be performed by an oculoplastic  surgeon, occasionally with  the help of an ear, nose and throat specialist who specializes in sinus surgery.     TREATMENT IN THE INACTIVE PHASE     After your disease has passed into the inactive phase, there are a number of procedures that are available to help recapture some of your normal eye function and appearance.     1. ORBITAL DECOMPRESSION SURGERY      Orbital decompression surgery is performed to allow the eyes to recess back into the orbital space. The procedure typically involves removing one or more of the bony walls surrounding the eyes as well as some of the excess fat that has developed behind the eyes. The surgery is performed for relief of bulging and will also alleviate the pressure sensation that many patients experience. Surgery takes approximately one hour for each eye and is often staged so that each eye has time to recover. This surgery is typically performed by an ophthalmologist trained in orbital surgery.     2. MUSCLE ALIGNMENT (STRABISMUS) SURGERY      Following orbital decompression surgery, 10-20% of patients will develop new or worsening of their double vision. Strabismus surgery is performed to re-align the eyes to help with this. Often patients will regain single vision in straight ahead gaze but may continue to have double in side or up and down gazes. This surgery is performed under general anesthesia and takes 1-2 hours.  Often the muscle is put on a temporary suture that is permanently sutured in the correct position after the patient wakes up from the general anesthesia.  It is performed by an ophthalmologist trained in complex muscle surgery in kids and adults.     3. EYELID REPOSITIONING SURGERY      Many patients with thyroid eye disease have eyelid malpositions, primarily retraction of the upper and sometimes the lower eyelids. Strabismus surgery will often accentuate these problems. Upper eyelid recession is performed to lengthen and lower the upper eyelid. Often this can  be performed through the inside of the eyelid preventing an external incision and scar. If the eyelid retraction is severe, the procedure is performed through an incision in the natural eyelid crease.  Lower eyelids may be pulled downward in thyroid eye disease. Surgery is typically performed through a small incision made in the outer corner of the eyelids and an internal incision made on the inside of the eyelid. When the lower lid retraction is severe, a spacer graft may be placed on the inside of the eyelid to  splint  the eyelid upward. Surgery is usually performed on an outpatient basis under sedation and typically takes 20-30 minutes per lid and is performed by an ophthalmologist trained in plastic and reconstructive surgery around the eye.     4. COSMETIC EYELID SURGERY      Many patients develop prolapse of fat and excess skin following severe swelling that sometimes accompanies thyroid eye disease. Corrective surgery may be performed to remove some of this excess tissue. This surgery is usually performed on an outpatient basis under sedation and takes approximately one hour. It is performed by an ophthalmologist trained in plastic and reconstructive surgery around the eye     FREQUENTLY ASKED QUESTIONS   The doctors tell me they fixed my thyroid and that is now normal. Why are my eyes acting up?     In Graves  disease the thyroid gland is stimulated by the immune system to produce too much hormone. This excess hormone results in nervousness, palpitations, weight loss and tremors. Treatment is aimed at limiting the gland s ability to produce thyroid hormone.  This may be done with medications, radioactive iodine or surgery.  This does not, however, affect the primary auto-immune process and the immune system may continue to target other tissues including the tissues behind the eyes.  Orbital symptoms may even worsen following thyroid gland treatment.  As discussed, the eye and orbit must be treated  separately.     The steroids make my eyes much more comfortable. Can t I just continue using them?     Steroid therapy is very effective in halting the inflammatioin caused by thyroid eye disease and partially shrinking the muscle tissue. Steroid side effects are very common with continued treatment and can lead to high blood pressure, diabetes, weight gain and bone problems.  If there are continued problems with double vision, exposure of the eye or loss of vision then radiation treatment, steroid injections or surgery should be considered.     Why can t you fix my eyelids now?   Eye muscle surgery on the vertically acting muscles of the eye may cause a change in eyelid position.  Thus, we do not perform eyelid surgery until we have completed all other orbital and eye muscle surgery.      Selenium 100 mcg per day

## 2019-03-05 NOTE — LETTER
3/5/2019         RE:  :  MRN: Tonya Rodriguez  1955  [id number]     Dear Dr. Morton,    Thank you for asking me to see your very pleasant patient, Tonya Rodriguez, in neuro-ophthalmic consultation.  I would like to thank you for sending your records and I have summarized them in the history of present illness.  My assessment and plan are below.  For further details, please see my attached clinic note.           Assessment & Plan     HPI: Tonya Rodriguez is a 63 year old is a female who presents for thyroid eye disease evaluation. She had been following with Dr. Gutierrez for blepharitis and dry eyes. Reports she started noticing eye lid bulging and eye swelling around 2018. Reports she lost 40 lbs in October. Reported having body shakes, tremors, and loss of appetite. She underwent thyroid testing with her PCP Dr. Cabrera who started her on Methimazole 10 mg which was increased to 20 mg by her endocrinologist which has improved her symptoms.      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 She usually wears a Rigid gas permeable lens and her visual acuity is 20/70 LEFT eye but she has not been able to wear the lens recently because of the bulging.  History of optic neuritis LEFT eye?  She is not sure. She is a high myope and has some thinning of retinal nerve fiber layer both eyes.      Referring physician: Devendra Morton MD (endocrinologist)    Thyroid history:  Diagnosed when: 10/5/18  BRAR: N/A  Thyroidectomy: N/A    TSI (date): 6.4 (19)      Previous results:7.4 (19)    Eye symptoms (since when):   Proptosis (better/worse/same since last visit): Present (Initial visit)  Diplopia(better/worse/same since last visit): None (initial visit)  Eyelid retraction(better/worse/same since last visit): Present (Initial visit)  Tearing(better/worse/same since last visit): Present (Initial visit)  Redness (better/worse/same since last visit): Present (Initial visit)  Pain  (better/worse/same since last visit): No (initial visit)  Pain to move the eyes (better/worse/same since last visit): No (initial visit)  Blurred vision: No (initial visit)    Ocular history:   Orbital decompression (date, details): None to date  Strabismus surgery (date, details): None to date  Eyelid surgery (date, details): None to date    Exam:   [place] (base): 22 right 25 left @ 103 Better/worse same: Initial  Strabismus (better/worse/same): Initial  Eyelid retraction (better/worse/same): Initial         Tonya Rodriguez is a 63 year old female with the following diagnoses:   1. Thyroid eye disease       Obtain orbit CT  Continue methimazole.  Follow up with endocrinologist.  Follow up in 6 months.    Thyroid eye disease (BRIDGET).  The natural history of thyroid eye disease was discussed. We told the patient that typically BRIDGET will worsen for a period of time, then improve to some degree, and then stabilize without normalizing.  This process can take somewhere between 1 and 3 years on average.  In the meantime, we recommended seeing the patient in the [facility] 6 months (sooner if the patient experiences worsening vision in either eye).  Once the patient becomes stable for at least 6 months' time, we discussed that the patient may need restorative surgery or prisms.  Finally, we discussed that correcting the thyroid hormone levels does not ensure that the eyes will normalize but that it could help to some degree.  Recommend smoking cessation.  Discussed taking selenium 100 mcg per day.      Again, thank you for allowing me to participate in the care of your patient.      Sincerely,    Diego Oscar MD  Professor  Ophthalmology Residency   Director of Neuro-Ophthalmology  [facility] [facility] Endowed Chair  Departments of Ophthalmology, Neurology, and Neurosurgery  [facility] [address]  [address] [place]  [address]  T - [phone]  F -  [phone]  OLU Conte [email or web address]    CC: Devendra Morton MD  [address] [facility] [address]  VIA In Basket     Keren Anaya MD  [address] [address]  VIA In Basket     Robb Cabrera MD  [address] [facility] [address]  VIA In Basket     Kush Gutierrez MD  [address] [facility] [address]  VIA In Basket       DX = Thyroid eye disease, Graves disease

## 2019-03-12 ENCOUNTER — TELEPHONE (OUTPATIENT)
Dept: NEUROLOGY | Facility: CLINIC | Age: 64
End: 2019-03-12

## 2019-03-12 NOTE — TELEPHONE ENCOUNTER
FUTURE VISIT INFORMATION        FUTURE VISIT INFORMATION:    Date: 03/13/19    Time:  1415    Location: [facility]   REFERRAL INFORMATION:    Referring provider:  Self (previously followed by Dr. Mukherjee)    Referring providers clinic:      Reason for visit/diagnosis:  MS        NOTES (FOR ALL VISITS) STATUS DETAILS   OFFICE NOTE from referring provider     OFFICE NOTE from other specialist Printed  Dr. Mukherjee (neuro) 6/10/05, 2/6/16, 2/27/06, 1/23/07, 3/23/08, 7/18/08, 1/12/10, 8/6/10, 7/12/12, 10/7/13, 10/16/14, 3/27/17  Dr. Bray (neuro) 12/23/08   DISCHARGE SUMMARY from hospital      DISCHARGE REPORT from the ER     OPERATIVE REPORT      MEDICATION LIST In Epic     IMAGING  (FOR ALL VISITS)       EMG      EEG      MRI (HEAD, NECK, SPINE) Printed/ images in [facility]  Brain 11/5/15               OTHER

## 2019-03-13 ENCOUNTER — OFFICE VISIT (OUTPATIENT)
Dept: NEUROLOGY | Facility: CLINIC | Age: 64
End: 2019-03-13
Payer: COMMERCIAL

## 2019-03-13 ENCOUNTER — MYC MEDICAL ADVICE (OUTPATIENT)
Dept: NEUROLOGY | Facility: CLINIC | Age: 64
End: 2019-03-13

## 2019-03-13 VITALS
SYSTOLIC BLOOD PRESSURE: 130 MMHG | TEMPERATURE: 97.3 F | RESPIRATION RATE: 20 BRPM | DIASTOLIC BLOOD PRESSURE: 80 MMHG | HEART RATE: 72 BPM

## 2019-03-13 DIAGNOSIS — E55.9 VITAMIN D DEFICIENCY: ICD-10-CM

## 2019-03-13 DIAGNOSIS — G35 MS (MULTIPLE SCLEROSIS) (H): Primary | ICD-10-CM

## 2019-03-13 PROCEDURE — 99205 OFFICE O/P NEW HI 60 MIN: CPT | Performed by: PSYCHIATRY & NEUROLOGY

## 2019-03-13 PROCEDURE — 36415 COLL VENOUS BLD VENIPUNCTURE: CPT | Performed by: PSYCHIATRY & NEUROLOGY

## 2019-03-13 PROCEDURE — 82306 VITAMIN D 25 HYDROXY: CPT | Performed by: PSYCHIATRY & NEUROLOGY

## 2019-03-13 NOTE — LETTER
3/13/2019         RE: Tonya Rodriguez  [address] [facility] [address] [facility] [address]        Dear Colleague,    Thank you for referring your patient, Tonya Rodriguez, to the [facility]. Please see a copy of my visit note below.    INITIAL NEUROLOGY CONSULTATION    DATE OF VISIT: 3/13/2019  MRN: [id number]  PATIENT NAME: Tonya Rodriguez  YOB: 1955    REFERRING PROVIDER: Referred Self    Chief Complaint   Patient presents with     New Patient     MS.  Self-referral.  Previously followed by Dr. Mukherjee.         SUBJECTIVE:                                                      HPI:   Tonya Rodriguez is a 63 year old female self-referred for Multiple Sclerosis. Per chart review, she has seen multiple neurologists over the years. Most recently, she was seeing Dr. Mukherjee in his [place] office. Per his March 27, 2017 note, the patient was stable. She has some ongoing problems with bladder control for which she is on oxybutynin. He referred to her MS as secondarily-progressive at that point. He wanted her to have a repeat driving evaluation done. It does not appear that she was on any DMTs at the time of that visit.     Looking further back in the chart, she was on Copaxone in the past    The patient says she was diangosed in the [id number]'s in the setting of falls. Notes reflect this too, with positive LP testing for MS around that time.  She is not sure if she has ever had any vision changes. She has experienced numbness in the legs and arms. This resolved for the most part except for some tingling and numbness in the feet. She did have some flares in the early 1990s. She had hallucinations with IV steroids. She says that since she has not had any flares. I note that she was on amantadine in the past.    She says she was on Copaxone for a long time and another injectable prior to that. She had reactions to the latter and then the Copaxone became too expensive. She has not been on oral  DMTs.     She was meant to see Dr. Mukherjee annually. She was supposed to see him a couple of months ago but he got delayed in clinic and she left.    She mentions that she has more recently been diagnosed with Grave's disease. She saw Dr. Amaral for this and she is doing better. She does notice some blurred vision, not entirely new but also a lesion on her RIGHT eyelid that does not itch or hurt but is irritating the eye. This is new. 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 She saw Dr. Oscar earlier this month and was told to call if any concerns arise.      Positional tremor noted in 2006. No concerns about memory today. This has come up in the past.     Additional history: she was seen by Dr. Bray in 2008. His note indicates the diagnosis was made through [facility] in 1995, with symptoms for years prior. She developed Left ON. She had side effects on Betaseron, then started the Copaxone. He mentioned that he felt the disease was secondarily progressive. He gave her clonazepam for PLMS. He was also concerned that the Depakote she was on for mood could be worsening her tremor. They discussed Cytoxan but there was also the concern for history of herpes keratitis. She was going to think about the medication. She followed up with Dr Adair again a couple of years later and had decided against the medication.     Brain MRIs have been stable. Patient is not on Vitamin D.     Past Medical History:   Diagnosis Date     Allergic rhinitis, cause unspecified 6/19/2005    nasonax     Chest pain, unspecified 3/22/04    10/10 initially inseverity - responded to Aspirin and two doses of Nitroglycerin sublingual      Depressive disorder, not elsewhere classified 1/29/2007    on buspar     Herpes simplex with other ophthalmic complications     on valtrex     Lipoma of unspecified site     Lower left suprascapular     Migraine, unspecified, with intractable migraine, so stated, without mention of status migrainosus 1/20/2007    midrin -  about 30 pill a year     Multiple sclerosis (H) dx in 1980's     Personal history of other disorder of urinary system     self caths - recurrent UTIs     Past Surgical History:   Procedure Laterality Date     APPENDECTOMY  1980's    Retained suture     ARTHROPLASTY HIP  9/19/2012    Procedure: ARTHROPLASTY HIP;  Right Total Hip Minimally Invasive Surgery;  Surgeon: Devendra Douglas MD;  Location: [facility]     ARTHROSCOPY KNEE RT/LT  1989    Arthroscopy of the Left Knee     BUNIONECTOMY RT/LT      Bilateral bunionectomies x4 surgeries     EXCISE LESION TRUNK N/A 4/17/2015    Procedure: EXCISE LESION TRUNK;  Surgeon: Wander Gutierrez MD;  Location: [facility] OR     SURGICAL HISTORY OF -   1974, 1977    two normal deliveries     TUBAL LIGATION  1981-82         Current Outpatient Medications on File Prior to Visit:  gabapentin (NEURONTIN) 100 MG capsule Take 2 capsules (200 mg) by mouth At Bedtime   imipramine (TOFRANIL) 50 MG tablet Take 1 tablet (50 mg) by mouth At Bedtime   methimazole (TAPAZOLE) 10 MG tablet Take 2-tablets by mouth daily as directed   metoprolol succinate ER (TOPROL-XL) 25 MG 24 hr tablet Take 1 tablet (25 mg) by mouth daily   oxybutynin ER (DITROPAN XL) 15 MG 24 hr tablet Take 1 tablet (15 mg) by mouth 2 times daily   traZODone (DESYREL) 50 MG tablet Take 1 tablet (50 mg) by mouth At Bedtime   valACYclovir (VALTREX) 500 MG tablet Take 1 tablet (500 mg) by mouth daily   ORDER FOR DME Equipment being ordered: urinary catheter 6 times daily   simvastatin (ZOCOR) 20 MG tablet Take 1 tablet (20 mg) by mouth At Bedtime (Patient not taking: Reported on 3/13/2019)   [DISCONTINUED] oxybutynin (DITROPAN-XL) 10 MG 24 hr tablet Take 3 tablets (30 mg) by mouth At Bedtime     No current facility-administered medications on file prior to visit.   Allergies   Allergen Reactions     Cipro [Ciprofloxacin] Rash     gets yeast infections - BAD with.     Lactulose GI Disturbance     Caused Vomiting        Problem (# of  "Occurrences) Relation (Name,Age of Onset)    Alcohol/Drug (1) Brother (ed)    Cancer (2) Father: lung, Sister: cervical    Coronary Artery Disease (1) Brother José Manuel): injury \" maker\" tree fell on him    Diabetes (1) Father    Genitourinary Problems (1) Brother (Vasu): stones    Heart Disease (4) Mother, Father, Maternal Grandfather, Son: mummer    Hypertension (1) Mother    Migraines (1) Mother       Negative family history of: Glaucoma, Macular Degeneration, Aneurysm, Brain Hemorrhage, Seizure Disorder, Cerebrovascular Disease, Depression        Social History     Tobacco Use     Smoking status: Current Every Day Smoker     Packs/day: 1.00     Years: 45.00     Pack years: 45.00     Types: Cigarettes     Smokeless tobacco: Never Used   Substance Use Topics     Alcohol use: No     Drug use: No       REVIEW OF SYSTEMS:                                                      10-point review of systems otherwise negative except as mentioned above in HPI.   EXAM:                                                      Physical Exam:   Vitals: /80 (BP Location: Right arm, Patient Position: Sitting, Cuff Size: Adult Regular)   Pulse 72   Temp 97.3  F (36.3  C) (Tympanic)   Resp 20   BMI= There is no height or weight on file to calculate BMI.  GENERAL: NAD.   HEENT: Exophthalmos. Left lens is clouded peripherally.  CV: RRR. S1, S2.   NECK: No bruits.  Neurologic:  MENTAL STATUS: Alert, attentive. Speech is slightly dysarthric. Normal comprehension. Mini-co/5 (missed one word on recall). Normal concentration. Adequate fund of knowledge.   CRANIAL NERVES: Discs technically difficult to visualize due to eye movement.. Visual fields intact to confrontation. Left pupil is slightly smaller than the Right, reactive. Facial sensation and movement normal. EOM appear full, but skewed by exophthalmos. Hearing intact to conversation. Trapezius strength intact. Palate moves symmetrically. Tongue midline.  MOTOR: 5/5 in " proximal and distal muscle groups of upper and lower extremities. Tone and bulk normal.   DTRs: Brisk throughout.  Babinski equivocal. Babinski down going.   SENSATION: Normal light touch and pinprick. Intact proprioception. Vibration: Diminished at both ankles.   COORDINATION: Slow finger nose finger and finger tapping. Knee heel shin normal.  STATION AND GAIT: Romberg positive. Gait is cautious.   Tremor: Axial and hands, variable amplitude.    Relevant Data:  MRI Brain (11.5.15):  IMPRESSION:   1. Diffuse cerebral volume loss and cerebral white matter changes  consistent with the patient's history of multiple sclerosis. No new  lesion since the comparison study. No contrast enhancement in any of  the existing lesions to suggest any areas of active demyelination.  2. Otherwise, normal brain MRI. No evidence for acute intracranial  pathology.     Imaging reviewed by me. Agree with radiology read.     ASSESSMENT and PLAN:                                                      Assessment and Plan:      ICD-10-CM    1. MS (multiple sclerosis) (H) G35 MR Brain w/o & w Contrast     MR Cervical Spine w/o & w Contrast   2. Vitamin D deficiency E55.9 Vitamin D Deficiency        Ms. Rodriguez is a pleasant 64 yo with Graves Disease here to establish care for secondarily-progressive MS. Most recent imaging and patient's subjective symptoms have been stable over the past few years despite no disease-modifying treatments. I would like to repeat a brain and add a cervical MRI. We will not make any medication changes for now, as per patient preference. We may want to revisit an immune modulator in the future if the imaging shows disease progression. I will see her back in 6 months. We will check her vitamin D level today. The patient understands and agrees with the plan.     Patient Instructions:  -- Labs today: Vitamin D level.   -- No new medications for the MS today.   -- I do recommend repeat brain MRI and cervical MRI (ok to  wait until the weather improves). We will notify you of the results.   -- Give Dr. Oscar a call about the eye irritation, blurriness. You do have a small lesion on your Right lower eyelid that looks like it could be a stye, but does not appear infected.   -- Return to Neurology clinic in 6 months.     Total Time: 60 minutes were spent with the patient and in chart review. More than 50% of the time spent on counseling (as described above in Assessment and Plan/Instructions) /coordinating the care.    Marci Ward MD  Neurology    CC: Robb Cabrera MD    Again, thank you for allowing me to participate in the care of your patient.        Sincerely,        Marci Ward MD

## 2019-03-13 NOTE — PATIENT INSTRUCTIONS
Plan:    -- Labs today: Vitamin D level.   -- No new medications for the MS today.   -- I do recommend repeat brain MRI and cervical MRI (ok to wait until the weather improves). We will notify you of the results.   -- Give Dr. Oscar a call about the eye irritation, blurriness. You do have a small lesion on your Right lower eyelid that looks like it could be a stye, but does not appear infected.   -- Return to Neurology clinic in 6 months.

## 2019-03-14 LAB — DEPRECATED CALCIDIOL+CALCIFEROL SERPL-MC: 22 UG/L (ref 20–75)

## 2019-03-18 NOTE — RESULT ENCOUNTER NOTE
Please advise Tonya Rodriguez,  1955, that her vitamin D level is lower than desired (goal is around 50). I recommend she start a D3 supplement, at least 2000 units daily.   [phone] (home)   Marci Ward

## 2019-04-04 ENCOUNTER — HOSPITAL ENCOUNTER (OUTPATIENT)
Dept: MRI IMAGING | Facility: CLINIC | Age: 64
End: 2019-04-04
Attending: PSYCHIATRY & NEUROLOGY
Payer: COMMERCIAL

## 2019-04-04 ENCOUNTER — HOSPITAL ENCOUNTER (OUTPATIENT)
Dept: MRI IMAGING | Facility: CLINIC | Age: 64
Discharge: HOME | End: 2019-04-04
Attending: PSYCHIATRY & NEUROLOGY | Admitting: PSYCHIATRY & NEUROLOGY
Payer: COMMERCIAL

## 2019-04-04 DIAGNOSIS — G35 MS (MULTIPLE SCLEROSIS) (H): ICD-10-CM

## 2019-04-04 PROCEDURE — 25500064 ZZH RX 255 OP 636: Performed by: PSYCHIATRY & NEUROLOGY

## 2019-04-04 PROCEDURE — 70553 MRI BRAIN STEM W/O & W/DYE: CPT

## 2019-04-04 PROCEDURE — A9585 GADOBUTROL INJECTION: HCPCS | Performed by: PSYCHIATRY & NEUROLOGY

## 2019-04-04 PROCEDURE — 72156 MRI NECK SPINE W/O & W/DYE: CPT

## 2019-04-04 RX ORDER — GADOBUTROL 604.72 MG/ML
6 INJECTION INTRAVENOUS ONCE
Status: COMPLETED | OUTPATIENT
Start: 2019-04-04 | End: 2019-04-04

## 2019-04-04 RX ADMIN — GADOBUTROL 6 ML: 604.72 INJECTION INTRAVENOUS at 15:11

## 2019-04-05 NOTE — RESULT ENCOUNTER NOTE
Please advise Tonya DYLAN Rodriguez,  1955, that her MS lesions are stable on the MRIs. She does have some degenerative disc disease in the cervical spine, and there is notation of some thoracic cord lesions that I think we should do an additional image of, if she is willing. For the degenerative changes, would she like to see ortho? I can refer. [phone] (home)   Marci Ward

## 2019-04-12 ENCOUNTER — APPOINTMENT (OUTPATIENT)
Dept: GENERAL RADIOLOGY | Facility: CLINIC | Age: 64
End: 2019-04-12
Attending: FAMILY MEDICINE
Payer: COMMERCIAL

## 2019-04-12 ENCOUNTER — HOSPITAL ENCOUNTER (EMERGENCY)
Facility: CLINIC | Age: 64
Discharge: HOME | End: 2019-04-12
Attending: FAMILY MEDICINE | Admitting: FAMILY MEDICINE
Payer: COMMERCIAL

## 2019-04-12 VITALS
TEMPERATURE: 97.6 F | WEIGHT: 140 LBS | SYSTOLIC BLOOD PRESSURE: 119 MMHG | RESPIRATION RATE: 20 BRPM | BODY MASS INDEX: 20.38 KG/M2 | OXYGEN SATURATION: 100 % | DIASTOLIC BLOOD PRESSURE: 79 MMHG

## 2019-04-12 DIAGNOSIS — K59.01 SLOW TRANSIT CONSTIPATION: ICD-10-CM

## 2019-04-12 DIAGNOSIS — N93.9 VAGINAL BLEEDING: ICD-10-CM

## 2019-04-12 LAB
ALBUMIN SERPL-MCNC: 3.7 G/DL (ref 3.4–5)
ALBUMIN UR-MCNC: NEGATIVE MG/DL
ALP SERPL-CCNC: 200 U/L (ref 40–150)
ALT SERPL W P-5'-P-CCNC: 14 U/L (ref 0–50)
AMORPH CRY #/AREA URNS HPF: ABNORMAL /HPF
ANION GAP SERPL CALCULATED.3IONS-SCNC: 2 MMOL/L (ref 3–14)
APPEARANCE UR: ABNORMAL
AST SERPL W P-5'-P-CCNC: 10 U/L (ref 0–45)
BACTERIA #/AREA URNS HPF: ABNORMAL /HPF
BASOPHILS # BLD AUTO: 0.1 10E9/L (ref 0–0.2)
BASOPHILS NFR BLD AUTO: 0.7 %
BILIRUB SERPL-MCNC: 0.4 MG/DL (ref 0.2–1.3)
BILIRUB UR QL STRIP: NEGATIVE
BUN SERPL-MCNC: 19 MG/DL (ref 7–30)
CALCIUM SERPL-MCNC: 8.8 MG/DL (ref 8.5–10.1)
CHLORIDE SERPL-SCNC: 106 MMOL/L (ref 94–109)
CO2 SERPL-SCNC: 31 MMOL/L (ref 20–32)
COLOR UR AUTO: ABNORMAL
CREAT SERPL-MCNC: 0.7 MG/DL (ref 0.52–1.04)
DIFFERENTIAL METHOD BLD: NORMAL
EOSINOPHIL # BLD AUTO: 0.1 10E9/L (ref 0–0.7)
EOSINOPHIL NFR BLD AUTO: 1 %
ERYTHROCYTE [DISTWIDTH] IN BLOOD BY AUTOMATED COUNT: 13.5 % (ref 10–15)
GFR SERPL CREATININE-BSD FRML MDRD: >90 ML/MIN/{1.73_M2}
GLUCOSE SERPL-MCNC: 120 MG/DL (ref 70–99)
GLUCOSE UR STRIP-MCNC: NEGATIVE MG/DL
HCT VFR BLD AUTO: 44.1 % (ref 35–47)
HGB BLD-MCNC: 14.1 G/DL (ref 11.7–15.7)
HGB UR QL STRIP: NEGATIVE
IMM GRANULOCYTES # BLD: 0 10E9/L (ref 0–0.4)
IMM GRANULOCYTES NFR BLD: 0.3 %
KETONES UR STRIP-MCNC: 5 MG/DL
LEUKOCYTE ESTERASE UR QL STRIP: ABNORMAL
LYMPHOCYTES # BLD AUTO: 2.9 10E9/L (ref 0.8–5.3)
LYMPHOCYTES NFR BLD AUTO: 26.9 %
MCH RBC QN AUTO: 28.3 PG (ref 26.5–33)
MCHC RBC AUTO-ENTMCNC: 32 G/DL (ref 31.5–36.5)
MCV RBC AUTO: 88 FL (ref 78–100)
MONOCYTES # BLD AUTO: 0.5 10E9/L (ref 0–1.3)
MONOCYTES NFR BLD AUTO: 4.8 %
MUCOUS THREADS #/AREA URNS LPF: PRESENT /LPF
NEUTROPHILS # BLD AUTO: 7.1 10E9/L (ref 1.6–8.3)
NEUTROPHILS NFR BLD AUTO: 66.3 %
NITRATE UR QL: POSITIVE
NRBC # BLD AUTO: 0 10*3/UL
NRBC BLD AUTO-RTO: 0 /100
PH UR STRIP: 6 PH (ref 5–7)
PLATELET # BLD AUTO: 316 10E9/L (ref 150–450)
POTASSIUM SERPL-SCNC: 3.7 MMOL/L (ref 3.4–5.3)
PROT SERPL-MCNC: 7.3 G/DL (ref 6.8–8.8)
RBC # BLD AUTO: 4.99 10E12/L (ref 3.8–5.2)
RBC #/AREA URNS AUTO: 4 /HPF (ref 0–2)
SODIUM SERPL-SCNC: 139 MMOL/L (ref 133–144)
SOURCE: ABNORMAL
SP GR UR STRIP: 1.02 (ref 1–1.03)
SQUAMOUS #/AREA URNS AUTO: <1 /HPF (ref 0–1)
UROBILINOGEN UR STRIP-MCNC: 4 MG/DL (ref 0–2)
WBC # BLD AUTO: 10.7 10E9/L (ref 4–11)
WBC #/AREA URNS AUTO: 30 /HPF (ref 0–5)

## 2019-04-12 PROCEDURE — 81003 URINALYSIS AUTO W/O SCOPE: CPT | Performed by: FAMILY MEDICINE

## 2019-04-12 PROCEDURE — 99284 EMERGENCY DEPT VISIT MOD MDM: CPT | Mod: Z6 | Performed by: FAMILY MEDICINE

## 2019-04-12 PROCEDURE — 99284 EMERGENCY DEPT VISIT MOD MDM: CPT | Performed by: FAMILY MEDICINE

## 2019-04-12 PROCEDURE — 80053 COMPREHEN METABOLIC PANEL: CPT | Performed by: FAMILY MEDICINE

## 2019-04-12 PROCEDURE — 85025 COMPLETE CBC W/AUTO DIFF WBC: CPT | Performed by: FAMILY MEDICINE

## 2019-04-12 PROCEDURE — 99284 EMERGENCY DEPT VISIT MOD MDM: CPT | Mod: 25 | Performed by: FAMILY MEDICINE

## 2019-04-12 PROCEDURE — 74019 RADEX ABDOMEN 2 VIEWS: CPT

## 2019-04-12 ASSESSMENT — ENCOUNTER SYMPTOMS
ABDOMINAL DISTENTION: 1
DIAPHORESIS: 0
CONSTIPATION: 0
DIFFICULTY URINATING: 1
COUGH: 0
SHORTNESS OF BREATH: 0
NAUSEA: 0
DIARRHEA: 0
CHILLS: 0
ABDOMINAL PAIN: 1
FEVER: 0
VOMITING: 0

## 2019-04-12 NOTE — ED NOTES
Pt presents to ED with complaints of not feeling well for a while. Today she is having increased abdominal pain (cramping) and almost black (dark) colored vaginal discharge. Recently diagnosed with graves disease. No change in bowel or bladder (pt self caths, no change). Some chills, no fevers.

## 2019-04-12 NOTE — ED AVS SNAPSHOT
[facility]  [address] [facility] [address]  Phone:  [phone]  Fax:  [phone]                                    Tonya Rodriguez   MRN: [id number]    Department:  [facility]   Date of Visit:  4/12/2019           After Visit Summary Signature Page    I have received my discharge instructions, and my questions have been answered. I have discussed any challenges I see with this plan with the nurse or doctor.    ..........................................................................................................................................  Patient/Patient Representative Signature      ..........................................................................................................................................  Patient Representative Print Name and Relationship to Patient    ..................................................               ................................................  Date                                   Time    ..........................................................................................................................................  Reviewed by Signature/Title    ...................................................              ..............................................  Date                                               Time          22EPIC Rev 08/18

## 2019-04-13 NOTE — ED PROVIDER NOTES
History     Chief Complaint   Patient presents with     Abdominal Pain     cramping, dark discharge     HPI  Tonya Rodriguez is a 63 year old female who presents with right lower quadrant abdominal pain.  She states this is been bothering her for about 12 hours.  She notes that it comes and goes.  It does not seem to be provoked by anything nor does it seem to be relieved by anything.  She has had no associated nausea or vomiting.  No associated fever.  She denies any dysuria.  No bowel changes.    She has history of multiple sclerosis and self caths for that.  She has not noticed any changes in the urine.    She is status post appendectomy many years ago.  She has had no other abdominal surgery.    Allergies:  Allergies   Allergen Reactions     Cipro [Ciprofloxacin] Rash     gets yeast infections - BAD with.     Lactulose GI Disturbance     Caused Vomiting       Problem List:    Patient Active Problem List    Diagnosis Date Noted     Insomnia, unspecified type 01/16/2019     Priority: Medium     Self-catheterizes urinary bladder 03/05/2015     Priority: Medium     Diagnosis updated by automated process. Provider to review and confirm.       Speech dysfluency 02/08/2015     Priority: Medium     Family history of rheumatoid arthritis 02/05/2015     Priority: Medium     Urinary incontinence 01/15/2014     Priority: Medium     Restless leg 01/15/2014     Priority: Medium     Screening for breast cancer 01/15/2014     Priority: Medium     Herpes keratitis 01/15/2014     Priority: Medium     Advanced directives, counseling/discussion 01/15/2014     Priority: Medium     Major depression in complete remission (H) 09/16/2013     Priority: Medium     Status post THR (total hip replacement) 09/21/2012     Priority: Medium     S/P revision of total hip  RIGHT 09/19/2012     Priority: Medium     Osteoarthritis of hip 09/19/2012     Priority: Medium     Do you wish to do the replacement in the background? yes          Hyperlipidemia with target LDL less than 130 08/19/2010     Priority: Medium     Diagnosis updated by automated process. Provider to review and confirm.       Dysphagia 06/16/2009     Priority: Medium     Apparently neuromuscular -related to MS       Periodic limb movement sleep disorder 06/16/2009     Priority: Medium     Clonazepam---prescribed by neurology       Herpes simplex with other ophthalmic complications      Priority: Medium     on valtrex       Personal history of other disorder of urinary system      Priority: Medium     self caths - recurrent UTIs       Intractable migraine 01/20/2007     Priority: Medium     Problem list name updated by automated process. Provider to review       Esophageal reflux 10/11/2006     Priority: Medium     Allergic rhinitis 06/19/2005     Priority: Medium     Problem list name updated by automated process. Provider to review       Other chronic allergic conjunctivitis 06/19/2005     Priority: Medium     Multiple sclerosis (H) 06/10/2005     Priority: Medium        Past Medical History:    Past Medical History:   Diagnosis Date     Allergic rhinitis, cause unspecified 6/19/2005     Chest pain, unspecified 3/22/04     Depressive disorder, not elsewhere classified 1/29/2007     Herpes simplex with other ophthalmic complications      Lipoma of unspecified site      Migraine, unspecified, with intractable migraine, so stated, without mention of status migrainosus 1/20/2007     Multiple sclerosis (H) dx in 1980's     Personal history of other disorder of urinary system        Past Surgical History:    Past Surgical History:   Procedure Laterality Date     APPENDECTOMY  1980's    Retained suture     ARTHROPLASTY HIP  9/19/2012    Procedure: ARTHROPLASTY HIP;  Right Total Hip Minimally Invasive Surgery;  Surgeon: Devendra Douglas MD;  Location: [facility] OR     ARTHROSCOPY KNEE RT/LT  1989    Arthroscopy of the Left Knee     BUNIONECTOMY RT/LT      Bilateral bunionectomies x4 surgeries  "    EXCISE LESION TRUNK N/A 4/17/2015    Procedure: EXCISE LESION TRUNK;  Surgeon: Wander Gutierrez MD;  Location: [facility] OR     SURGICAL HISTORY OF -   1974, 1977    two normal deliveries     TUBAL LIGATION  1981-82       Family History:    Family History   Problem Relation Age of Onset     Hypertension Mother      Heart Disease Mother      Migraines Mother      Cancer Father         lung     Heart Disease Father      Diabetes Father      Heart Disease Maternal Grandfather      Cancer Sister         cervical     Heart Disease Son         mummer     Alcohol/Drug Brother      Genitourinary Problems Brother         stones     Coronary Artery Disease Brother         injury \" maker\" tree fell on him     Glaucoma No family hx of      Macular Degeneration No family hx of      Aneurysm No family hx of      Brain Hemorrhage No family hx of      Seizure Disorder No family hx of      Cerebrovascular Disease No family hx of      Depression No family hx of        Social History:  Marital Status:   [2]  Social History     Tobacco Use     Smoking status: Current Every Day Smoker     Packs/day: 1.00     Years: 45.00     Pack years: 45.00     Types: Cigarettes     Smokeless tobacco: Never Used   Substance Use Topics     Alcohol use: No     Drug use: No        Medications:      gabapentin (NEURONTIN) 100 MG capsule   imipramine (TOFRANIL) 50 MG tablet   methimazole (TAPAZOLE) 10 MG tablet   methimazole (TAPAZOLE) 10 MG tablet   metoprolol succinate ER (TOPROL-XL) 25 MG 24 hr tablet   ORDER FOR DME   oxybutynin ER (DITROPAN XL) 15 MG 24 hr tablet   simvastatin (ZOCOR) 20 MG tablet   traZODone (DESYREL) 50 MG tablet   valACYclovir (VALTREX) 500 MG tablet         Review of Systems   Constitutional: Negative for chills, diaphoresis and fever.   Respiratory: Negative for cough and shortness of breath.    Gastrointestinal: Positive for abdominal distention and abdominal pain. Negative for constipation, diarrhea, nausea and " vomiting.   Genitourinary: Positive for difficulty urinating.       Physical Exam   BP: 119/79  Heart Rate: 78  Temp: 97.6  F (36.4  C)  Resp: 20  Weight: 63.5 kg (140 lb)  SpO2: 100 %      Physical Exam   Constitutional: She appears well-developed and well-nourished. No distress.   HENT:   Head: Normocephalic and atraumatic.   Eyes: EOM are normal.   exophthalmos   Neck: Normal range of motion. Neck supple.   Cardiovascular: Normal rate and regular rhythm.   No murmur heard.  Pulmonary/Chest: Effort normal and breath sounds normal.   Abdominal: Soft. Bowel sounds are normal. She exhibits distension. There is tenderness.   Lymphadenopathy:     She has no cervical adenopathy.   Neurological: She is alert.   Skin: Skin is warm and dry.   Psychiatric: She has a normal mood and affect.       ED Course        Procedures               Critical Care time:  none               Results for orders placed or performed during the hospital encounter of 04/12/19 (from the past 24 hour(s))   CBC with platelets differential   Result Value Ref Range    WBC 10.7 4.0 - 11.0 10e9/L    RBC Count 4.99 3.8 - 5.2 10e12/L    Hemoglobin 14.1 11.7 - 15.7 g/dL    Hematocrit 44.1 35.0 - 47.0 %    MCV 88 78 - 100 fl    MCH 28.3 26.5 - 33.0 pg    MCHC 32.0 31.5 - 36.5 g/dL    RDW 13.5 10.0 - 15.0 %    Platelet Count 316 150 - 450 10e9/L    Diff Method Automated Method     % Neutrophils 66.3 %    % Lymphocytes 26.9 %    % Monocytes 4.8 %    % Eosinophils 1.0 %    % Basophils 0.7 %    % Immature Granulocytes 0.3 %    Nucleated RBCs 0 0 /100    Absolute Neutrophil 7.1 1.6 - 8.3 10e9/L    Absolute Lymphocytes 2.9 0.8 - 5.3 10e9/L    Absolute Monocytes 0.5 0.0 - 1.3 10e9/L    Absolute Eosinophils 0.1 0.0 - 0.7 10e9/L    Absolute Basophils 0.1 0.0 - 0.2 10e9/L    Abs Immature Granulocytes 0.0 0 - 0.4 10e9/L    Absolute Nucleated RBC 0.0    Comprehensive metabolic panel   Result Value Ref Range    Sodium 139 133 - 144 mmol/L    Potassium 3.7 3.4 - 5.3  mmol/L    Chloride 106 94 - 109 mmol/L    Carbon Dioxide 31 20 - 32 mmol/L    Anion Gap 2 (L) 3 - 14 mmol/L    Glucose 120 (H) 70 - 99 mg/dL    Urea Nitrogen 19 7 - 30 mg/dL    Creatinine 0.70 0.52 - 1.04 mg/dL    GFR Estimate >90 >60 mL/min/[1.73_m2]    GFR Estimate If Black >90 >60 mL/min/[1.73_m2]    Calcium 8.8 8.5 - 10.1 mg/dL    Bilirubin Total 0.4 0.2 - 1.3 mg/dL    Albumin 3.7 3.4 - 5.0 g/dL    Protein Total 7.3 6.8 - 8.8 g/dL    Alkaline Phosphatase 200 (H) 40 - 150 U/L    ALT 14 0 - 50 U/L    AST 10 0 - 45 U/L   XR Abdomen 2 Views    Narrative    ABDOMEN TWO VIEWS    4/12/2019 8:02 PM     HISTORY: Right lower quadrant pain status post appendectomy years ago.    COMPARISON: None.      Impression    IMPRESSION: Prominent gas and stool within the colon. Correlate with  constipation. No evidence for small bowel obstruction. Clear lung  bases. No free air.    JANNA HOLCOMB MD       Medications - No data to display    Assessments & Plan (with Medical Decision Making)     I have reviewed the nursing notes.    I have reviewed the findings, diagnosis, plan and need for follow up with the patient.   The patient presents with right lower quadrant abdominal pain.  She has significant constipation on her x-ray.  I discussed this with the patient and her .  She will try MiraLAX and a stool softener this weekend.  If not improving she will follow-up next week.    She also noted that she had some intermittent vaginal spotting for an undefined amount of time.  I will refer her to GYN at [facility] to have this evaluated by specialty.       Medication List      There are no discharge medications for this visit.         Final diagnoses:   Slow transit constipation   Vaginal bleeding       4/12/2019   [facility] EMERGENCY DEPARTMENT     Reji Villegas MD  04/12/19 2032

## 2019-04-13 NOTE — DISCHARGE INSTRUCTIONS
Try Miralax and a stool softener for your constipation.  See GYN at [place] for evaluation of bleeding.

## 2019-04-30 ENCOUNTER — TELEPHONE (OUTPATIENT)
Dept: OBGYN | Facility: CLINIC | Age: 64
End: 2019-04-30

## 2019-04-30 DIAGNOSIS — N95.0 POST-MENOPAUSAL BLEEDING: Primary | ICD-10-CM

## 2019-04-30 NOTE — TELEPHONE ENCOUNTER
Left message for patient to return call regarding her upcoming appointment May 2, 2019 with Dr. Moise. We placed a future order for a Pelvic Ultrasound. Please inform patient order was placed and see if she can try to schedule the ultrasound prior to her appointment.    Swetha Rodriguez, [facility]

## 2019-04-30 NOTE — TELEPHONE ENCOUNTER
Pt called back - informed of message below.  Transferred pt to diagnostics to schedule.    -Christine Mosqueda  Clinic Station

## 2019-05-01 ENCOUNTER — HOSPITAL ENCOUNTER (OUTPATIENT)
Dept: ULTRASOUND IMAGING | Facility: CLINIC | Age: 64
Discharge: HOME | End: 2019-05-01
Attending: OBSTETRICS & GYNECOLOGY | Admitting: OBSTETRICS & GYNECOLOGY
Payer: COMMERCIAL

## 2019-05-01 DIAGNOSIS — N95.0 POST-MENOPAUSAL BLEEDING: ICD-10-CM

## 2019-05-01 PROCEDURE — 76830 TRANSVAGINAL US NON-OB: CPT

## 2019-05-01 NOTE — PROGRESS NOTES
Tonya is a 63 year old  here for consultation at the request of Robb Cabrera for postmenopausal bleeding.  Seen in ED on 19 for stomach cramps which is thought to be related to her constipation.  At the time, she also mentioned that she had been having intermittent spotting/bleeding  For the past few months.  She has had no bleeding in the past week.  Menopause occurred ~ 20 years ago.      U/S:  FINDINGS: The uterus measures 6.0 x 2.6 x 3.7cm. There is a small  amount of fluid in the endometrium. This is surrounding a polypoid  structure that measures 0.3 x 1.4 cm. Single wall endometrial  thickness elsewhere is 0.1 cm. An intramural fibroid in the posterior  mid uterine segment measures 1.4 cm. A subserosal/intramural fibroid  in the fundal region measures 2.2 cm. Both ovaries are of normal size.  There is no free pelvic fluid.                                                                      IMPRESSION: There is a polypoid structure in the endometrium which is  consistent with a polyp. Focal developing malignancy in this  postmenopausal female is not completely excluded.    ROS: Ten point review of systems was reviewed and negative except the above.    Past Medical History:   Diagnosis Date     Chest pain, unspecified 3/22/04    10/10 initially inseverity - responded to Aspirin and two doses of Nitroglycerin sublingual      Herpes simplex with other ophthalmic complications     on valtrex     Lipoma of unspecified site     Lower left suprascapular     Migraine, unspecified, with intractable migraine, so stated, without mention of status migrainosus 2007    midrin - about 30 pill a year     Past Surgical History:   Procedure Laterality Date     APPENDECTOMY      Retained suture     ARTHROPLASTY HIP  2012    Procedure: ARTHROPLASTY HIP;  Right Total Hip Minimally Invasive Surgery;  Surgeon: Devendra Douglas MD;  Location: [facility] OR     ARTHROSCOPY KNEE RT/LT      Arthroscopy of  "the Left Knee     BUNIONECTOMY RT/LT      Bilateral bunionectomies x4 surgeries     EXCISE LESION TRUNK N/A 4/17/2015    Procedure: EXCISE LESION TRUNK;  Surgeon: Wander Gutierrez MD;  Location: [facility] OR     SURGICAL HISTORY OF -   1974, 1977    two normal deliveries     TUBAL LIGATION  1981-82     Patient Active Problem List   Diagnosis     Multiple sclerosis (H)     Allergic rhinitis     Other chronic allergic conjunctivitis     Dysphagia     Hyperlipidemia with target LDL less than 130     S/P revision of total hip  RIGHT     Osteoarthritis of hip     Major depression in complete remission (H)     Urinary incontinence     Restless leg     Herpes keratitis     Advanced directives, counseling/discussion     Family history of rheumatoid arthritis     Speech dysfluency     Self-catheterizes urinary bladder     Insomnia, unspecified type     Graves disease     Smoker       ALL/Meds: Her medication and allergy histories were reviewed and are documented in their appropriate chart areas.    Social History     Tobacco Use     Smoking status: Current Every Day Smoker     Packs/day: 1.00     Years: 45.00     Pack years: 45.00     Types: Cigarettes     Smokeless tobacco: Never Used   Substance Use Topics     Alcohol use: No     Drug use: No       FH: Her family history was reviewed and documented in its appropriate chart area.    PE: /78 (BP Location: Right arm, Patient Position: Chair, Cuff Size: Adult Small)   Pulse 97   Temp 98.3  F (36.8  C) (Tympanic)   Resp 16   Ht 1.765 m (5' 9.5\")   Wt 60.8 kg (134 lb)   Breastfeeding? No   BMI 19.50 kg/m    Body mass index is 19.5 kg/m .    General Appearance:  healthy, alert, active, no distress  HEENT: NCAT  Abdomen: Soft, nontender.  Normal bowel sounds.  No masses  Pelvic:       - Ext: Normal external genitalia       - Urethra: no lesions, no masses, no hypermobility       - Urethral Meatus: normal appearance       - Bladder: no tenderness, no masses       - " Vagina: mild atrophy, no lesions, minimal discharge       - Cervix: no lesions, stenotic os    Pap taken.  Cervix prepped with betadine and grasped with a tenaculum.  Multiple attempts made to open the os, but to no avail    A/P     ICD-10-CM    1. Postmenopausal bleeding N95.0 Jessica-Operative Worksheet     misoprostol (CYTOTEC) 200 MCG tablet   2. Pap smear for cervical cancer screening Z12.4 Pap imaged thin layer screen with HPV - recommended age 30 - 65 years (select HPV order below)     HPV High Risk Types DNA Cervical   3. Cervical stenosis (uterine cervix) N88.2 Jessica-Operative Worksheet     misoprostol (CYTOTEC) 200 MCG tablet       1. Discussed potential etiologies for the postmenopausal spotting.  Discussed importance of endometrial sampling and/or removal of the polypoid structure.  Given her stenosis, we discussed option to trying misoprostol and reattempting sampling in office versus proceed with D&C hysteroscopy with preoperative misoprostol.  Patient most amenable to D&C hysteroscopy.    Lauren Moise M.D.    30 minutes was spent face to face with the patient today, not including time spent on procedure, discussing her history, diagnosis, and follow-up plan as noted above.  Over 50% of the visit was spent in counseling and coordination of care.

## 2019-05-02 ENCOUNTER — TELEPHONE (OUTPATIENT)
Dept: NEUROLOGY | Facility: CLINIC | Age: 64
End: 2019-05-02

## 2019-05-02 ENCOUNTER — OFFICE VISIT (OUTPATIENT)
Dept: OBGYN | Facility: CLINIC | Age: 64
End: 2019-05-02
Payer: COMMERCIAL

## 2019-05-02 ENCOUNTER — TELEPHONE (OUTPATIENT)
Dept: OBGYN | Facility: CLINIC | Age: 64
End: 2019-05-02

## 2019-05-02 VITALS
BODY MASS INDEX: 19.18 KG/M2 | WEIGHT: 134 LBS | TEMPERATURE: 98.3 F | HEART RATE: 97 BPM | RESPIRATION RATE: 16 BRPM | SYSTOLIC BLOOD PRESSURE: 111 MMHG | DIASTOLIC BLOOD PRESSURE: 78 MMHG | HEIGHT: 70 IN

## 2019-05-02 DIAGNOSIS — N95.0 POSTMENOPAUSAL BLEEDING: Primary | ICD-10-CM

## 2019-05-02 DIAGNOSIS — Z12.4 PAP SMEAR FOR CERVICAL CANCER SCREENING: ICD-10-CM

## 2019-05-02 DIAGNOSIS — N88.2 CERVICAL STENOSIS (UTERINE CERVIX): ICD-10-CM

## 2019-05-02 PROBLEM — E05.00 GRAVES DISEASE: Status: ACTIVE | Noted: 2019-05-02

## 2019-05-02 PROBLEM — F17.200 SMOKER: Status: ACTIVE | Noted: 2019-05-02

## 2019-05-02 PROCEDURE — 99203 OFFICE O/P NEW LOW 30 MIN: CPT | Performed by: OBSTETRICS & GYNECOLOGY

## 2019-05-02 PROCEDURE — G0145 SCR C/V CYTO,THINLAYER,RESCR: HCPCS | Performed by: OBSTETRICS & GYNECOLOGY

## 2019-05-02 PROCEDURE — 87624 HPV HI-RISK TYP POOLED RSLT: CPT | Performed by: OBSTETRICS & GYNECOLOGY

## 2019-05-02 RX ORDER — MISOPROSTOL 200 UG/1
TABLET ORAL
Qty: 1 TABLET | Refills: 0 | Status: SHIPPED | OUTPATIENT
Start: 2019-05-02 | End: 2019-06-10

## 2019-05-02 ASSESSMENT — MIFFLIN-ST. JEOR: SCORE: 1235.13

## 2019-05-02 NOTE — TELEPHONE ENCOUNTER
Reason for Call:  Form, our goal is to have forms completed with 72 hours, however, some forms may require a visit or additional information.    Type of letter, form or note:  medical    Who is the form from?: DM    Where did the form come from: Patient or family brought in       What clinic location was the form placed at?: [facility] (ENT, Neurology, Rheumatology, Surgery, Urology, Vascular Surgery)    Where the form was placed: Given to MA/RN    What number is listed as a contact on the form?: [phone]       Additional comments: Fax completed form to DMV @ [email or web address]    Call pt when complete    Call taken on 5/2/2019 at 12:46 PM by Denise Behrendt

## 2019-05-02 NOTE — NURSING NOTE
"Initial /78 (BP Location: Right arm, Patient Position: Chair, Cuff Size: Adult Small)   Pulse 97   Temp 98.3  F (36.8  C) (Tympanic)   Resp 16   Ht 1.765 m (5' 9.5\")   Wt 60.8 kg (134 lb)   Breastfeeding? No   BMI 19.50 kg/m   Estimated body mass index is 19.5 kg/m  as calculated from the following:    Height as of this encounter: 1.765 m (5' 9.5\").    Weight as of this encounter: 60.8 kg (134 lb). .    Swetha Rodriguez, [facility]    "

## 2019-05-02 NOTE — TELEPHONE ENCOUNTER
"  You are now scheduled for surgery at [facility].  Below are the details for your surgery.  Please read the \"Preparing for Your Surgery\" instructions and let us know if you have any questions.    Type of surgery: dilation and curettage, hysteroscopy  Surgeon:  Lauren Moise MD  Location of surgery: [facility] OR    Date of surgery: 5-15-19    Time: 12:45pm   Arrival Time: 11:45am    Time can change, to be confirmed a couple of days prior by pre-op surgery nurse.    Pre-Op Appt Date: Patient to schedule with a PCP or Family Practice Provider within 30 days to the surgery.  Post-Op Appt Date: To be determined by provider     Packet sent out: Yes  Pre-cert/Authorization completed:  TBD by Financial Securing Office.   MA Sterilization/Hysterectomy Acknowledgment Consent signed: Not Applicable    [facility]  [phone]    Fax: [phone]  Same Day Surgery [phone]  Fax: [phone]    "

## 2019-05-06 LAB
COPATH REPORT: NORMAL
PAP: NORMAL

## 2019-05-07 LAB
FINAL DIAGNOSIS: NORMAL
HPV HR 12 DNA CVX QL NAA+PROBE: NEGATIVE
HPV16 DNA SPEC QL NAA+PROBE: NEGATIVE
HPV18 DNA SPEC QL NAA+PROBE: NEGATIVE
SPECIMEN DESCRIPTION: NORMAL
SPECIMEN SOURCE CVX/VAG CYTO: NORMAL

## 2019-05-07 NOTE — TELEPHONE ENCOUNTER
Namrata Tello,  I am forwarding this note to you since I think you are seeing her on 5/9/2019.  Robb Cabrera MD  Family Medicine

## 2019-05-07 NOTE — TELEPHONE ENCOUNTER
I left a detailed message on Tonya's voicemail (authorization in chart)-- advising that Dr. Oscar's office would be addressing the DMV letter.

## 2019-05-07 NOTE — TELEPHONE ENCOUNTER
"Marci Ellsworth MD Hanacek, Cynthia D, [facility] Cc: Robb Cabrera MD   Caller: Unspecified (5 days ago, 12:45 PM)             Based on my initial exam, Tonya is a little slow but otherwise I do not see any reason (from an [place] standpoint) that she shouldn't be driving.     But, given that I only met her once, I am also sending this to Dr. Cabrera, to see if he has an opinion about her driving. The visual issue could play a role in this decision.     [facility]                Letter from [place] Dept of Public Safety states \"[employer] must determine your eligibility for [place] driving privileges with a statement and report completed and signed by your doctor.  It is necessary for your doctor to express an opinion regarding your present medical condition as it pertains to your safe operation of a motor vehicle upon the public streets and highways\".      I'm not able to find an appropriate form online for this, so appears to need a letter.  I'm unsure what the driving concern is.  Please advise and I can draft a letter.  Thanks.      "

## 2019-05-08 PROBLEM — Z12.4 SCREENING FOR CERVICAL CANCER: Status: ACTIVE | Noted: 2019-05-08

## 2019-05-09 ENCOUNTER — OFFICE VISIT (OUTPATIENT)
Dept: FAMILY MEDICINE | Facility: CLINIC | Age: 64
End: 2019-05-09
Payer: COMMERCIAL

## 2019-05-09 VITALS
RESPIRATION RATE: 18 BRPM | SYSTOLIC BLOOD PRESSURE: 112 MMHG | BODY MASS INDEX: 19.79 KG/M2 | OXYGEN SATURATION: 96 % | TEMPERATURE: 97.8 F | WEIGHT: 138.2 LBS | DIASTOLIC BLOOD PRESSURE: 68 MMHG | HEIGHT: 70 IN | HEART RATE: 72 BPM

## 2019-05-09 DIAGNOSIS — E05.90 HYPERTHYROIDISM: ICD-10-CM

## 2019-05-09 DIAGNOSIS — F32.5 MAJOR DEPRESSION IN COMPLETE REMISSION (H): ICD-10-CM

## 2019-05-09 DIAGNOSIS — G35 MULTIPLE SCLEROSIS (H): ICD-10-CM

## 2019-05-09 DIAGNOSIS — E78.2 MIXED HYPERLIPIDEMIA: ICD-10-CM

## 2019-05-09 DIAGNOSIS — Z01.818 PREOP GENERAL PHYSICAL EXAM: Primary | ICD-10-CM

## 2019-05-09 LAB
T4 FREE SERPL-MCNC: 1.57 NG/DL (ref 0.76–1.46)
TSH SERPL DL<=0.005 MIU/L-ACNC: <0.01 MU/L (ref 0.4–4)

## 2019-05-09 PROCEDURE — 99215 OFFICE O/P EST HI 40 MIN: CPT | Performed by: FAMILY MEDICINE

## 2019-05-09 PROCEDURE — 93000 ELECTROCARDIOGRAM COMPLETE: CPT | Performed by: FAMILY MEDICINE

## 2019-05-09 PROCEDURE — 84439 ASSAY OF FREE THYROXINE: CPT | Performed by: INTERNAL MEDICINE

## 2019-05-09 PROCEDURE — 84443 ASSAY THYROID STIM HORMONE: CPT | Performed by: INTERNAL MEDICINE

## 2019-05-09 PROCEDURE — 36415 COLL VENOUS BLD VENIPUNCTURE: CPT | Performed by: INTERNAL MEDICINE

## 2019-05-09 RX ORDER — SIMVASTATIN 20 MG
20 TABLET ORAL AT BEDTIME
Qty: 90 TABLET | Refills: 3 | Status: SHIPPED | OUTPATIENT
Start: 2019-05-09 | End: 2019-05-09

## 2019-05-09 RX ORDER — SIMVASTATIN 20 MG
20 TABLET ORAL AT BEDTIME
Qty: 90 TABLET | Refills: 3 | Status: SHIPPED | OUTPATIENT
Start: 2019-05-09 | End: 2020-04-22

## 2019-05-09 ASSESSMENT — MIFFLIN-ST. JEOR: SCORE: 1254.18

## 2019-05-09 NOTE — TELEPHONE ENCOUNTER
I contacted patient and set her up for an appt with Dr. Oscar 6/10.  Will need a statement stating from our opinion patient is safe to drive.

## 2019-05-09 NOTE — PATIENT INSTRUCTIONS
You may take metoprolol XL  on morning of surgery if they are scheduled to be taken then. Take only with a small sip of water.  All other scheduled medication may be held on morning of surgery, and resumed when you are allowed to eat.     Do not take Ibuprofen, Aspirin or Naproxen from now until after procedure.  If you need to take something for pain, take Acetaminophen 325 mg orally 1-2 tabs every 4-6 hrs as needed for pain    Before Your Surgery      Call your surgeon if there is any change in your health. This includes signs of a cold or flu (such as a sore throat, runny nose, cough, rash or fever).    Do not smoke, drink alcohol or take over the counter medicine (unless your surgeon or primary care doctor tells you to) for the 24 hours before and after surgery.    If you take prescribed drugs: Follow your doctor s orders about which medicines to take and which to stop until after surgery.    Eating and drinking prior to surgery: follow the instructions from your surgeon    Take a shower or bath the night before surgery. Use the soap your surgeon gave you to gently clean your skin. If you do not have soap from your surgeon, use your regular soap. Do not shave or scrub the surgery site.  Wear clean pajamas and have clean sheets on your bed.

## 2019-05-09 NOTE — PROGRESS NOTES
[facility]  [address] [facility] [address]  [phone]  Dept: [phone]    PRE-OP EVALUATION:  Today's date: 2019    Tonya Rodriguez (: 1955) presents for pre-operative evaluation assessment as requested by Dr. Moise.  She requires evaluation and anesthesia risk assessment prior to undergoing surgery/procedure for treatment of abnormal vaginal bleeding .    Proposed Surgery/ Procedure: D & C, hysteroscopy  Date of Surgery/ Procedure: 5/15/19  Time of Surgery/ Procedure: 12:45  Hospital/Surgical Facility: [facility]  Fax number for surgical facility: [facility]  Primary Physician: Robb Cabrera  Type of Anesthesia Anticipated: to be determined    Patient has a Health Care Directive or Living Will:  NO    1. NO - Do you have a history of heart attack, stroke, stent, bypass or surgery on an artery in the head, neck, heart or legs?  2. NO - Do you ever have any pain or discomfort in your chest?  3. NO - Do you have a history of  Heart Failure?  4. NO - Are you troubled by shortness of breath when: walking on the level, up a slight hill or at night?  5. NO - Do you currently have a cold, bronchitis or other respiratory infection?  6. NO - Do you have a cough, shortness of breath or wheezing?  7. NO - Do you sometimes get pains in the calves of your legs when you walk?  8. NO - Do you or anyone in your family have previous history of blood clots?  9. NO - Do you or does anyone in your family have a serious bleeding problem such as prolonged bleeding following surgeries or cuts?  10. NO - Have you ever had problems with anemia or been told to take iron pills?  11. NO - Have you had any abnormal blood loss such as black, tarry or bloody stools, or abnormal vaginal bleeding?  12. NO - Have you ever had a blood transfusion?  13. NO - Have you or any of your relatives ever had problems with anesthesia?  14. NO - Do you have sleep apnea, excessive snoring  or daytime drowsiness?  15. NO - Do you have any prosthetic heart valves?  16. NO - Do you have prosthetic joints?  17. NO - Is there any chance that you may be pregnant?      HPI:     HPI related to upcoming procedure: Patient has AUB. Hence, planned surgery. Reviewed above with patient.      See problem list for active medical problems.  Problems all longstanding and stable, except as noted/documented.  See ROS for pertinent symptoms related to these conditions.                                                                                                                                                          .  DEPRESSION - Patient has a long history of Depression of moderate severity but has been in full remission.Current symptoms of depression include none.                                                                                                                                                                                    .  HYPERLIPIDEMIA - Patient has a long history of significant Hyperlipidemia requiring medication for treatment with recent good control. Patient reports no problems or side effects with the medication.                                                                                                                                                         .  HYPERTHYROIDISM- - patient reports has been stable on methimazole and metoprolol XL. Sees endocrinologist.    MEDICAL HISTORY:     Patient Active Problem List    Diagnosis Date Noted     Screening for cervical cancer 05/08/2019     Priority: Medium     2007, 2008, 2010 NIL Paps  2016 NIL Pap, Neg HPV  5/2/19 NIL Pap, Neg HPV. Plan cotest in 5 years.        Graves disease 05/02/2019     Priority: Medium     Smoker 05/02/2019     Priority: Medium     Insomnia, unspecified type 01/16/2019     Priority: Medium     Self-catheterizes urinary bladder 03/05/2015     Priority: Medium     Speech dysfluency 02/08/2015     Priority: Medium      Family history of rheumatoid arthritis 02/05/2015     Priority: Medium     Urinary incontinence 01/15/2014     Priority: Medium     Restless leg 01/15/2014     Priority: Medium     Herpes keratitis 01/15/2014     Priority: Medium     Advanced directives, counseling/discussion 01/15/2014     Priority: Medium     Major depression in complete remission (H) 09/16/2013     Priority: Medium     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 Provider to review and confirm.       Dysphagia 06/16/2009     Priority: Medium     Apparently neuromuscular -related to MS       Allergic rhinitis 06/19/2005     Priority: Medium     Other chronic allergic conjunctivitis 06/19/2005     Priority: Medium     Multiple sclerosis (H) 06/10/2005     Priority: Medium      Past Medical History:   Diagnosis Date     Chest pain, unspecified 3/22/04    10/10 initially inseverity - responded to Aspirin and two doses of Nitroglycerin sublingual      Herpes simplex with other ophthalmic complications     on valtrex     Lipoma of unspecified site     Lower left suprascapular     Migraine, unspecified, with intractable migraine, so stated, without mention of status migrainosus 1/20/2007    midrin - about 30 pill a year     Past Surgical History:   Procedure Laterality Date     APPENDECTOMY  1980's    Retained suture     ARTHROPLASTY HIP  9/19/2012    Procedure: ARTHROPLASTY HIP;  Right Total Hip Minimally Invasive Surgery;  Surgeon: Devendra Douglas MD;  Location: [facility] OR     ARTHROSCOPY KNEE RT/LT  1989    Arthroscopy of the Left Knee     BUNIONECTOMY RT/LT      Bilateral bunionectomies x4 surgeries     EXCISE LESION TRUNK N/A 4/17/2015    Procedure: EXCISE LESION TRUNK;  Surgeon: Wander Gutierrez MD;  Location: [facility] OR     SURGICAL HISTORY OF  -   1974, 1977    two normal deliveries     TUBAL LIGATION  1981-82     Current Outpatient Medications   Medication Sig Dispense Refill     gabapentin (NEURONTIN) 100 MG capsule Take 2 capsules (200 mg) by mouth At Bedtime 180 capsule 3     imipramine (TOFRANIL) 50 MG tablet Take 1 tablet (50 mg) by mouth At Bedtime 90 tablet 3     methimazole (TAPAZOLE) 10 MG tablet Take 2-tablets (20 mg) by mouth daily as directed 60 tablet 5     metoprolol succinate ER (TOPROL-XL) 25 MG 24 hr tablet Take 1 tablet (25 mg) by mouth daily 30 tablet 9     oxybutynin ER (DITROPAN XL) 15 MG 24 hr tablet Take 1 tablet (15 mg) by mouth 2 times daily 180 tablet 3     simvastatin (ZOCOR) 20 MG tablet Take 1 tablet (20 mg) by mouth At Bedtime 90 tablet 3     misoprostol (CYTOTEC) 200 MCG tablet Place 1 tablet in the vagina the night before your procedure (Patient not taking: Reported on 5/9/2019) 1 tablet 0     ORDER FOR DME Equipment being ordered: urinary catheter 6 times daily 540 catheter 3     traZODone (DESYREL) 50 MG tablet Take 1 tablet (50 mg) by mouth At Bedtime (Patient not taking: Reported on 5/2/2019) 90 tablet 3     valACYclovir (VALTREX) 500 MG tablet Take 1 tablet (500 mg) by mouth daily (Patient not taking: Reported on 5/2/2019) 90 tablet 3     OTC products: Vitamin D supplement    Allergies   Allergen Reactions     Cipro [Ciprofloxacin] Rash     gets yeast infections - BAD with.     Lactulose GI Disturbance     Caused Vomiting      Latex Allergy: NO    Social History     Tobacco Use     Smoking status: Current Every Day Smoker     Packs/day: 1.00     Years: 45.00     Pack years: 45.00     Types: Cigarettes     Smokeless tobacco: Never Used   Substance Use Topics     Alcohol use: No     History   Drug Use No       REVIEW OF SYSTEMS:   CONSTITUTIONAL: NEGATIVE for fever, chills or change in weight  INTEGUMENTARY/SKIN: NEGATIVE for worrisome rashes, moles or lesions  EYES: NEGATIVE for vision changes or irritation  ENT/MOUTH:  "NEGATIVE for ear, mouth and throat problems  RESP: NEGATIVE for significant cough or SOB  CV: NEGATIVE for chest pain, palpitations or peripheral edema  GI: NEGATIVE for nausea, abdominal pain, heartburn, or change in bowel habits  : NEGATIVE for frequency, dysuria, or hematuria  MUSCULOSKELETAL: NEGATIVE for significant arthralgias or myalgia  NEURO: NEGATIVE for weakness, dizziness or paresthesias  ENDOCRINE: NEGATIVE for temperature intolerance, skin/hair changes  HEME: NEGATIVE for bleeding problems  PSYCHIATRIC: NEGATIVE for changes in mood or affect    EXAM:   /68   Pulse 72   Temp 97.8  F (36.6  C) (Tympanic)   Resp 18   Ht 1.765 m (5' 9.5\")   Wt 62.7 kg (138 lb 3.2 oz)   SpO2 96%   BMI 20.12 kg/m    GENERAL APPEARANCE: underweight, alert and no distress; ambulatory w/o assist  EYES: pink conj, no icterus, PERRL, EOMI  HENT: ear canals and TM's normal, nose and mouth without ulcers or lesions, oropharynx clear and oral mucous membranes moist  NECK: no adenopathy, no asymmetry, masses, or scars and thyroid normal to palpation  RESP: lungs clear to auscultation - no rales, rhonchi or wheezes  CV: regular rates and rhythm, normal S1 S2, no S3 or S4, no murmur, click or rub, no peripheral edema and peripheral pulses strong  ABDOMEN: soft, nontender, no hepatosplenomegaly, no masses and bowel sounds normal  MS: no musculoskeletal defects are noted and gait is age appropriate without ataxia  SKIN: good turgor, no rash/jaundice/ecchymosis  NEURO: Normal strength and tone, sensory exam grossly normal, mentation intact and speech normal    DIAGNOSTICS:     EKG: sinus rhythm, RSR V1, possible LAD, normal axis, normal intervals, no acute ST/T changes c/w ischemia, no LVH by voltage criteria.  Labs Resulted Today:   Results for orders placed or performed in visit on 05/02/19   Pap imaged thin layer screen with HPV - recommended age 30 - 65 years (select HPV order below)   Result Value Ref Range    PAP NIL  "    Copath Report         Patient Name: ANDRE RAMOS  MR#: [id number]  Specimen #: [id number]  Collected: 5/2/2019  Received: 5/3/2019  Reported: 5/6/2019 15:10  Ordering Phy(s): R[facility]AN [facility]    For improved result formatting, select 'View Enhanced Report Format' under   Linked Documents section.    SPECIMEN/STAIN PROCESS:  Pap imaged thin layer prep screening (Surepath, FocalPoint with guided   screening)       Pap-Cyto x 1, HPV ordered x 1    SOURCE: Cervical, endocervical  ----------------------------------------------------------------   Pap imaged thin layer prep screening (Surepath, FocalPoint with guided   screening)  SPECIMEN ADEQUACY:  Satisfactory for evaluation.  -Transitional zone component could not be determined due to atrophy.    CYTOLOGIC INTERPRETATION:    Negative for intraepithelial lesion or malignancy    Electronically signed out by:  Behzad CERVANTES, (ASCP)    CLINICAL HISTORY:    Post Menopausal  Irregular Bleeding, A previous normal pap  Date of Last Pap: 12/08/201 6,    Papanicolaou Test Limitations:  Cervical cytology is a screening test with   limited sensitivity; regular  screening is critical for cancer prevention; Pap tests are primarily   effective for the diagnosis/prevention of  squamous cell carcinoma, not adenocarcinomas or other cancers.    COLLECTION SITE:  Client:  [facility]  Location: [facility])    The technical component of this testing was completed at the General acute hospital, with the professional component performed   at the General acute hospital, [address] [place] [phone] ([phone])       HPV High Risk Types DNA Cervical   Result Value Ref Range    HPV Source SurePath     HPV 16 DNA Negative NEG^Negative    HPV 18 DNA Negative NEG^Negative    Other HR HPV Negative NEG^Negative    Final Diagnosis This patient's  sample is negative for HPV DNA.     Specimen Description Cervical Cells        Recent Labs   Lab Test 04/12/19  1933 02/13/19  1029  10/15/12 10/11/12  04/12/11  0921   HGB 14.1 14.4   < >  --   --    < >  --     342   < >  --   --   --   --    INR  --   --   --  3.0* 2.4*   < >  --     142   < >  --   --   --  141   POTASSIUM 3.7 4.1   < >  --   --   --  4.2   CR 0.70 0.68   < >  --   --   --  1.01   A1C  --   --   --   --   --   --  6.0    < > = values in this interval not displayed.        IMPRESSION:   Reason for surgery/procedure: AUB  Diagnosis/reason for consult: preop evaluation, multiple chronic stable conditions.    The proposed surgical procedure is considered INTERMEDIATE risk.    REVISED CARDIAC RISK INDEX  The patient has the following serious cardiovascular risks for perioperative complications such as (MI, PE, VFib and 3  AV Block):  No serious cardiac risks  INTERPRETATION: 0 risks: Class I (very low risk - 0.4% complication rate)    The patient has the following additional risks for perioperative complications:  No identified additional risks  The 10-year ASCVD risk score (Rhettdoe DERAS Jr., et al., 2013) is: 6.7%    Values used to calculate the score:      Age: 63 years      Sex: Female      Is Non- : No      Diabetic: No      Tobacco smoker: Yes      Systolic Blood Pressure: 112 mmHg      Is BP treated: No      HDL Cholesterol: 63 mg/dL      Total Cholesterol: 220 mg/dL      ICD-10-CM    1. Preop general physical exam Z01.818 EKG 12-lead complete w/read - Clinics   2. Hyperthyroidism E05.90 T4 free     TSH     EKG 12-lead complete w/read - Clinics   3. Mixed hyperlipidemia E78.2 EKG 12-lead complete w/read - Clinics     simvastatin (ZOCOR) 20 MG tablet     DISCONTINUED: simvastatin (ZOCOR) 20 MG tablet   4. Multiple sclerosis (H) G35    5. Major depression in complete remission (H) F32.5        RECOMMENDATIONS:     --Consult hospital rounder / IM to assist post-op  medical management  Routine DVT precautions recommended.    Cardiovascular Risk  Performs 4 METs exercise without symptoms (Light housework (dusting, washing dishes), Climb a flight of stairs and Walk on level ground at 15 minutes per mile (4 miles/hour)) .   Patient is already on a Beta Blocker. Continue Betablocker therapy after surgery, using Beta blocker order set as necessary for NPO status.  EKG performed due to hx of hyperthyroidism and hyperlipidemia. Aside from LAD no acute findings seen in today's tracing. Compared to previous, the LAD is new but rest of EKG appears consistent.  Patient is considered low cardiac risk for surgery.    --Patient is to take all scheduled medications on the day of surgery EXCEPT for modifications listed below.    Anticoagulant or Antiplatelet Medication Use  ASPIRIN: avoid  NSAIDS: avoid        APPROVAL GIVEN to proceed with proposed procedure, without further diagnostic evaluation       Signed Electronically by: Amanuel Tello MD    Copy of this evaluation report is provided to requesting physician.    [facility] Preop Guidelines    Revised Cardiac Risk Index

## 2019-05-14 ENCOUNTER — ANESTHESIA EVENT (OUTPATIENT)
Dept: SURGERY | Facility: CLINIC | Age: 64
End: 2019-05-14
Payer: COMMERCIAL

## 2019-05-14 NOTE — ANESTHESIA PREPROCEDURE EVALUATION
Anesthesia Evaluation     . Pt has had prior anesthetic.     No history of anesthetic complications          ROS/MED HX    ENT/Pulmonary: Comment: conjunctivitis      Neurologic:     (+)migraines, Multiple Sclerosis other neuro RLS    Cardiovascular: Comment: Chest pain - neg cardiovascular ROS       METS/Exercise Tolerance:  1 - Eating, dressing   Hematologic:  - neg hematologic  ROS       Musculoskeletal:   (+)  other musculoskeletal- DJD      GI/Hepatic: Comment: dysphagia    (+) GERD       Renal/Genitourinary:  - ROS Renal section negative       Endo:     (+) Obesity, .      Psychiatric:  - neg psychiatric ROS       Infectious Disease:  - neg infectious disease ROS       Malignancy:      - no malignancy   Other:    - neg other ROS                 Physical Exam  Normal systems: cardiovascular and pulmonary    Airway   Mallampati: I  TM distance: >3 FB  Neck ROM: full    Dental   (+) upper dentures    Cardiovascular       Pulmonary                     Anesthesia Plan      History & Physical Review  History and physical reviewed and following examination; no interval change.    ASA Status:  2 .        Plan for MAC          Postoperative Care      Consents  Anesthetic plan, risks, benefits and alternatives discussed with:  Patient or representative..                            .    Anesthesia Plan      History & Physical Review  History and physical reviewed and following examination; no interval change.    ASA Status:  2 .        Plan for MAC          Postoperative Care      Consents  Anesthetic plan, risks, benefits and alternatives discussed with:  Patient or representative..

## 2019-05-15 ENCOUNTER — ANESTHESIA (OUTPATIENT)
Dept: SURGERY | Facility: CLINIC | Age: 64
End: 2019-05-15
Payer: COMMERCIAL

## 2019-05-15 ENCOUNTER — HOSPITAL ENCOUNTER (OUTPATIENT)
Facility: CLINIC | Age: 64
Discharge: HOME | End: 2019-05-15
Attending: OBSTETRICS & GYNECOLOGY | Admitting: OBSTETRICS & GYNECOLOGY
Payer: COMMERCIAL

## 2019-05-15 VITALS
HEIGHT: 69 IN | OXYGEN SATURATION: 99 % | WEIGHT: 138 LBS | TEMPERATURE: 98.2 F | DIASTOLIC BLOOD PRESSURE: 52 MMHG | RESPIRATION RATE: 16 BRPM | HEART RATE: 64 BPM | BODY MASS INDEX: 20.44 KG/M2 | SYSTOLIC BLOOD PRESSURE: 123 MMHG

## 2019-05-15 DIAGNOSIS — N95.0 POSTMENOPAUSAL BLEEDING: Primary | ICD-10-CM

## 2019-05-15 PROCEDURE — 27210794 ZZH OR GENERAL SUPPLY STERILE: Performed by: OBSTETRICS & GYNECOLOGY

## 2019-05-15 PROCEDURE — 25000125 ZZHC RX 250: Performed by: NURSE ANESTHETIST, CERTIFIED REGISTERED

## 2019-05-15 PROCEDURE — 37000008 ZZH ANESTHESIA TECHNICAL FEE, 1ST 30 MIN: Performed by: OBSTETRICS & GYNECOLOGY

## 2019-05-15 PROCEDURE — 37000009 ZZH ANESTHESIA TECHNICAL FEE, EACH ADDTL 15 MIN: Performed by: OBSTETRICS & GYNECOLOGY

## 2019-05-15 PROCEDURE — 40000306 ZZH STATISTIC PRE PROC ASSESS II: Performed by: OBSTETRICS & GYNECOLOGY

## 2019-05-15 PROCEDURE — 71000027 ZZH RECOVERY PHASE 2 EACH 15 MINS: Performed by: OBSTETRICS & GYNECOLOGY

## 2019-05-15 PROCEDURE — 36000058 ZZH SURGERY LEVEL 3 EA 15 ADDTL MIN: Performed by: OBSTETRICS & GYNECOLOGY

## 2019-05-15 PROCEDURE — 88305 TISSUE EXAM BY PATHOLOGIST: CPT | Mod: 26 | Performed by: OBSTETRICS & GYNECOLOGY

## 2019-05-15 PROCEDURE — 25000128 H RX IP 250 OP 636: Performed by: NURSE ANESTHETIST, CERTIFIED REGISTERED

## 2019-05-15 PROCEDURE — 25800030 ZZH RX IP 258 OP 636: Performed by: NURSE ANESTHETIST, CERTIFIED REGISTERED

## 2019-05-15 PROCEDURE — 93005 ELECTROCARDIOGRAM TRACING: CPT | Mod: 59

## 2019-05-15 PROCEDURE — 88305 TISSUE EXAM BY PATHOLOGIST: CPT | Performed by: OBSTETRICS & GYNECOLOGY

## 2019-05-15 PROCEDURE — 58558 HYSTEROSCOPY BIOPSY: CPT | Performed by: OBSTETRICS & GYNECOLOGY

## 2019-05-15 PROCEDURE — 36000056 ZZH SURGERY LEVEL 3 1ST 30 MIN: Performed by: OBSTETRICS & GYNECOLOGY

## 2019-05-15 PROCEDURE — 25000132 ZZH RX MED GY IP 250 OP 250 PS 637: Performed by: NURSE ANESTHETIST, CERTIFIED REGISTERED

## 2019-05-15 RX ORDER — OXYCODONE HYDROCHLORIDE 5 MG/1
5-10 TABLET ORAL
Status: CANCELLED | OUTPATIENT
Start: 2019-05-15

## 2019-05-15 RX ORDER — LIDOCAINE HYDROCHLORIDE 10 MG/ML
INJECTION, SOLUTION INFILTRATION; PERINEURAL PRN
Status: DISCONTINUED | OUTPATIENT
Start: 2019-05-15 | End: 2019-05-15

## 2019-05-15 RX ORDER — OXYCODONE HYDROCHLORIDE 5 MG/1
5-10 TABLET ORAL EVERY 4 HOURS PRN
Qty: 3 TABLET | Refills: 0 | Status: SHIPPED | OUTPATIENT
Start: 2019-05-15 | End: 2019-06-10

## 2019-05-15 RX ORDER — OXYCODONE HYDROCHLORIDE 5 MG/1
5 TABLET ORAL
Status: CANCELLED | OUTPATIENT
Start: 2019-05-15

## 2019-05-15 RX ORDER — HYDROMORPHONE HYDROCHLORIDE 1 MG/ML
.3-.5 INJECTION, SOLUTION INTRAMUSCULAR; INTRAVENOUS; SUBCUTANEOUS EVERY 10 MIN PRN
Status: DISCONTINUED | OUTPATIENT
Start: 2019-05-15 | End: 2019-05-15 | Stop reason: HOSPADM

## 2019-05-15 RX ORDER — ONDANSETRON 4 MG/1
4 TABLET, ORALLY DISINTEGRATING ORAL
Status: CANCELLED | OUTPATIENT
Start: 2019-05-15

## 2019-05-15 RX ORDER — ALBUTEROL SULFATE 0.83 MG/ML
2.5 SOLUTION RESPIRATORY (INHALATION) EVERY 4 HOURS PRN
Status: DISCONTINUED | OUTPATIENT
Start: 2019-05-15 | End: 2019-05-15 | Stop reason: HOSPADM

## 2019-05-15 RX ORDER — MEPERIDINE HYDROCHLORIDE 25 MG/ML
12.5 INJECTION INTRAMUSCULAR; INTRAVENOUS; SUBCUTANEOUS
Status: DISCONTINUED | OUTPATIENT
Start: 2019-05-15 | End: 2019-05-15 | Stop reason: HOSPADM

## 2019-05-15 RX ORDER — IBUPROFEN 600 MG/1
600 TABLET, FILM COATED ORAL
Status: CANCELLED | OUTPATIENT
Start: 2019-05-15

## 2019-05-15 RX ORDER — FENTANYL CITRATE 50 UG/ML
25-50 INJECTION, SOLUTION INTRAMUSCULAR; INTRAVENOUS
Status: DISCONTINUED | OUTPATIENT
Start: 2019-05-15 | End: 2019-05-15 | Stop reason: HOSPADM

## 2019-05-15 RX ORDER — ONDANSETRON 4 MG/1
4-8 TABLET, ORALLY DISINTEGRATING ORAL EVERY 8 HOURS PRN
Qty: 4 TABLET | Refills: 0 | Status: SHIPPED | OUTPATIENT
Start: 2019-05-15 | End: 2019-06-10

## 2019-05-15 RX ORDER — FENTANYL CITRATE 50 UG/ML
INJECTION, SOLUTION INTRAMUSCULAR; INTRAVENOUS PRN
Status: DISCONTINUED | OUTPATIENT
Start: 2019-05-15 | End: 2019-05-15

## 2019-05-15 RX ORDER — HYDROXYZINE HYDROCHLORIDE 25 MG/1
25 TABLET, FILM COATED ORAL
Status: CANCELLED | OUTPATIENT
Start: 2019-05-15

## 2019-05-15 RX ORDER — ACETAMINOPHEN 325 MG/1
650 TABLET ORAL EVERY 4 HOURS PRN
Qty: 50 TABLET | Refills: 0 | Status: SHIPPED | OUTPATIENT
Start: 2019-05-15 | End: 2019-06-10

## 2019-05-15 RX ORDER — ONDANSETRON 2 MG/ML
4 INJECTION INTRAMUSCULAR; INTRAVENOUS EVERY 30 MIN PRN
Status: DISCONTINUED | OUTPATIENT
Start: 2019-05-15 | End: 2019-05-15 | Stop reason: HOSPADM

## 2019-05-15 RX ORDER — SODIUM CHLORIDE, SODIUM LACTATE, POTASSIUM CHLORIDE, CALCIUM CHLORIDE 600; 310; 30; 20 MG/100ML; MG/100ML; MG/100ML; MG/100ML
INJECTION, SOLUTION INTRAVENOUS CONTINUOUS
Status: DISCONTINUED | OUTPATIENT
Start: 2019-05-15 | End: 2019-05-15 | Stop reason: HOSPADM

## 2019-05-15 RX ORDER — ONDANSETRON 4 MG/1
4 TABLET, ORALLY DISINTEGRATING ORAL EVERY 6 HOURS PRN
Status: CANCELLED | OUTPATIENT
Start: 2019-05-15

## 2019-05-15 RX ORDER — LIDOCAINE 40 MG/G
CREAM TOPICAL
Status: DISCONTINUED | OUTPATIENT
Start: 2019-05-15 | End: 2019-05-15 | Stop reason: HOSPADM

## 2019-05-15 RX ORDER — HYDROMORPHONE HYDROCHLORIDE 1 MG/ML
0.2 INJECTION, SOLUTION INTRAMUSCULAR; INTRAVENOUS; SUBCUTANEOUS
Status: CANCELLED | OUTPATIENT
Start: 2019-05-15

## 2019-05-15 RX ORDER — ONDANSETRON 4 MG/1
4 TABLET, ORALLY DISINTEGRATING ORAL EVERY 30 MIN PRN
Status: DISCONTINUED | OUTPATIENT
Start: 2019-05-15 | End: 2019-05-15 | Stop reason: HOSPADM

## 2019-05-15 RX ORDER — IBUPROFEN 600 MG/1
600 TABLET, FILM COATED ORAL EVERY 6 HOURS PRN
Status: CANCELLED | OUTPATIENT
Start: 2019-05-15

## 2019-05-15 RX ORDER — KETOROLAC TROMETHAMINE 30 MG/ML
30 INJECTION, SOLUTION INTRAMUSCULAR; INTRAVENOUS EVERY 6 HOURS PRN
Status: DISCONTINUED | OUTPATIENT
Start: 2019-05-15 | End: 2019-05-15 | Stop reason: HOSPADM

## 2019-05-15 RX ORDER — PROPOFOL 10 MG/ML
INJECTION, EMULSION INTRAVENOUS CONTINUOUS PRN
Status: DISCONTINUED | OUTPATIENT
Start: 2019-05-15 | End: 2019-05-15

## 2019-05-15 RX ORDER — ONDANSETRON 2 MG/ML
INJECTION INTRAMUSCULAR; INTRAVENOUS PRN
Status: DISCONTINUED | OUTPATIENT
Start: 2019-05-15 | End: 2019-05-15

## 2019-05-15 RX ORDER — ONDANSETRON 2 MG/ML
4 INJECTION INTRAMUSCULAR; INTRAVENOUS EVERY 6 HOURS PRN
Status: CANCELLED | OUTPATIENT
Start: 2019-05-15

## 2019-05-15 RX ORDER — ACETAMINOPHEN 325 MG/1
650 TABLET ORAL EVERY 6 HOURS PRN
Status: CANCELLED | OUTPATIENT
Start: 2019-05-15

## 2019-05-15 RX ORDER — KETOROLAC TROMETHAMINE 30 MG/ML
30 INJECTION, SOLUTION INTRAMUSCULAR; INTRAVENOUS
Status: CANCELLED | OUTPATIENT
Start: 2019-05-15 | End: 2019-05-20

## 2019-05-15 RX ORDER — ACETAMINOPHEN 325 MG/1
975 TABLET ORAL ONCE
Status: DISCONTINUED | OUTPATIENT
Start: 2019-05-15 | End: 2019-05-15 | Stop reason: HOSPADM

## 2019-05-15 RX ORDER — ACETAMINOPHEN 325 MG/1
975 TABLET ORAL ONCE
Status: COMPLETED | OUTPATIENT
Start: 2019-05-15 | End: 2019-05-15

## 2019-05-15 RX ORDER — NALOXONE HYDROCHLORIDE 0.4 MG/ML
.1-.4 INJECTION, SOLUTION INTRAMUSCULAR; INTRAVENOUS; SUBCUTANEOUS
Status: DISCONTINUED | OUTPATIENT
Start: 2019-05-15 | End: 2019-05-15 | Stop reason: HOSPADM

## 2019-05-15 RX ADMIN — MIDAZOLAM 2 MG: 1 INJECTION INTRAMUSCULAR; INTRAVENOUS at 12:40

## 2019-05-15 RX ADMIN — PROPOFOL 50 MCG/KG/MIN: 10 INJECTION, EMULSION INTRAVENOUS at 13:15

## 2019-05-15 RX ADMIN — MIDAZOLAM 2 MG: 1 INJECTION INTRAMUSCULAR; INTRAVENOUS at 12:27

## 2019-05-15 RX ADMIN — ONDANSETRON 4 MG: 2 INJECTION INTRAMUSCULAR; INTRAVENOUS at 13:34

## 2019-05-15 RX ADMIN — FENTANYL CITRATE 50 MCG: 50 INJECTION, SOLUTION INTRAMUSCULAR; INTRAVENOUS at 12:32

## 2019-05-15 RX ADMIN — ACETAMINOPHEN 975 MG: 325 TABLET, FILM COATED ORAL at 12:18

## 2019-05-15 RX ADMIN — LIDOCAINE HYDROCHLORIDE 1 ML: 10 INJECTION, SOLUTION EPIDURAL; INFILTRATION; INTRACAUDAL; PERINEURAL at 12:17

## 2019-05-15 RX ADMIN — SODIUM CHLORIDE, POTASSIUM CHLORIDE, SODIUM LACTATE AND CALCIUM CHLORIDE: 600; 310; 30; 20 INJECTION, SOLUTION INTRAVENOUS at 12:17

## 2019-05-15 RX ADMIN — FENTANYL CITRATE 50 MCG: 50 INJECTION, SOLUTION INTRAMUSCULAR; INTRAVENOUS at 12:40

## 2019-05-15 RX ADMIN — FENTANYL CITRATE 50 MCG: 50 INJECTION, SOLUTION INTRAMUSCULAR; INTRAVENOUS at 12:27

## 2019-05-15 RX ADMIN — LIDOCAINE HYDROCHLORIDE 50 MG: 10 INJECTION, SOLUTION INFILTRATION; PERINEURAL at 12:29

## 2019-05-15 ASSESSMENT — MIFFLIN-ST. JEOR: SCORE: 1245.34

## 2019-05-15 NOTE — PROGRESS NOTES
Intraop progress note    After patient was brought to the OR, her IV infiltrated.  A new IV needed to be placed, which was successful after multiple attempts.  During the IV attempts, the patient was distressed as she has a known needle phobia.  During this time, the EKG lead on the anesthesia machines indicated a possible ST elevation.  After the IV was found to be patent, a 12 lead EKG was obtained which revealed no change and no ST elevation from her prior pre-operative EKG.  She remains asymptomatic without chest pain or shortness of breath.       informed of the events by CRNA and warning signs for MI/CAD were discussed with him.  Patient underwent an uneventful D&C hysteroscopy and is stable in her Phase 2 recovery bed.    Lauren Moise M.D.

## 2019-05-15 NOTE — ANESTHESIA CARE TRANSFER NOTE
Patient: Tonya Rodriguez    Procedure(s):  DILATION AND CURETTAGE,Hysteroscopy    Diagnosis: postmenopausal bleeding  Diagnosis Additional Information: No value filed.    Anesthesia Type:   MAC     Note:  Airway :Nasal Cannula  Patient transferred to:Phase II  Handoff Report: Identifed the Patient, Identified the Reponsible Provider, Reviewed the pertinent medical history, Discussed the surgical course, Reviewed Intra-OP anesthesia mangement and issues during anesthesia, Set expectations for post-procedure period and Allowed opportunity for questions and acknowledgement of understanding      Vitals: (Last set prior to Anesthesia Care Transfer)    CRNA VITALS  5/15/2019 1306 - 5/15/2019 1345      5/15/2019             Pulse:  73    SpO2:  100 %                Electronically Signed By: Mervat Mejias CRNA, APRN CRNA  May 15, 2019  1:45 PM

## 2019-05-15 NOTE — ANESTHESIA POSTPROCEDURE EVALUATION
Patient: Tonya Rodriguez    Procedure(s):  DILATION AND CURETTAGE,Hysteroscopy    Diagnosis:postmenopausal bleeding  Diagnosis Additional Information: No value filed.    Anesthesia Type:  MAC    Note:  Anesthesia Post Evaluation    Patient location during evaluation: Bedside  Patient participation: Able to fully participate in evaluation  Level of consciousness: awake and alert  Pain management: adequate  Airway patency: patent  Cardiovascular status: acceptable  Respiratory status: acceptable  Hydration status: acceptable  PONV: none     Anesthetic complications: None          Last vitals:  Vitals:    05/15/19 1141   BP: 131/42   Pulse: 64   Temp: 36.8  C (98.2  F)   SpO2: 100%         Electronically Signed By: Mervat Mejias CRNA, APRN CRNA  May 15, 2019  1:46 PM

## 2019-05-15 NOTE — BRIEF OP NOTE
[facility] Brief Operative Note    Pre-operative diagnosis: postmenopausal bleeding   Post-operative diagnosis postmenopausal bleeding   Procedure: Procedure(s):  DILATION AND CURETTAGE,Hysteroscopy   Surgeon(s): Surgeon(s) and Role:     * Lauren Moise MD - Primary   Estimated blood loss: 2 mL    Specimens: ID Type Source Tests Collected by Time Destination   A : ENDOMETRIAL CURRETTINGS Tissue Endometrium SURGICAL PATHOLOGY EXAM Lauren Moise MD 5/15/2019  1:22 PM       Findings: Small endometrial polyp arising off the right fundal wall, bilateral tubal ostia visualized

## 2019-05-15 NOTE — DISCHARGE INSTRUCTIONS
Same Day Surgery Discharge Instructions  Special Precautions After Surgery - Adult    1. It is not unusual to feel lightheaded or faint, up to 24 hours after surgery or while taking pain medication.  If you have these symptoms; sit for a few minutes before standing and have someone assist you when getting up.  2. You should rest and relax for the next 24 hours and must have someone stay with you for at least 24 hours after your discharge.  3. DO NOT DRIVE any vehicle or operate mechanical equipment for 24 hours following the end of your surgery.  DO NOT DRIVE while taking narcotic pain medications that have been prescribed by your physician.  If you had a limb operated on, you must be able to use it fully to drive.  4. DO NOT drink alcoholic beverages for 24 hours following surgery or while taking prescription pain medication.  5. Drink clear liquids (apple juice, ginger ale, broth, 7-Up, etc.).  Progress to your regular diet as you feel able.  6. Any questions call your physician and do not make important decisions for 24 hours.        [facility]:  [phone]     Same Day Surgery [phone], Monday thru Friday 6am-9pm.    Break through Bleeding  As instructed per Surgeon or Nurse.    Post Op Infection  Be alert for signs of infection: redness, swelling, heat, drainage of pus, and/or elevated temperature.  Contact your surgeon if these occur.    Nausea   If post op nausea occurs, at first rest your stomach for a few hours by eating nothing solid and sipping only clear liquids.  Call your Surgeon if nausea does not resolve in 24 hours.

## 2019-05-15 NOTE — OP NOTE
Op Note    Preop Dx: 1. Postmenopausal bleeding    Postop DX: 1. Postmenopausal bleeding    Procedure: Dilation and curettage hysteroscopy    Surgeon: Lauren Moise M.D.     Assist: n/a    Anesthesia: local MAC    FRA Score: 2    EBL: minimal    IVF: see anesthesia records    UOP: see anesthesia records    Complications: none    Findings:  Small endometrial polyp arising off the right fundal wall, bilateral tubal ostia visualized    Indications: Tonya Rodriguez is a 63 year old  who had an episode of postmenopausal bleeding.  Ultrasound revealed a small amount of fluid in the endometrium and a possible 0.3 x 1.4 cm polyp.  Biopsy was unattainable in the office due to cervical stenosis.  Decision was made to proceed with dilation and curettage hysteroscopy with ablation.  Risks and benefits were discussed including risks of bleeding, infection and damage to the uterus.  Patient desired to proceed.     Procedure:  Patient was brought to the operating room and monitored anesthesia care was provided without difficulty.  She was prepped and draped in the usual sterile fashion in the dorsal lithotomy position.  A bivalve speculum was placed in the vagina.  The anterior lip of the cervix was infiltrated with 2 cc of 2% lidocaine and grasped with a single-toothed tenaculum.  A paracervical block was performed using 4 cc of 2% lidocaine at the 4 and 7 o'clock positions.  Using lacrimal duct probes, the cervical os was identified and dilated.  Once the regular dilators could be inserted, the cervix was gradually dilated to 6 mm.  An endometrial Pipelle was introduced and a endometrial sample was obtained.  The hysteroscope was introduced and the above findings noted.  The Myosure tissue extractor was introduced and the polyp removed, and the specimen was sent to pathology.  The Pipelle was re-introduced and the remaining endometrial sample was obtained.  All instruments were removed from the vagina and excellent  hemostasis was noted.      Patient tolerated the procedure well.  Sponge, lap and needle counts are correct.  I was present for the entire procedure.  The patient was taken to her recovery room in stable condition.

## 2019-05-17 LAB — COPATH REPORT: NORMAL

## 2019-06-10 ENCOUNTER — OFFICE VISIT (OUTPATIENT)
Dept: OPHTHALMOLOGY | Facility: CLINIC | Age: 64
End: 2019-06-10
Attending: OPHTHALMOLOGY
Payer: COMMERCIAL

## 2019-06-10 DIAGNOSIS — E07.9 THYROID EYE DISEASE: ICD-10-CM

## 2019-06-10 DIAGNOSIS — E07.9 THYROID EYE DISEASE: Primary | ICD-10-CM

## 2019-06-10 DIAGNOSIS — H57.89 THYROID EYE DISEASE: Primary | ICD-10-CM

## 2019-06-10 DIAGNOSIS — H57.89 THYROID EYE DISEASE: ICD-10-CM

## 2019-06-10 DIAGNOSIS — H17.9 LEFT CORNEAL SCAR: ICD-10-CM

## 2019-06-10 PROCEDURE — 92081 LIMITED VISUAL FIELD XM: CPT | Mod: ZF | Performed by: OPHTHALMOLOGY

## 2019-06-10 PROCEDURE — G0463 HOSPITAL OUTPT CLINIC VISIT: HCPCS | Mod: ZF | Performed by: TECHNICIAN/TECHNOLOGIST

## 2019-06-10 PROCEDURE — 92015 DETERMINE REFRACTIVE STATE: CPT | Mod: ZF | Performed by: TECHNICIAN/TECHNOLOGIST

## 2019-06-10 RX ORDER — VALACYCLOVIR HYDROCHLORIDE 500 MG/1
500 TABLET, FILM COATED ORAL DAILY
Qty: 90 TABLET | Refills: 1 | Status: SHIPPED | OUTPATIENT
Start: 2019-06-10 | End: 2019-11-27

## 2019-06-10 ASSESSMENT — REFRACTION_MANIFEST
OD_AXIS: 160
OD_CYLINDER: +0.75
OS_ADD: +2.50
OS_AXIS: 180
OS_SPHERE: -9.25
OS_CYLINDER: +2.50
OD_ADD: +2.50
OD_SPHERE: -8.75

## 2019-06-10 ASSESSMENT — REFRACTION_WEARINGRX
OS_CYLINDER: +2.50
OD_CYLINDER: +0.75
OD_SPHERE: -8.00
OS_SPHERE: -9.00
OS_ADD: +2.50
OD_AXIS: 167
SPECS_TYPE: PAL
OD_ADD: +2.50
OS_AXIS: 180

## 2019-06-10 ASSESSMENT — EXTERNAL EXAM - RIGHT EYE: OD_EXAM: PROPTOSIS

## 2019-06-10 ASSESSMENT — TONOMETRY
OS_IOP_MMHG: 18
IOP_METHOD: ICARE
OD_IOP_MMHG: 21

## 2019-06-10 ASSESSMENT — VISUAL ACUITY
CORRECTION_TYPE: GLASSES
OS_CC: 20/200
METHOD: SNELLEN - LINEAR
OD_CC: 20/40

## 2019-06-10 ASSESSMENT — CONF VISUAL FIELD
OS_NORMAL: 1
OD_NORMAL: 1
METHOD: COUNTING FINGERS

## 2019-06-10 ASSESSMENT — EXTERNAL EXAM - LEFT EYE: OS_EXAM: PROPTOSIS

## 2019-06-10 NOTE — NURSING NOTE
Chief Complaint(s) and History of Present Illness(es)     Follow Up     In both eyes (Thyroid eye disease).  Charactertized as  blurred vision.  Since onset it is stable.  Associated symptoms include tearing.  Negative for double vision, eye pain, redness, flashes, floaters, glare and haloes.              Comments     Patient states she needs a refill on her Valtrex.     MADELEINE Navarro 6/10/2019 1:37 PM

## 2019-06-10 NOTE — PROGRESS NOTES
Assessment & Plan     Tonya Rodriguez is a 63 year old female with the following diagnoses:   1. Thyroid eye disease         Tonya Rodriguez is a 63 year old female with a history of BRIDGET and MS who presents to receive a medical letter indicating that her vision is adequate to drive in the [facility]. Last visit was in BRIDGET clinic 3 months ago. At that time we recommended continuing methimazole, follow up with endocrine and follow up in BRIDGET clinic in 6 months. Today, patient notes some blurry vision that has not changed since last time.     Examination today showed a visual acuity was 20/40 in the right eye and 20/200 in the left eye. Intraocular pressure was 21 in the right eye and 18 in the left eye. Pupils reacted briskly in both eyes. Ishihara color plates were 11/11 in the right eye and 11/11 in the left eye. Slit lamp exam showed some PEEs. [place] base 106 was 22 mm right eye 24 left eye. 's License Kinetic & Static Visual Fields were conducted. It showed an infero-temporal visual field defect in the right eye.     In summary, Tonya Rodriguez is a 63 year old female who presents for follow up to receive a medical letter indicating that she can drive in the [facility].  Her visual acuity is correctable to 20/30 in the RIGHT eye.  Her visual field shows enough visual field horizontally to drive in the [facility].     Return to BRIDGET clinic in September . If symptoms worsen or change, please do not hesitate to return earlier.     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  She was given this by Dr. Ankur Bridges.  He is coming back to [facility] in August.  Will give her a 4 months supply of valtrex until she can see cornea again.             Attending Physician Attestation:  Complete documentation of historical and exam elements from today's encounter can be found in the full encounter summary report (not reduplicated in this progress note).   I personally obtained the chief complaint(s) and history of present illness.  I confirmed and edited as necessary the review of systems, past medical/surgical history, family history, social history, and examination findings as documented by others; and I examined the patient myself.  I personally reviewed the relevant tests, images, and reports as documented above.  I formulated and edited as necessary the assessment and plan and discussed the findings and management plan with the patient and family. - Diego Goss MS4  6/10/2019 at 2:21 PM

## 2019-07-08 ENCOUNTER — TELEPHONE (OUTPATIENT)
Dept: NEUROLOGY | Facility: CLINIC | Age: 64
End: 2019-07-08

## 2019-07-08 NOTE — LETTER
[facility]  [address] [address]  [phone]          Stacia 10, 2019         and Vehicle Services  [address] [place] [address] [address]  [place] [address]       Patient: Tonya Rodriguez  [address] [facility] [address]  : 1955         To Whom It May Concern:     This patient is currently followed in the Neurology Clinic at [facility]. Based on my initial examination, it is my medical opinion that, with regard to her Multiple Sclerosis, this patient is safe to drive a motor vehicle in the [facility].    Please feel free to contact my office with any questions or concerns.          Sincerely,       Marci Ward MD  Neurology

## 2019-07-08 NOTE — TELEPHONE ENCOUNTER
"Ms. Rodriguez dropped off a letter she'd received from the [place] [employer].  It's asking for a letter from Dr. Ward addressing Ms. Rodriguez' MS as it relates to her ability to drive.  I see we received a similar letter in May of this year and felt the concern was the result of her vision problems.  She saw Dr. Oscar and he sent a letter clearing her to drive-- with regard to her vision.  However, this letter states they received \"incomplete information\" to a previously request and appear to be asking specifically about her MS as it relates to her ability to drive.      Ms. Rodriguez was last seen by Dr. Ward 3/13/19 and advised to follow up in September.  As yet no appointment has been scheduled.      Based on Dr. Ward' comment in phone note dated 5/2/19: \"Based on my initial exam, Tonya is a little slow but otherwise I do not see any reason (from an MS standpoint) that she shouldn't be driving\", and on the letter from Dr. Oscar's office in June, I've drafted a letter for Dr. Ward' review, if she feels comfortable providing an opinion on this.  "

## 2019-07-09 NOTE — TELEPHONE ENCOUNTER
Letter submitted via the [employer] Website.  Confirmation number is [id number].    Patient was advised by [facility].

## 2019-07-11 DIAGNOSIS — E05.90 HYPERTHYROIDISM: ICD-10-CM

## 2019-07-11 RX ORDER — METHIMAZOLE 10 MG/1
TABLET ORAL
Qty: 225 TABLET | Refills: 0 | Status: SHIPPED | OUTPATIENT
Start: 2019-07-11 | End: 2019-10-01

## 2019-07-11 NOTE — TELEPHONE ENCOUNTER
Routing refill request to provider for review/approval because:  Labs out of range:  TSH    Please review and authorize if appropriate.    Thank you,   Oneal JOHNS RN

## 2019-07-11 NOTE — TELEPHONE ENCOUNTER
"Last Written Prescription Date:  3/16/19  Last Fill Quantity: 60 tablet,  # refills: 5   Last office visit: 2/13/2019 with prescribing provider:  Praveen   Future Office Visit:      Requested Prescriptions   Pending Prescriptions Disp Refills     methimazole (TAPAZOLE) 10 MG tablet 60 tablet 5     Sig: Take 2-tablets (20 mg) by mouth daily as directed       Anti-Thyroid Agents Protocol Failed - 7/11/2019  8:57 AM        Failed - Refills for this medication group require provider approval        Failed - Normal TSH in past 12 months     Recent Labs   Lab Test 05/09/19  1510   TSH <0.01*              Passed - CBC is on file within the past 12 months     Recent Labs   Lab Test 04/12/19  1933   WBC 10.7   RBC 4.99   HGB 14.1   HCT 44.1                    Passed - Recent (12 mo) or future (30 days) visit within the authorizing provider's specialty     Patient had office visit in the last 12 months or has a visit in the next 30 days with authorizing provider or within the authorizing provider's specialty.  See \"Patient Info\" tab in inbasket, or \"Choose Columns\" in Meds & Orders section of the refill encounter.              Passed - Medication is active on the patient's med list        Passed - Patient is age 18 or older        Passed - No active pregnancy on record        Passed - No positive pregnancy test in past 12 months          "

## 2019-07-18 ENCOUNTER — TELEPHONE (OUTPATIENT)
Dept: FAMILY MEDICINE | Facility: CLINIC | Age: 64
End: 2019-07-18

## 2019-07-18 ENCOUNTER — MEDICAL CORRESPONDENCE (OUTPATIENT)
Dept: HEALTH INFORMATION MANAGEMENT | Facility: CLINIC | Age: 64
End: 2019-07-18

## 2019-07-18 DIAGNOSIS — G35 MULTIPLE SCLEROSIS (H): ICD-10-CM

## 2019-07-18 DIAGNOSIS — Z87.448: ICD-10-CM

## 2019-07-18 NOTE — TELEPHONE ENCOUNTER
Faxed to the [facility] [phone] faxed. Left message to spouse armin.    Caterina Craven on 7/18/2019 at 3:13 PM

## 2019-07-18 NOTE — TELEPHONE ENCOUNTER
Reason for Call: Request for an order or referral:    Order or referral being requested: Pt's spouse Danielito calling.   Pt has MS and she is needing an Rx for catheters sent to [facility].  Their # is:    [phone].  Please call Danielito and advise.      Date needed: at your convenience    Has the patient been seen by the PCP for this problem? NO    Additional comments:     Phone number Patient's spouse can be reached at:  Cell number on file:    Telephone Information:   Mobile [phone]     Best Time:  any    Can we leave a detailed message on this number?  YES    Call taken on 7/18/2019 at 8:33 AM by Sheri Zaldivar

## 2019-07-18 NOTE — TELEPHONE ENCOUNTER
Provider covering for Dr. Cabrera,     Patient needing catheters.  I have pended the DME for your signature. Lynda PONCE RN

## 2019-07-19 ENCOUNTER — TELEPHONE (OUTPATIENT)
Dept: FAMILY MEDICINE | Facility: CLINIC | Age: 64
End: 2019-07-19

## 2019-07-24 ENCOUNTER — MEDICAL CORRESPONDENCE (OUTPATIENT)
Dept: HEALTH INFORMATION MANAGEMENT | Facility: CLINIC | Age: 64
End: 2019-07-24

## 2019-07-24 ENCOUNTER — TELEPHONE (OUTPATIENT)
Dept: FAMILY MEDICINE | Facility: CLINIC | Age: 64
End: 2019-07-24

## 2019-07-24 NOTE — TELEPHONE ENCOUNTER
Prescription for medical supplies signed and faxed back.  Caterina Craven on 7/24/2019 at 9:39 AM

## 2019-09-24 ENCOUNTER — APPOINTMENT (OUTPATIENT)
Dept: GENERAL RADIOLOGY | Facility: CLINIC | Age: 64
End: 2019-09-24
Attending: PHYSICIAN ASSISTANT
Payer: COMMERCIAL

## 2019-09-24 ENCOUNTER — HOSPITAL ENCOUNTER (EMERGENCY)
Facility: CLINIC | Age: 64
Discharge: HOME | End: 2019-09-24
Attending: PHYSICIAN ASSISTANT | Admitting: PHYSICIAN ASSISTANT
Payer: COMMERCIAL

## 2019-09-24 VITALS
BODY MASS INDEX: 19.99 KG/M2 | SYSTOLIC BLOOD PRESSURE: 144 MMHG | RESPIRATION RATE: 20 BRPM | OXYGEN SATURATION: 94 % | DIASTOLIC BLOOD PRESSURE: 91 MMHG | WEIGHT: 135 LBS | HEART RATE: 76 BPM | HEIGHT: 69 IN | TEMPERATURE: 97.1 F

## 2019-09-24 DIAGNOSIS — R53.1 GENERALIZED WEAKNESS: ICD-10-CM

## 2019-09-24 DIAGNOSIS — J20.9 ACUTE BRONCHITIS: ICD-10-CM

## 2019-09-24 LAB
ALBUMIN SERPL-MCNC: 3.9 G/DL (ref 3.4–5)
ALBUMIN UR-MCNC: NEGATIVE MG/DL
ALP SERPL-CCNC: 167 U/L (ref 40–150)
ALT SERPL W P-5'-P-CCNC: 13 U/L (ref 0–50)
ANION GAP SERPL CALCULATED.3IONS-SCNC: 7 MMOL/L (ref 3–14)
APPEARANCE UR: ABNORMAL
AST SERPL W P-5'-P-CCNC: 15 U/L (ref 0–45)
BACTERIA #/AREA URNS HPF: ABNORMAL /HPF
BASOPHILS # BLD AUTO: 0.1 10E9/L (ref 0–0.2)
BASOPHILS NFR BLD AUTO: 0.7 %
BILIRUB SERPL-MCNC: 0.5 MG/DL (ref 0.2–1.3)
BILIRUB UR QL STRIP: NEGATIVE
BUN SERPL-MCNC: 29 MG/DL (ref 7–30)
CALCIUM SERPL-MCNC: 9.7 MG/DL (ref 8.5–10.1)
CHLORIDE SERPL-SCNC: 108 MMOL/L (ref 94–109)
CO2 SERPL-SCNC: 26 MMOL/L (ref 20–32)
COLOR UR AUTO: YELLOW
CREAT SERPL-MCNC: 0.76 MG/DL (ref 0.52–1.04)
DIFFERENTIAL METHOD BLD: ABNORMAL
EOSINOPHIL # BLD AUTO: 0.2 10E9/L (ref 0–0.7)
EOSINOPHIL NFR BLD AUTO: 1.7 %
ERYTHROCYTE [DISTWIDTH] IN BLOOD BY AUTOMATED COUNT: 12.3 % (ref 10–15)
GFR SERPL CREATININE-BSD FRML MDRD: 82 ML/MIN/{1.73_M2}
GLUCOSE SERPL-MCNC: 96 MG/DL (ref 70–99)
GLUCOSE UR STRIP-MCNC: NEGATIVE MG/DL
HCT VFR BLD AUTO: 47.5 % (ref 35–47)
HGB BLD-MCNC: 15.8 G/DL (ref 11.7–15.7)
HGB UR QL STRIP: NEGATIVE
IMM GRANULOCYTES # BLD: 0 10E9/L (ref 0–0.4)
IMM GRANULOCYTES NFR BLD: 0.3 %
KETONES UR STRIP-MCNC: NEGATIVE MG/DL
LACTATE BLD-SCNC: 1.3 MMOL/L (ref 0.7–2)
LEUKOCYTE ESTERASE UR QL STRIP: NEGATIVE
LYMPHOCYTES # BLD AUTO: 3.4 10E9/L (ref 0.8–5.3)
LYMPHOCYTES NFR BLD AUTO: 30.9 %
MCH RBC QN AUTO: 29.5 PG (ref 26.5–33)
MCHC RBC AUTO-ENTMCNC: 33.3 G/DL (ref 31.5–36.5)
MCV RBC AUTO: 89 FL (ref 78–100)
MONOCYTES # BLD AUTO: 0.5 10E9/L (ref 0–1.3)
MONOCYTES NFR BLD AUTO: 4.9 %
MUCOUS THREADS #/AREA URNS LPF: PRESENT /LPF
NEUTROPHILS # BLD AUTO: 6.7 10E9/L (ref 1.6–8.3)
NEUTROPHILS NFR BLD AUTO: 61.5 %
NITRATE UR QL: POSITIVE
NRBC # BLD AUTO: 0 10*3/UL
NRBC BLD AUTO-RTO: 0 /100
PH UR STRIP: 6 PH (ref 5–7)
PLATELET # BLD AUTO: 467 10E9/L (ref 150–450)
POTASSIUM SERPL-SCNC: 3.9 MMOL/L (ref 3.4–5.3)
PROT SERPL-MCNC: 8 G/DL (ref 6.8–8.8)
RBC # BLD AUTO: 5.35 10E12/L (ref 3.8–5.2)
RBC #/AREA URNS AUTO: 1 /HPF (ref 0–2)
SODIUM SERPL-SCNC: 141 MMOL/L (ref 133–144)
SOURCE: ABNORMAL
SP GR UR STRIP: 1.02 (ref 1–1.03)
SQUAMOUS #/AREA URNS AUTO: 1 /HPF (ref 0–1)
TROPONIN I SERPL-MCNC: <0.015 UG/L (ref 0–0.04)
UROBILINOGEN UR STRIP-MCNC: 4 MG/DL (ref 0–2)
WBC # BLD AUTO: 10.9 10E9/L (ref 4–11)
WBC #/AREA URNS AUTO: 2 /HPF (ref 0–5)

## 2019-09-24 PROCEDURE — 25000128 H RX IP 250 OP 636: Performed by: PHYSICIAN ASSISTANT

## 2019-09-24 PROCEDURE — 87086 URINE CULTURE/COLONY COUNT: CPT | Performed by: PHYSICIAN ASSISTANT

## 2019-09-24 PROCEDURE — 85025 COMPLETE CBC W/AUTO DIFF WBC: CPT | Performed by: PHYSICIAN ASSISTANT

## 2019-09-24 PROCEDURE — 71046 X-RAY EXAM CHEST 2 VIEWS: CPT

## 2019-09-24 PROCEDURE — 93005 ELECTROCARDIOGRAM TRACING: CPT | Performed by: PHYSICIAN ASSISTANT

## 2019-09-24 PROCEDURE — 99285 EMERGENCY DEPT VISIT HI MDM: CPT | Mod: 25 | Performed by: PHYSICIAN ASSISTANT

## 2019-09-24 PROCEDURE — 93010 ELECTROCARDIOGRAM REPORT: CPT | Mod: Z6 | Performed by: PHYSICIAN ASSISTANT

## 2019-09-24 PROCEDURE — 83605 ASSAY OF LACTIC ACID: CPT | Performed by: PHYSICIAN ASSISTANT

## 2019-09-24 PROCEDURE — 87088 URINE BACTERIA CULTURE: CPT | Performed by: PHYSICIAN ASSISTANT

## 2019-09-24 PROCEDURE — 87186 SC STD MICRODIL/AGAR DIL: CPT | Performed by: PHYSICIAN ASSISTANT

## 2019-09-24 PROCEDURE — 96361 HYDRATE IV INFUSION ADD-ON: CPT | Performed by: PHYSICIAN ASSISTANT

## 2019-09-24 PROCEDURE — 81001 URINALYSIS AUTO W/SCOPE: CPT | Performed by: PHYSICIAN ASSISTANT

## 2019-09-24 PROCEDURE — 96360 HYDRATION IV INFUSION INIT: CPT | Performed by: PHYSICIAN ASSISTANT

## 2019-09-24 PROCEDURE — 84484 ASSAY OF TROPONIN QUANT: CPT | Performed by: PHYSICIAN ASSISTANT

## 2019-09-24 PROCEDURE — 80053 COMPREHEN METABOLIC PANEL: CPT | Performed by: PHYSICIAN ASSISTANT

## 2019-09-24 RX ORDER — OXYBUTYNIN CHLORIDE 15 MG/1
2 TABLET, EXTENDED RELEASE ORAL AT BEDTIME
Refills: 3 | COMMUNITY
Start: 2019-09-15 | End: 2020-04-22

## 2019-09-24 RX ORDER — ALBUTEROL SULFATE 90 UG/1
2 AEROSOL, METERED RESPIRATORY (INHALATION) EVERY 6 HOURS PRN
Qty: 1 INHALER | Refills: 0 | Status: SHIPPED | OUTPATIENT
Start: 2019-09-24 | End: 2020-04-22

## 2019-09-24 RX ORDER — PREDNISONE 20 MG/1
TABLET ORAL
Qty: 10 TABLET | Refills: 0 | Status: SHIPPED | OUTPATIENT
Start: 2019-09-24 | End: 2020-04-22

## 2019-09-24 RX ADMIN — SODIUM CHLORIDE 1000 ML: 9 INJECTION, SOLUTION INTRAVENOUS at 15:30

## 2019-09-24 ASSESSMENT — MIFFLIN-ST. JEOR: SCORE: 1226.74

## 2019-09-24 NOTE — ED AVS SNAPSHOT
[facility] Emergency Department  [address] [address]  Phone:  [phone]  Fax:  [phone]                                    Tonya Rodriguez   MRN: [id number]    Department:  [facility] Emergency Department   Date of Visit:  9/24/2019           After Visit Summary Signature Page    I have received my discharge instructions, and my questions have been answered. I have discussed any challenges I see with this plan with the nurse or doctor.    ..........................................................................................................................................  Patient/Patient Representative Signature      ..........................................................................................................................................  Patient Representative Print Name and Relationship to Patient    ..................................................               ................................................  Date                                   Time    ..........................................................................................................................................  Reviewed by Signature/Title    ...................................................              ..............................................  Date                                               Time          22EPIC Rev 08/18

## 2019-09-24 NOTE — ED NOTES
Had patient walk and monitored with portable oxymetry patient did well, patient 02 levels stayed between 91% and 95 % and heart rate stayed around 90's patient denied any weakness or dizziness.

## 2019-09-24 NOTE — ED NOTES
Pt presents to ED for complaint of cough and generalized weakness progressing over the past 3 days. She denies SOA, nausea, vomiting, diarrhea, CP, or abd pain. Cough is congested but non-productive. Pt self-cath's due to MS, she denies Urinary sx however.

## 2019-09-24 NOTE — ED PROVIDER NOTES
[facility]  History     Chief Complaint   Patient presents with     Cough     cough for couple weeks, getting weaker     Shortness of Breath     Generalized Weakness     HPI  Tonya Rodriguez is a 64 year old female with history of multiple sclerosis who presents with complaints of worsening productive cough over the past 2 weeks.  Patient states over the past 2 days she has felt more generally weak, short of breath, and experiencing a worsening cough that is becoming more congested, but she is unable to cough up any sputum.  Patient also complains of associated subjective fevers, chills, and fatigue.  Denies headache, rash, neck pain/stiffness, sore throat, sinus pressure, nasal congestion, nausea, vomiting, diarrhea, abdominal pain, chest pain, urinary symptoms, or leg pain/swelling.  Patient reports being diagnosed with emphysema nearly 10 years ago.  She continues to be a smoker.  Patient self catheterizes herself but denies any cloudy appearing urine.  Patient states she typically gets around with a walker and has been needing to use it more due to her generalized weakness.       Allergies:  Allergies   Allergen Reactions     Cipro [Ciprofloxacin] Rash     gets yeast infections - BAD with.     Lactulose GI Disturbance     Caused Vomiting       Problem List:    Patient Active Problem List    Diagnosis Date Noted     Screening for cervical cancer 05/08/2019     Priority: Medium     2007, 2008, 2010 NIL Paps  2016 NIL Pap, Neg HPV  5/2/19 NIL Pap, Neg HPV. Plan cotest in 5 years.        Graves disease 05/02/2019     Priority: Medium     Smoker 05/02/2019     Priority: Medium     Insomnia, unspecified type 01/16/2019     Priority: Medium     Self-catheterizes urinary bladder 03/05/2015     Priority: Medium     Speech dysfluency 02/08/2015     Priority: Medium     Family history of rheumatoid arthritis 02/05/2015     Priority: Medium     Urinary incontinence 01/15/2014     Priority: Medium     Restless leg 01/15/2014      Priority: Medium     Herpes keratitis 01/15/2014     Priority: Medium     Advanced directives, counseling/discussion 01/15/2014     Priority: Medium     Major depression in complete remission (H) 09/16/2013     Priority: Medium     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 Provider to review and confirm.       Dysphagia 06/16/2009     Priority: Medium     Apparently neuromuscular -related to MS       Allergic rhinitis 06/19/2005     Priority: Medium     Other chronic allergic conjunctivitis 06/19/2005     Priority: Medium     Multiple sclerosis (H) 06/10/2005     Priority: Medium        Past Medical History:    Past Medical History:   Diagnosis Date     Chest pain, unspecified 3/22/04     Herpes simplex with other ophthalmic complications      Lipoma of unspecified site      Migraine, unspecified, with intractable migraine, so stated, without mention of status migrainosus 1/20/2007       Past Surgical History:    Past Surgical History:   Procedure Laterality Date     APPENDECTOMY  1980's    Retained suture     ARTHROPLASTY HIP  9/19/2012    Procedure: ARTHROPLASTY HIP;  Right Total Hip Minimally Invasive Surgery;  Surgeon: Devendra Douglas MD;  Location: [facility] OR     ARTHROSCOPY KNEE RT/LT  1989    Arthroscopy of the Left Knee     BUNIONECTOMY RT/LT      Bilateral bunionectomies x4 surgeries     DILATION AND CURETTAGE, OPERATIVE HYSTEROSCOPY WITH MORCELLATOR, COMBINED N/A 5/15/2019    Procedure: DILATION AND CURETTAGE,Hysteroscopy;  Surgeon: Lauren Moise MD;  Location: [facility] OR     EXCISE LESION TRUNK N/A 4/17/2015    Procedure: EXCISE LESION TRUNK;  Surgeon: Wander Gutierrez MD;  Location: [facility] OR     SURGICAL HISTORY OF -   1974, 1977    two normal deliveries     TUBAL LIGATION  1981-82       Family  "History:    Family History   Problem Relation Age of Onset     Hypertension Mother      Heart Disease Mother      Migraines Mother      Cancer Father         lung     Heart Disease Father      Diabetes Father      Heart Disease Maternal Grandfather      Cancer Sister         cervical     Heart Disease Son         mummer     Alcohol/Drug Brother      Genitourinary Problems Brother         stones     Coronary Artery Disease Brother         injury \" maker\" tree fell on him     Glaucoma No family hx of      Macular Degeneration No family hx of      Aneurysm No family hx of      Brain Hemorrhage No family hx of      Seizure Disorder No family hx of      Cerebrovascular Disease No family hx of      Depression No family hx of        Social History:  Marital Status:   [2]  Social History     Tobacco Use     Smoking status: Current Every Day Smoker     Packs/day: 1.00     Years: 45.00     Pack years: 45.00     Types: Cigarettes     Smokeless tobacco: Never Used   Substance Use Topics     Alcohol use: No     Drug use: No        Medications:    albuterol (PROAIR HFA/PROVENTIL HFA/VENTOLIN HFA) 108 (90 Base) MCG/ACT inhaler  gabapentin (NEURONTIN) 100 MG capsule  imipramine (TOFRANIL) 50 MG tablet  methimazole (TAPAZOLE) 10 MG tablet  metoprolol succinate ER (TOPROL-XL) 25 MG 24 hr tablet  order for DME  oxybutynin ER (DITROPAN XL) 15 MG 24 hr tablet  predniSONE (DELTASONE) 20 MG tablet  simvastatin (ZOCOR) 20 MG tablet  traZODone (DESYREL) 50 MG tablet  valACYclovir (VALTREX) 500 MG tablet  nitroFURantoin macrocrystal-monohydrate (MACROBID) 100 MG capsule          Review of Systems   Constitutional: Positive for chills, fatigue and fever.   HENT: Negative.    Respiratory: Positive for cough and shortness of breath.    Cardiovascular: Negative.    Musculoskeletal: Negative.    Skin: Negative.    Neurological: Positive for weakness (generalized).   All other systems reviewed and are negative.      Physical Exam   BP: " "135/84  Pulse: 71  Heart Rate: 109  Temp: 97.1  F (36.2  C)  Resp: 20  Height: 175.3 cm (5' 9\")  Weight: 61.2 kg (135 lb)  SpO2: 97 %      Physical Exam  Constitutional:       General: She is not in acute distress.     Appearance: She is well-developed. She is not ill-appearing or toxic-appearing.   HENT:      Head: Normocephalic and atraumatic.      Right Ear: Hearing, tympanic membrane, ear canal and external ear normal.      Left Ear: Hearing, tympanic membrane, ear canal and external ear normal.      Nose: Nose normal. No mucosal edema, congestion or rhinorrhea.      Mouth/Throat:      Mouth: Mucous membranes are dry.      Pharynx: Oropharynx is clear. Uvula midline. No oropharyngeal exudate, posterior oropharyngeal erythema or uvula swelling.      Tonsils: No tonsillar exudate or tonsillar abscesses.   Eyes:      Conjunctiva/sclera: Conjunctivae normal.      Pupils: Pupils are equal, round, and reactive to light.   Neck:      Musculoskeletal: Full passive range of motion without pain, normal range of motion and neck supple. Normal range of motion. No neck rigidity.      Meningeal: Brudzinski's sign and Kernig's sign absent.   Cardiovascular:      Rate and Rhythm: Normal rate and regular rhythm.      Heart sounds: Normal heart sounds.   Pulmonary:      Effort: Pulmonary effort is normal. No respiratory distress.      Breath sounds: Normal breath sounds. No stridor, decreased air movement or transmitted upper airway sounds. No decreased breath sounds, wheezing, rhonchi or rales.   Abdominal:      General: There is no distension.      Palpations: Abdomen is soft.      Tenderness: There is no tenderness. There is no guarding or rebound.   Musculoskeletal: Normal range of motion.      Right lower leg: She exhibits no tenderness. No edema.      Left lower leg: She exhibits no tenderness. No edema.   Lymphadenopathy:      Cervical: No cervical adenopathy.   Skin:     General: Skin is warm and dry.      Findings: No " rash.   Neurological:      General: No focal deficit present.      Mental Status: She is alert and oriented to person, place, and time.      Cranial Nerves: No cranial nerve deficit.      Motor: No weakness.   Psychiatric:         Mood and Affect: Mood normal.         ED Course        Procedures               EKG Interpretation:      Interpreted by Olivia Ruiz PA-C and Dr. Smart  Time reviewed: 1530  Symptoms at time of EKG: Generalized weakness   Rhythm: Normal sinus   Rate: Normal  Axis: Left Axis Deviation  Ectopy: None  Conduction: Normal  ST Segments/ T Waves: No acute ischemic changes  Q Waves: III  Comparison to prior: Unchanged from 5/15/19    Clinical Impression: No acute changes      No results found for this or any previous visit (from the past 24 hour(s)).      Results for orders placed or performed during the hospital encounter of 09/24/19   XR Chest 2 Views    Narrative    CHEST TWO VIEWS   9/24/2019 4:32 PM     HISTORY: Worsening cough.    COMPARISON: 11/26/2016.      Impression    IMPRESSION: No acute cardiopulmonary disease.    JANNA HOLCOMB MD   CBC with platelets differential   Result Value Ref Range    WBC 10.9 4.0 - 11.0 10e9/L    RBC Count 5.35 (H) 3.8 - 5.2 10e12/L    Hemoglobin 15.8 (H) 11.7 - 15.7 g/dL    Hematocrit 47.5 (H) 35.0 - 47.0 %    MCV 89 78 - 100 fl    MCH 29.5 26.5 - 33.0 pg    MCHC 33.3 31.5 - 36.5 g/dL    RDW 12.3 10.0 - 15.0 %    Platelet Count 467 (H) 150 - 450 10e9/L    Diff Method Automated Method     % Neutrophils 61.5 %    % Lymphocytes 30.9 %    % Monocytes 4.9 %    % Eosinophils 1.7 %    % Basophils 0.7 %    % Immature Granulocytes 0.3 %    Nucleated RBCs 0 0 /100    Absolute Neutrophil 6.7 1.6 - 8.3 10e9/L    Absolute Lymphocytes 3.4 0.8 - 5.3 10e9/L    Absolute Monocytes 0.5 0.0 - 1.3 10e9/L    Absolute Eosinophils 0.2 0.0 - 0.7 10e9/L    Absolute Basophils 0.1 0.0 - 0.2 10e9/L    Abs Immature Granulocytes 0.0 0 - 0.4 10e9/L    Absolute Nucleated RBC 0.0    Lactic  acid whole blood   Result Value Ref Range    Lactic Acid 1.3 0.7 - 2.0 mmol/L   Comprehensive metabolic panel   Result Value Ref Range    Sodium 141 133 - 144 mmol/L    Potassium 3.9 3.4 - 5.3 mmol/L    Chloride 108 94 - 109 mmol/L    Carbon Dioxide 26 20 - 32 mmol/L    Anion Gap 7 3 - 14 mmol/L    Glucose 96 70 - 99 mg/dL    Urea Nitrogen 29 7 - 30 mg/dL    Creatinine 0.76 0.52 - 1.04 mg/dL    GFR Estimate 82 >60 mL/min/[1.73_m2]    GFR Estimate If Black >90 >60 mL/min/[1.73_m2]    Calcium 9.7 8.5 - 10.1 mg/dL    Bilirubin Total 0.5 0.2 - 1.3 mg/dL    Albumin 3.9 3.4 - 5.0 g/dL    Protein Total 8.0 6.8 - 8.8 g/dL    Alkaline Phosphatase 167 (H) 40 - 150 U/L    ALT 13 0 - 50 U/L    AST 15 0 - 45 U/L   Troponin I   Result Value Ref Range    Troponin I ES <0.015 0.000 - 0.045 ug/L   UA reflex to Microscopic   Result Value Ref Range    Color Urine Yellow     Appearance Urine Slightly Cloudy     Glucose Urine Negative NEG^Negative mg/dL    Bilirubin Urine Negative NEG^Negative    Ketones Urine Negative NEG^Negative mg/dL    Specific Gravity Urine 1.019 1.003 - 1.035    Blood Urine Negative NEG^Negative    pH Urine 6.0 5.0 - 7.0 pH    Protein Albumin Urine Negative NEG^Negative mg/dL    Urobilinogen mg/dL 4.0 (H) 0.0 - 2.0 mg/dL    Nitrite Urine Positive (A) NEG^Negative    Leukocyte Esterase Urine Negative NEG^Negative    Source Catheterized Urine     RBC Urine 1 0 - 2 /HPF    WBC Urine 2 0 - 5 /HPF    Bacteria Urine Few (A) NEG^Negative /HPF    Squamous Epithelial /HPF Urine 1 0 - 1 /HPF    Mucous Urine Present (A) NEG^Negative /LPF   Urine Culture   Result Value Ref Range    Specimen Description Midstream Urine     Special Requests Specimen received in preservative     Culture Micro (A)      >100,000 colonies/mL  Escherichia coli  Susceptibility testing in progress         Medications   0.9% sodium chloride BOLUS (0 mLs Intravenous Stopped 9/24/19 7337)       Assessments & Plan (with Medical Decision Making)     Pt is  a 64 year old female with history of multiple sclerosis who presents with complaints of worsening productive cough over the past 2 weeks.  Patient states over the past 2 days she has felt more generally weak, short of breath, and experiencing a worsening cough that is becoming more congested, but she is unable to cough up any sputum.  Patient also complains of associated subjective fevers, chills, and fatigue.  Patient reports being diagnosed with emphysema nearly 10 years ago.  She continues to be a smoker.  Patient self catheterizes herself but denies any cloudy appearing urine.  Patient states she typically gets around with a walker and has been needing to use it more due to her generalized weakness.  Pt is afebrile on arrival.  Exam as above.  No acute ischemic changes noted on EKG.  Troponin is undetectable.  Chest x-ray was negative for pneumonia or acute cardiopulmonary disease.  Lactic acid is not elevated.  No leukocytosis.  Electrolytes and kidney function are normal.  Urine culture is also pending given report of generalized weakness in the context of patient who self catheterizes.  Discussed results with patient.  Patient has ambulated throughout the department with the assist of her walker without difficulties.  She denies any associated shortness of breath.  No noted hypoxia with ambulation.  Encouraged smoking cessation.  Suspect possible bronchitis infection.  Will treat with Albuterol inhaler and Prednisone given report of emphysema history as these have helped with her bronchitis infections in the past.  Encouraged symptomatic treatments at home.  Patient states she feels well to discharge home.  Return precautions were reviewed.  Hand-outs were provided.    Patient was sent with Albuterol inhaler and Prednisone burst and was instructed to follow-up with PCP in 3-5 days for re-check and for continued care and management or sooner if new or worsening symptoms.  She is to return to the ED for  persistent and/or worsening symptoms.  Patient expressed understanding of the diagnosis and plan and was discharged home in good condition.    I have reviewed the nursing notes.    I have reviewed the findings, diagnosis, plan and need for follow up with the patient.    Discharge Medication List as of 9/24/2019  6:03 PM      START taking these medications    Details   albuterol (PROAIR HFA/PROVENTIL HFA/VENTOLIN HFA) 108 (90 Base) MCG/ACT inhaler Inhale 2 puffs into the lungs every 6 hours as needed for shortness of breath / dyspnea or wheezing, Disp-1 Inhaler, R-0, E-Prescribe      predniSONE (DELTASONE) 20 MG tablet Take two tablets (= 40mg) each day for 5 (five) days, Disp-10 tablet, R-0, E-Prescribe             Final diagnoses:   Acute bronchitis   Generalized weakness       9/24/2019   [facility] EMERGENCY DEPARTMENT      Disclaimer:  This note consists of symbols derived from keyboarding, dictation and/or voice recognition software.  As a result, there may be errors in the script that have gone undetected.  Please consider this when interpreting information found in this chart.     Olivia Ruiz PA-C  09/25/19 2011

## 2019-09-25 ENCOUNTER — TELEPHONE (OUTPATIENT)
Dept: EMERGENCY MEDICINE | Facility: CLINIC | Age: 64
End: 2019-09-25

## 2019-09-25 DIAGNOSIS — N39.0 URINARY TRACT INFECTION: ICD-10-CM

## 2019-09-25 LAB
BACTERIA SPEC CULT: ABNORMAL
Lab: ABNORMAL
SPECIMEN SOURCE: ABNORMAL

## 2019-09-25 RX ORDER — NITROFURANTOIN 25; 75 MG/1; MG/1
100 CAPSULE ORAL 2 TIMES DAILY
Qty: 10 CAPSULE | Refills: 0 | Status: SHIPPED | OUTPATIENT
Start: 2019-09-25 | End: 2019-09-30

## 2019-09-25 ASSESSMENT — ENCOUNTER SYMPTOMS
FEVER: 1
CARDIOVASCULAR NEGATIVE: 1
SHORTNESS OF BREATH: 1
COUGH: 1
MUSCULOSKELETAL NEGATIVE: 1
CHILLS: 1
FATIGUE: 1
WEAKNESS: 1

## 2019-09-25 NOTE — TELEPHONE ENCOUNTER
[facility] Emergency Department Lab result notification [Adult-Female]    [place] ED lab result protocol used  Urine culture    Reason for call  Notify of lab results, assess symptoms,  review ED providers recommendations/discharge instructions (if necessary) and advise per ED lab result f/u protocol    Lab Result (including Rx patient on, if applicable)  Preliminary urine culture report on 9/25/19 shows the presence of bacteria(s):  >100,000 colonies/mL Escherichia coli  Emergency Dept/Urgent Care discharge antibiotic: None  Recommendations per [place] ED Lab result protocol - Urine culture protocol.    Information table from ED Provider visit on 9/24/19  Symptoms reported at ED visit (Chief complaint, HPI) Cough        cough for couple weeks, getting weaker     Shortness of Breath     Generalized Weakness      HPI  Tonya Rodriguez is a 64 year old female with history of multiple sclerosis who presents with complaints of worsening productive cough over the past 2 weeks.  Patient states over the past 2 days she has felt more generally weak, short of breath, and experiencing a worsening cough that is becoming more congested, but she is unable to cough up any sputum.  Patient also complains of associated subjective fevers, chills, and fatigue.  Denies headache, rash, neck pain/stiffness, sore throat, sinus pressure, nasal congestion, nausea, vomiting, diarrhea, abdominal pain, chest pain, urinary symptoms, or leg pain/swelling.  Patient reports being diagnosed with emphysema nearly 10 years ago.  She continues to be a smoker.  Patient self catheterizes herself but denies any cloudy appearing urine.  Patient states she typically gets around with a walker and has been needing to use it more due to her generalized weakness.     Significant Medical hx, if applicable (i.e. CKD, diabetes) MS, self catheterizes urinary bladder   Allergies Allergies   Allergen Reactions     Cipro [Ciprofloxacin] Rash      gets yeast infections - BAD with.     Lactulose GI Disturbance     Caused Vomiting      Weight, if applicable Wt Readings from Last 2 Encounters:   09/24/19 61.2 kg (135 lb)   05/15/19 62.6 kg (138 lb)      Coumadin/Warfarin [Yes /No] No   Creatinine Level (mg/dl) Creatinine   Date Value Ref Range Status   09/24/2019 0.76 0.52 - 1.04 mg/dL Final      Creatinine clearance (ml/min), if applicable Serum creatinine: 0.76 mg/dL 09/24/19 1531  Estimated creatinine clearance: 72.3 mL/min   Pregnant (Yes/No/NA) No   Breastfeeding (Yes/No/NA) No   ED providers Impression and Plan (applicable information) Pt is afebrile on arrival.  Exam as above.  No acute ischemic changes noted on EKG.  Troponin is undetectable.  Chest x-ray was negative for pneumonia or acute cardiopulmonary disease.  Lactic acid is not elevated.  No leukocytosis.  Electrolytes and kidney function are normal.  Urine culture is also pending given report of generalized weakness in the context of patient who self catheterizes.  Discussed results with patient.  Patient was ambulated without difficulties.  She denies any associated shortness of breath.  No noted hypoxia with ambulation.  Encouraged smoking cessation.  Suspect viral bronchitis infection.  Will treat with Albuterol inhaler and Prednisone given report of emphysema history.  Encouraged symptomatic treatments at home.  Patient states she feels well to discharge home.  Return precautions were reviewed.  Hand-outs were provided.     Patient was sent with Albuterol inhaler and Prednisone burst and was instructed to follow-up with PCP in 3-5 days for re-check and for continued care and management or sooner if new or worsening symptoms.  She is to return to the ED for persistent and/or worsening symptoms.  Patient expressed understanding of the diagnosis and plan and was discharged home in good condition.   ED diagnosis Acute bronchitis   Generalized weakness      ED provider Olivia Ruiz PA-C       RN Assessment (Patient s current Symptoms), include time called.  [Insert Left message here if message left]  1:51PM: Spoke with Patient. She states that she is feeling much better. Denies any urinary symptoms, states that she self caths. Her shortness of breath is better. Denies fever.     RN Recommendations/Instructions per [place] ED lab result protocol  Patient notified of lab result and treatment recommendations.  Rx for Nitrofurantoin Macrocrystal-Monohydrate (Macrobid) 100 mg PO capsule, 1 capsule (100 mg) by mouth 2 times daily for 5 days sent to [Pharmacy - [facility] in [place]].  Advised to increase fluids.     RN reviewed information about medication being prescribed based on preliminary findings in the culture and changes may be necessary when culture report finalizes.  If changes are needed, Patient will be contacted.  If no changes are necessary, no call will be placed and Patient has been advised to complete the antibiotic as prescribed today.  Patient verbalizes understanding.    Advised to follow up with PCP as directed by the ED provider.  Patient is comfortable with the plan of care and has no further questions.        Please Contact your PCP clinic or return to the Emergency department if your:    Symptoms worsen or other concerning symptom's.    PCP follow-up Questions asked: YES       [RN Name]  Alda Ulloa RN  [facility] RN  Lung Nodule and ED Lab Result RN  Epic pool (ED late result f/u RN): [id number]  [facility] INCIDENTAL RADIOLOGY F/U NURSES: [id number]  # [phone]    Copy of Lab result   Urine Culture [[id number]] (Order [id number])   Order Requisition     Urine Culture (Order #[id number]) on 9/24/19   Preliminary Result   Exam Information     Exam Date Exam Time Accession # Results    9/24/19  5:18 PM [id number]    Specimen Information: Midstream Urine        Component Collected Lab   Specimen Description 09/24/2019  5:18    Midstream Urine    Special Requests  09/24/2019  5:18    Specimen received in preservative    Culture Micro Abnormal  09/24/2019  5:18    >100,000 colonies/mL   Escherichia coli   Susceptibility testing in progress

## 2019-09-26 NOTE — RESULT ENCOUNTER NOTE
Final Urine Culture Report on 9/25/19  Emergency Dept/Urgent Care discharge antibiotic prescribed: None;  On 9/26/19 per ED lab result Protocol, initiated Rx for Nitrofurantoin Macrocrystal-Monohydrate (Macrobid) 100 mg PO capsule, 1 capsule (100 mg) by mouth 2 times daily for 5 days  #1. Bacteria, >100,000 colonies/mL Escherichia coli, is SUSCEPTIBLE to Antibiotic.    As per [facility] ED Lab Result protocol, no change in antibiotic therapy.

## 2019-10-03 ENCOUNTER — HEALTH MAINTENANCE LETTER (OUTPATIENT)
Age: 64
End: 2019-10-03

## 2019-10-08 ENCOUNTER — TELEPHONE (OUTPATIENT)
Dept: FAMILY MEDICINE | Facility: CLINIC | Age: 64
End: 2019-10-08

## 2019-10-08 NOTE — TELEPHONE ENCOUNTER
Patient is asking to have orders placed to get her Thyroid checked.    Caterina Craven on 10/8/2019 at 2:16 PM

## 2019-10-14 NOTE — TELEPHONE ENCOUNTER
'This patient is very overdue for a followup thyroid evaluation... last appointment with me 2/13/19 and was due back in 6/2019.  Since I no longer work at the [facility] clinic with the addition of Dr. Diego Figueroa/Endocrinology at the [facility], she should plan an appointment with Dr. Carolina MIRAMONTES.     Please relay message/plan to patient.  Thanks.     TREASURE Morton MD, MS   Endocrinology   [facility]

## 2019-10-14 NOTE — TELEPHONE ENCOUNTER
Patient is contacted and told of the need to see Dr. Figueroa.  I have given her the number to call and scheduled appt.  Per patient she does not need a referral. Lynda PONCE RN

## 2019-11-27 DIAGNOSIS — H17.9 LEFT CORNEAL SCAR: ICD-10-CM

## 2019-11-27 RX ORDER — VALACYCLOVIR HYDROCHLORIDE 500 MG/1
500 TABLET, FILM COATED ORAL DAILY
Qty: 30 TABLET | Refills: 0 | Status: SHIPPED | OUTPATIENT
Start: 2019-11-27 | End: 2020-02-18

## 2019-11-27 NOTE — TELEPHONE ENCOUNTER
Last Clinic Visit: 6/10/19 with magalis Jackson to have returned in 4 months, cancelled 9/23/19 appointment with Dr Og.  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  She was given this by Dr. Ankur Bridges.  He is coming back to [facility] in August.  Will give her a 4 months supply of valtrex until she can see cornea again.

## 2019-12-02 ENCOUNTER — OFFICE VISIT (OUTPATIENT)
Dept: ENDOCRINOLOGY | Facility: CLINIC | Age: 64
End: 2019-12-02
Payer: COMMERCIAL

## 2019-12-02 VITALS
RESPIRATION RATE: 16 BRPM | WEIGHT: 143.8 LBS | HEART RATE: 73 BPM | BODY MASS INDEX: 21.24 KG/M2 | DIASTOLIC BLOOD PRESSURE: 69 MMHG | OXYGEN SATURATION: 98 % | SYSTOLIC BLOOD PRESSURE: 139 MMHG

## 2019-12-02 DIAGNOSIS — F17.200 TOBACCO DEPENDENCE SYNDROME: ICD-10-CM

## 2019-12-02 DIAGNOSIS — E05.00 GRAVES' DISEASE: Primary | ICD-10-CM

## 2019-12-02 DIAGNOSIS — E05.00 GRAVES' OPHTHALMOPATHY: ICD-10-CM

## 2019-12-02 LAB
ALT SERPL W P-5'-P-CCNC: 18 U/L (ref 0–50)
AST SERPL W P-5'-P-CCNC: 14 U/L (ref 0–45)
T3FREE SERPL-MCNC: 2.7 PG/ML (ref 2.3–4.2)
T4 FREE SERPL-MCNC: 0.8 NG/DL (ref 0.76–1.46)
TSH SERPL DL<=0.005 MIU/L-ACNC: 2.11 MU/L (ref 0.4–4)

## 2019-12-02 PROCEDURE — 99214 OFFICE O/P EST MOD 30 MIN: CPT | Performed by: INTERNAL MEDICINE

## 2019-12-02 PROCEDURE — 36415 COLL VENOUS BLD VENIPUNCTURE: CPT | Performed by: INTERNAL MEDICINE

## 2019-12-02 PROCEDURE — 84450 TRANSFERASE (AST) (SGOT): CPT | Performed by: INTERNAL MEDICINE

## 2019-12-02 PROCEDURE — 84443 ASSAY THYROID STIM HORMONE: CPT | Performed by: INTERNAL MEDICINE

## 2019-12-02 PROCEDURE — 84481 FREE ASSAY (FT-3): CPT | Performed by: INTERNAL MEDICINE

## 2019-12-02 PROCEDURE — 84460 ALANINE AMINO (ALT) (SGPT): CPT | Performed by: INTERNAL MEDICINE

## 2019-12-02 PROCEDURE — 84439 ASSAY OF FREE THYROXINE: CPT | Performed by: INTERNAL MEDICINE

## 2019-12-02 RX ORDER — NICOTINE 21 MG/24HR
1 PATCH, TRANSDERMAL 24 HOURS TRANSDERMAL EVERY 24 HOURS
Qty: 28 PATCH | Refills: 11 | Status: SHIPPED | OUTPATIENT
Start: 2019-12-02 | End: 2020-04-22

## 2019-12-02 NOTE — PROGRESS NOTES
"S: Pt being seen in f/u for hyperthyroidism.  Previously seen by Dr Morton.   \"1990's. Diagnosed with multiple sclerosis (MS), clinicians wondered if present 10+yrs previous  Has lived in [facility]  Symptoms decreased vision, leg weakness and falls, speech symptoms, also incomplete bladder emptying  Neurology evaluations with Dr. MERVIN Mukherjee     Previous eye problems  ~2013. Acute problem with left eye redness  Medical evaluation at [facility], subsequent evaluation at [facility]   Saw Dr. Bray/ophthalmology  Patient recalls comment about \"herpes\" infection of the eye, treatment with eye drops left eye, also Valtrex pills  Subsequent issues with eye swelling, has seen Dr. Kush Gutierrez/[facility] Ophthalmology dept  Treatment with eye drops, also \"cold pack\" vs \"hot pack\" to both eyes 3-4x/day  9/4/18. Eye evaluation with Dr. Gutierrez, diagnoses Blepharitis, H/O Herpes keratitis, left eye:, Cornea scar, left eye, Dry eye, both eyes      Progressive symptoms included weight loss ~35#, insomnia, decreased appetite              Also tremors, faster heart rate, muscle weakness legs/arms, fatigue  10/5/18. Med evaluation with Rosalba Landa CNP/[facility]              Diagnosis of hyperthyroidism              Began hyperthyroidism treatment with methimazole and metoprolol medication\"    She is currently taking 25 mg of methimazole daily and 25 mg of metoprolol daily.     She is eating \"all the time\" as she is worried about losing weight. Up 4 pounds since last visit.     Vision is blurry. She is using lubricating drops at night. No eye pain. Feels similar to when she saw ophthalmology this summer. She smokes 3/4 PPD.     Chronic tremors which are unchanged.   Chronic constipation.     ROS: 10 point ROS neg other than the symptoms noted above in the HPI.    O:  Vital signs:      BP: 139/69 Pulse: 73   Resp: 16 SpO2: 98 %       Weight: 65.2 kg (143 lb 12.8 oz)  Estimated body mass index is 21.24 kg/m  as calculated from " "the following:    Height as of 9/24/19: 1.753 m (5' 9\").    Weight as of this encounter: 65.2 kg (143 lb 12.8 oz).  Gen: In NAD.   HEENT: +proptosis, no lid lag, EOMI, thyroid palpable w/o clear nodule.   Card: S1 S2 RRR no m/r/g.  Pulm: CTA b/l.   GI: NT ND +BS.   Ext: no LE edema.   MSK: no gross deformities.  Neuro: + tremor, +2 DTR's    A/P:   Hyperthyroidism - +TSI antibody consistent with Grave's. Options of methimazole, I 131, and surgery reviewed. Given her BRIDGET and smoking, would favor methimazole for now. She was a little hyperthyroid still in 5/2019 when labs last checked. She has BRIDGET and smokes. We reviewed the risk of worsening BRIDGET.   -Labs today.  -Rx for nicotine patches.       I spent 25 minutes with the patient. Greater than 50% of the time spent in . Risks/benefits/alternatives reviewed.     Diego Figeuroa MD on 12/2/2019 at 2:44 PM        "

## 2019-12-02 NOTE — LETTER
"    12/2/2019         RE: Tonya Rodriguez  [address] [place]  [facility] [address]        Dear Colleague,    Thank you for referring your patient, Tonya Rodriguez, to the [facility]. Please see a copy of my visit note below.    S: Pt being seen in f/u for hyperthyroidism.  Previously seen by Dr Morton.   \"1990's. Diagnosed with multiple sclerosis (MS), clinicians wondered if present 10+yrs previous  Has lived in [facility]  Symptoms decreased vision, leg weakness and falls, speech symptoms, also incomplete bladder emptying  Neurology evaluations with Dr. MERVIN Mukherjee     Previous eye problems  ~2013. Acute problem with left eye redness  Medical evaluation at [facility], subsequent evaluation at [facility]   Saw Dr. Bray/ophthalmology  Patient recalls comment about \"herpes\" infection of the eye, treatment with eye drops left eye, also Valtrex pills  Subsequent issues with eye swelling, has seen Dr. Kush Gutierrez/[facility] Ophthalmology dept  Treatment with eye drops, also \"cold pack\" vs \"hot pack\" to both eyes 3-4x/day  9/4/18. Eye evaluation with Dr. Gutierrez, diagnoses Blepharitis, H/O Herpes keratitis, left eye:, Cornea scar, left eye, Dry eye, both eyes      Progressive symptoms included weight loss ~35#, insomnia, decreased appetite              Also tremors, faster heart rate, muscle weakness legs/arms, fatigue  10/5/18. Med evaluation with Rosalba Landa CNP/[facility]              Diagnosis of hyperthyroidism              Began hyperthyroidism treatment with methimazole and metoprolol medication\"    She is currently taking 25 mg of methimazole daily and 25 mg of metoprolol daily.     She is eating \"all the time\" as she is worried about losing weight. Up 4 pounds since last visit.     Vision is blurry. She is using lubricating drops at night. No eye pain. Feels similar to when she saw ophthalmology this summer. She smokes 3/4 PPD.     Chronic tremors which are unchanged.   Chronic " "constipation.     ROS: 10 point ROS neg other than the symptoms noted above in the HPI.    O:  Vital signs:      BP: 139/69 Pulse: 73   Resp: 16 SpO2: 98 %       Weight: 65.2 kg (143 lb 12.8 oz)  Estimated body mass index is 21.24 kg/m  as calculated from the following:    Height as of 9/24/19: 1.753 m (5' 9\").    Weight as of this encounter: 65.2 kg (143 lb 12.8 oz).  Gen: In NAD.   HEENT: +proptosis, no lid lag, EOMI, thyroid palpable w/o clear nodule.   Card: S1 S2 RRR no m/r/g.  Pulm: CTA b/l.   GI: NT ND +BS.   Ext: no LE edema.   MSK: no gross deformities.  Neuro: + tremor, +2 DTR's    A/P:   Hyperthyroidism - +TSI antibody consistent with Grave's. Options of methimazole, I 131, and surgery reviewed. Given her BRIDGET and smoking, would favor methimazole for now. She was a little hyperthyroid still in 5/2019 when labs last checked. She has BRIDGET and smokes. We reviewed the risk of worsening BRIDGET.   -Labs today.  -Rx for nicotine patches.       I spent 25 minutes with the patient. Greater than 50% of the time spent in . Risks/benefits/alternatives reviewed.     Diego Figueroa MD on 12/2/2019 at 2:44 PM          Again, thank you for allowing me to participate in the care of your patient.        Sincerely,        Diego Figueroa MD    "

## 2019-12-03 DIAGNOSIS — E05.00 GRAVES' DISEASE: Primary | ICD-10-CM

## 2019-12-18 ENCOUNTER — TELEPHONE (OUTPATIENT)
Dept: FAMILY MEDICINE | Facility: CLINIC | Age: 64
End: 2019-12-18

## 2019-12-18 DIAGNOSIS — Z87.448: ICD-10-CM

## 2019-12-18 DIAGNOSIS — G35 MULTIPLE SCLEROSIS (H): ICD-10-CM

## 2019-12-18 NOTE — TELEPHONE ENCOUNTER
Patient is transferring companies where they fill catheter orders.  Can not transfer existing order  Cued order  Please advise on approval and we will fax to [facility].    Routing to provider.    Dimple RAMEY Rn

## 2019-12-18 NOTE — TELEPHONE ENCOUNTER
Reason for Call: Request for an order or referral:    Order or referral being requested: Pt's spouse Juan David brenda - EDILSON of file.  He is asking that an order for urinary catheters be faxed to [facility] - Fax# [phone].  Pt uses 6 per day.  No need to call patient back, unless there are questions or problems.      Date needed: as soon as possible    Has the patient been seen by the PCP for this problem? YES    Additional comments:     Phone number Patient's spouse can be reached at:  Home number on file [phone] (home)    Best Time:  anyu    Can we leave a detailed message on this number?  YES    Call taken on 12/18/2019 at 12:55 PM by Sheri Zaldivar

## 2019-12-24 ENCOUNTER — TELEPHONE (OUTPATIENT)
Dept: OBGYN | Facility: CLINIC | Age: 64
End: 2019-12-24

## 2019-12-24 NOTE — LETTER
December 24, 2019      Tonya RICHARDSON Rodriguez  [address] [place] [address]    Dear MsCarmelita,      This letter is to remind you that you are due for your follow up Mammogram and Colonoscopy.    Please call [phone] to schedule your mammogram and call [phone] to schedule your colonoscopy appointment at your earliest convenience.     If you have completed the tests outside of [facility], please have the results forwarded to our office. We will update the chart for your primary Physician to review before your next annual physical.     Sincerely,      Your [facility] Care Team

## 2019-12-24 NOTE — TELEPHONE ENCOUNTER
Panel Management Review        Health Maintenance List    Health Maintenance   Topic Date Due     MIGRAINE ACTION PLAN  1955     COLONOSCOPY  07/20/1965     HIV SCREENING  07/20/1970     PNEUMOCOCCAL IMMUNIZATION 19-64 MEDIUM RISK (1 of 1 - PPSV23) 07/20/1974     ZOSTER IMMUNIZATION (1 of 2) 07/20/2005     MEDICARE ANNUAL WELLNESS VISIT  08/17/2011     DTAP/TDAP/TD IMMUNIZATION (2 - Td) 04/09/2018     MAMMO SCREENING  12/08/2018     ADVANCE CARE PLANNING  01/15/2019     PHQ-9  07/16/2019     INFLUENZA VACCINE (1) 09/01/2019     HPV  05/02/2022     LIPID  02/08/2024     PAP  05/02/2024     HEPATITIS C SCREENING  Completed     DEPRESSION ACTION PLAN  Completed     IPV IMMUNIZATION  Aged Out     MENINGITIS IMMUNIZATION  Aged Out       Composite cancer screening  Chart review shows that this patient is due/due soon for the following Mammogram and Colonoscopy  Lab Results   Component Value Date    PAP NIL 05/02/2019     Past Surgical History:   Procedure Laterality Date     APPENDECTOMY  1980's    Retained suture     ARTHROPLASTY HIP  9/19/2012    Procedure: ARTHROPLASTY HIP;  Right Total Hip Minimally Invasive Surgery;  Surgeon: Devendra Douglas MD;  Location: [facility]     ARTHROSCOPY KNEE RT/LT  1989    Arthroscopy of the Left Knee     BUNIONECTOMY RT/LT      Bilateral bunionectomies x4 surgeries     DILATION AND CURETTAGE, OPERATIVE HYSTEROSCOPY WITH MORCELLATOR, COMBINED N/A 5/15/2019    Procedure: DILATION AND CURETTAGE,Hysteroscopy;  Surgeon: Lauren Moise MD;  Location: [facility]     EXCISE LESION TRUNK N/A 4/17/2015    Procedure: EXCISE LESION TRUNK;  Surgeon: Wander Gutierrez MD;  Location: [facility]     SURGICAL HISTORY OF -   1974, 1977    two normal deliveries     TUBAL LIGATION  1981-82       Is hysterectomy listed in surgical history? No   Is mastectomy listed in surgical history? No     Summary:    Patient is due/failing the following:   Mammogram and Colonoscopy    Action needed: Patient needs  office visit for mammogram and colonoscopy.    Type of outreach:  Sent [facility] message.      Staff Signature:  Swetha Rodriguez CMA

## 2019-12-29 DIAGNOSIS — E05.90 HYPERTHYROIDISM: ICD-10-CM

## 2019-12-29 DIAGNOSIS — R00.0 TACHYCARDIA: ICD-10-CM

## 2019-12-30 NOTE — TELEPHONE ENCOUNTER
"Requested Prescriptions   Pending Prescriptions Disp Refills     metoprolol succinate ER (TOPROL-XL) 25 MG 24 hr tablet [Pharmacy Med Name: Metoprolol Succinate ER Oral Tablet Extended Release 24 Hour 25 MG] 30 tablet 8     Sig: TAKE ONE TABLET BY MOUTH ONE TIME DAILY   Last Written Prescription Date:  3/15/19  Last Fill Quantity: 30 tab,  # refills: 9   Last office visit: 5/9/2019 with prescribing provider:  ADELSO Tello   Future Office Visit:        Beta-Blockers Protocol Passed - 12/29/2019  7:01 AM        Passed - Blood pressure under 140/90 in past 12 months     BP Readings from Last 3 Encounters:   12/02/19 139/69   09/24/19 (!) 144/91   05/15/19 123/52                 Passed - Patient is age 6 or older        Passed - Recent (12 mo) or future (30 days) visit within the authorizing provider's specialty     Patient has had an office visit with the authorizing provider or a provider within the authorizing providers department within the previous 12 mos or has a future within next 30 days. See \"Patient Info\" tab in inbasket, or \"Choose Columns\" in Meds & Orders section of the refill encounter.              Passed - Medication is active on med list          "

## 2019-12-31 RX ORDER — METOPROLOL SUCCINATE 25 MG/1
TABLET, EXTENDED RELEASE ORAL
Qty: 30 TABLET | Refills: 2 | Status: SHIPPED | OUTPATIENT
Start: 2019-12-31 | End: 2020-04-22

## 2019-12-31 NOTE — TELEPHONE ENCOUNTER
Prescription approved per [facility].    Melissa RUFFIN RN

## 2020-01-28 ENCOUNTER — TELEPHONE (OUTPATIENT)
Dept: NEUROLOGY | Facility: CLINIC | Age: 65
End: 2020-01-28

## 2020-01-28 NOTE — TELEPHONE ENCOUNTER
Reason for Call:  Other     Detailed comments: Pt is having trouble walking due to her MS. Symptoms have gotten worse. Pt is in [place] right now on vacation. - Please advise     Phone Number Patient can be reached at: Home number on file [phone] (home)    Best Time: Any    Can we leave a detailed message on this number? YES    Call taken on 1/28/2020 at 10:15 AM by Denise Behrendt

## 2020-02-08 ENCOUNTER — HEALTH MAINTENANCE LETTER (OUTPATIENT)
Age: 65
End: 2020-02-08

## 2020-02-16 DIAGNOSIS — G35 MULTIPLE SCLEROSIS (H): ICD-10-CM

## 2020-02-16 DIAGNOSIS — R32 URINARY INCONTINENCE, UNSPECIFIED TYPE: ICD-10-CM

## 2020-02-16 NOTE — LETTER
[facility]  [address] [facility] [address]  Phone: [phone]       February 18, 2020         Tonya Rodriguez  [address] [place] [id number]            Dear Tonya:    We are concerned about your health care.  We recently provided you with medication refills.  Many medications require routine follow-up with your doctor.    Your prescription(s) have been refilled for 30 days so you may have time for the above noted follow-up. Please call to schedule soon so we can assure you have an appointment before your next refills are needed.    Thank you,      Robb Cabrera MD / josephine

## 2020-02-17 NOTE — TELEPHONE ENCOUNTER
"Requested Prescriptions   Pending Prescriptions Disp Refills     imipramine (TOFRANIL) 50 MG tablet [Pharmacy Med Name: IMIPRAMINE 50MG TABLETS]  Last Written Prescription Date:  1/16/19  Last Fill Quantity: 90,  # refills: 3   Last Office Visit with G, P or [facility] prescribing provider:  5/9/19   Future Office Visit:      90 tablet 3     Sig: TAKE 1 TABLET BY MOUTH AT BEDTIME. GENERIC EQUIVALENT FOR TOFRANIL       Tricyclic Antidepressants Protocol Passed - 2/16/2020  7:46 AM        Passed - Blood pressure under 140/90 in past 12 months     BP Readings from Last 3 Encounters:   12/02/19 139/69   09/24/19 (!) 144/91   05/15/19 123/52                 Passed - Recent (12 mo) or future (30 days) visit within the authorizing provider's specialty      Patient has had an office visit with the authorizing provider or a provider within the authorizing providers department within the previous 12 mos or has a future within next 30 days. See \"Patient Info\" tab in inbasket, or \"Choose Columns\" in Meds & Orders section of the refill encounter.              Passed - Medication is active on med list        Passed - Patient is age 18 or older        Passed - No active pregnancy on record        Passed - No positive pregnancy test in past 12 months          "

## 2020-02-18 DIAGNOSIS — H17.9 LEFT CORNEAL SCAR: ICD-10-CM

## 2020-02-18 RX ORDER — IMIPRAMINE HCL 50 MG
TABLET ORAL
Qty: 30 TABLET | Refills: 0 | Status: SHIPPED | OUTPATIENT
Start: 2020-02-18 | End: 2020-03-15

## 2020-02-18 NOTE — TELEPHONE ENCOUNTER
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  She was given this by Dr. Ankur Bridges.  He is coming back to [facility] in August.  Will give her a 4 months supply of valtrex until she can see cornea again.      Routing refill request to provider for review/approval because::  Continue with the med for Pt care??  Pt supposed to follow up with Cornea specialist  Per Providers note on 6/10/2019.     Refer to clinic for review.    Yaritza Cruz RN  Central Triage Red Flags/Med Refills

## 2020-02-20 RX ORDER — VALACYCLOVIR HYDROCHLORIDE 500 MG/1
500 TABLET, FILM COATED ORAL DAILY
Qty: 30 TABLET | Refills: 1 | Status: SHIPPED | OUTPATIENT
Start: 2020-02-20 | End: 2021-01-12

## 2020-02-21 ENCOUNTER — TELEPHONE (OUTPATIENT)
Dept: OPHTHALMOLOGY | Facility: CLINIC | Age: 65
End: 2020-02-21

## 2020-02-21 NOTE — TELEPHONE ENCOUNTER
LVM for pt that we have refilled Valacyclovir for 2 months only; which we have also scheduled her for rtn visit for further refills needed as she does need to come to this visit or no other refills will be filled; Appt rtn due for med refills.    Clinic number left for further questions.    Geetha Fritz, COA COA 9:30 AM February 21, 2020

## 2020-03-14 DIAGNOSIS — R32 URINARY INCONTINENCE, UNSPECIFIED TYPE: ICD-10-CM

## 2020-03-14 DIAGNOSIS — G35 MULTIPLE SCLEROSIS (H): ICD-10-CM

## 2020-03-15 RX ORDER — IMIPRAMINE HCL 50 MG
TABLET ORAL
Qty: 30 TABLET | Refills: 1 | Status: SHIPPED | OUTPATIENT
Start: 2020-03-15 | End: 2020-04-22

## 2020-03-15 NOTE — TELEPHONE ENCOUNTER
"Requested Prescriptions   Pending Prescriptions Disp Refills     imipramine (TOFRANIL) 50 MG tablet [Pharmacy Med Name: IMIPRAMINE 50MG TABLETS] 30 tablet 0     Sig: TAKE 1 TABLET BY MOUTH AT BEDTIME GENERIC EQUIVALENT FOR TOFRANIL       Tricyclic Antidepressants Protocol Passed - 3/14/2020  8:28 AM        Passed - Blood pressure under 140/90 in past 12 months     BP Readings from Last 3 Encounters:   12/02/19 139/69   09/24/19 (!) 144/91   05/15/19 123/52                 Passed - Recent (12 mo) or future (30 days) visit within the authorizing provider's specialty      Patient has had an office visit with the authorizing provider or a provider within the authorizing providers department within the previous 12 mos or has a future within next 30 days. See \"Patient Info\" tab in inbasket, or \"Choose Columns\" in Meds & Orders section of the refill encounter.              Passed - Medication is active on med list        Passed - Patient is age 18 or older        Passed - No active pregnancy on record        Passed - No positive pregnancy test in past 12 months             "

## 2020-04-02 ENCOUNTER — TELEPHONE (OUTPATIENT)
Dept: OPHTHALMOLOGY | Facility: CLINIC | Age: 65
End: 2020-04-02

## 2020-04-02 NOTE — TELEPHONE ENCOUNTER
COVID-19 RESCHEDULES     Needed: NA    Date contacted: April 2, 2020 9:25 AM    Type of contact:  left message (enter if it was other  name)    Current appointment date: 4/7/20    Attending provider: SILVANO ESCALANTE    Current Eye Symptoms: NA    Refills needed: valACYclovir 500 MG PO tablet (Will send to resident to refill fr the three months, no refills after that until beign seen by provider).    Best contact for rescheduling: on file or by mail    Best date/time for rescheduling: DEFER TO NEXT AVAIALBLE    COVID19 warnings given about screening and visitors: NO    MAIL LETTER FOR NO CONTACT: YES ON 4/2/2020    Geetha Fritz, COA COA 9:25 AM April 2, 2020

## 2020-04-22 ENCOUNTER — VIRTUAL VISIT (OUTPATIENT)
Dept: FAMILY MEDICINE | Facility: CLINIC | Age: 65
End: 2020-04-22
Payer: COMMERCIAL

## 2020-04-22 DIAGNOSIS — E05.90 HYPERTHYROIDISM: ICD-10-CM

## 2020-04-22 DIAGNOSIS — R00.0 TACHYCARDIA: ICD-10-CM

## 2020-04-22 DIAGNOSIS — G35 MULTIPLE SCLEROSIS (H): ICD-10-CM

## 2020-04-22 DIAGNOSIS — R32 URINARY INCONTINENCE, UNSPECIFIED TYPE: ICD-10-CM

## 2020-04-22 DIAGNOSIS — E78.2 MIXED HYPERLIPIDEMIA: ICD-10-CM

## 2020-04-22 DIAGNOSIS — G47.00 INSOMNIA, UNSPECIFIED TYPE: ICD-10-CM

## 2020-04-22 DIAGNOSIS — G25.81 RESTLESS LEG: ICD-10-CM

## 2020-04-22 PROCEDURE — 99214 OFFICE O/P EST MOD 30 MIN: CPT | Mod: 95 | Performed by: FAMILY MEDICINE

## 2020-04-22 RX ORDER — GABAPENTIN 100 MG/1
200 CAPSULE ORAL AT BEDTIME
Qty: 180 CAPSULE | Refills: 3 | Status: SHIPPED | OUTPATIENT
Start: 2020-04-22 | End: 2021-12-24

## 2020-04-22 RX ORDER — IMIPRAMINE HCL 50 MG
TABLET ORAL
Qty: 90 TABLET | Refills: 3 | Status: SHIPPED | OUTPATIENT
Start: 2020-04-22 | End: 2021-12-24

## 2020-04-22 RX ORDER — SIMVASTATIN 20 MG
20 TABLET ORAL AT BEDTIME
Qty: 90 TABLET | Refills: 3 | Status: SHIPPED | OUTPATIENT
Start: 2020-04-22 | End: 2021-12-24

## 2020-04-22 RX ORDER — METOPROLOL SUCCINATE 25 MG/1
25 TABLET, EXTENDED RELEASE ORAL DAILY
Qty: 90 TABLET | Refills: 3 | Status: SHIPPED | OUTPATIENT
Start: 2020-04-22 | End: 2021-08-05

## 2020-04-22 RX ORDER — OXYBUTYNIN CHLORIDE 15 MG/1
30 TABLET, EXTENDED RELEASE ORAL AT BEDTIME
Qty: 90 TABLET | Refills: 3 | Status: SHIPPED | OUTPATIENT
Start: 2020-04-22 | End: 2021-12-24

## 2020-04-22 RX ORDER — TRAZODONE HYDROCHLORIDE 50 MG/1
50 TABLET, FILM COATED ORAL
Qty: 90 TABLET | Refills: 3 | Status: SHIPPED | OUTPATIENT
Start: 2020-04-22 | End: 2021-12-24

## 2020-04-22 ASSESSMENT — PATIENT HEALTH QUESTIONNAIRE - PHQ9: SUM OF ALL RESPONSES TO PHQ QUESTIONS 1-9: 1

## 2020-04-22 NOTE — PROGRESS NOTES
"Tonya Rodriguez is a 64 year old female who is being evaluated via a billable telephone visit.      The patient has been notified of following:     \"This telephone visit will be conducted via a call between you and your physician/provider. We have found that certain health care needs can be provided without the need for a physical exam.  This service lets us provide the care you need with a short phone conversation.  If a prescription is necessary we can send it directly to your pharmacy.  If lab work is needed we can place an order for that and you can then stop by our lab to have the test done at a later time.    Telephone visits are billed at different rates depending on your insurance coverage. During this emergency period, for some insurers they may be billed the same as an in-person visit.  Please reach out to your insurance provider with any questions.    If during the course of the call the physician/provider feels a telephone visit is not appropriate, you will not be charged for this service.\"    Patient has given verbal consent for Telephone visit?  Yes    How would you like to obtain your AVS? Mail a copy    Subjective     Tonya Rodriguez is a 64 year old female who presents to clinic today for the following health issues:    HPI  Hyperlipidemia Follow-Up      Are you regularly taking any medication or supplement to lower your cholesterol?   Yes- simvastatin    Are you having muscle aches or other side effects that you think could be caused by your cholesterol lowering medication?  No      How many servings of fruits and vegetables do you eat daily?  2-3    On average, how many sweetened beverages do you drink each day (Examples: soda, juice, sweet tea, etc.  Do NOT count diet or artificially sweetened beverages)?   12 oz per day    How many days per week do you exercise enough to make your heart beat faster? 0    How many minutes a day do you exercise enough to make your heart beat faster?     How " many days per week do you miss taking your medication? 0    Medication Followup of imipramine, metoprolol, trazadone    Taking Medication as prescribed: yes    Side Effects:  None    Medication Helping Symptoms:  yes     She is also using medication for her insomnia.  She uses it intermittently.  No side effects.    She also needs to have gabapentin for her RLS  No side effects.    She also uses her medication for her bladder.  No side effects.              Reviewed and updated as needed this visit by Provider  Tobacco  Allergies  Meds  Problems  Med Hx  Surg Hx  Fam Hx         Review of Systems   ROS COMP: CONSTITUTIONAL:NEGATIVE for fever, chills, change in weight  RESP:NEGATIVE for significant cough or SOB  CV: NEGATIVE for chest pain, palpitations or peripheral edema  GI: NEGATIVE for nausea, abdominal pain, heartburn, or change in bowel habits  MUSCULOSKELETAL: stable  NEURO: stable  PSYCHIATRIC: NEGATIVE for changes in mood or affect       Objective   Reported vitals:  There were no vitals taken for this visit.   healthy, alert and no distress  PSYCH: Alert and oriented times 3; coherent speech, normal   rate and volume, able to articulate logical thoughts, able   to abstract reason, no tangential thoughts, no hallucinations   or delusions  Her affect is normal  RESP: No cough, no audible wheezing, able to talk in full sentences  Remainder of exam unable to be completed due to telephone visits            Assessment/Plan:  1. Insomnia, unspecified type  Stable and controlled using prn  - traZODone (DESYREL) 50 MG tablet; Take 1 tablet (50 mg) by mouth nightly as needed for sleep  Dispense: 90 tablet; Refill: 3    2. Mixed hyperlipidemia  On med and stable, will check a future lab  - simvastatin (ZOCOR) 20 MG tablet; Take 1 tablet (20 mg) by mouth At Bedtime  Dispense: 90 tablet; Refill: 3  - Lipid panel reflex to direct LDL Fasting; Future    3. Urinary incontinence, unspecified type  Controlled on  med  - imipramine (TOFRANIL) 50 MG tablet; TAKE 1 TABLET BY MOUTH AT BEDTIME GENERIC EQUIVALENT FOR TOFRANIL  Dispense: 90 tablet; Refill: 3  - oxybutynin ER (DITROPAN XL) 15 MG 24 hr tablet; Take 2 tablets (30 mg) by mouth At Bedtime  Dispense: 90 tablet; Refill: 3    4. Multiple sclerosis (H)    - imipramine (TOFRANIL) 50 MG tablet; TAKE 1 TABLET BY MOUTH AT BEDTIME GENERIC EQUIVALENT FOR TOFRANIL  Dispense: 90 tablet; Refill: 3    5. Tachycardia  Stable on med  - metoprolol succinate ER (TOPROL-XL) 25 MG 24 hr tablet; Take 1 tablet (25 mg) by mouth daily  Dispense: 90 tablet; Refill: 3    6. Hyperthyroidism  Seeing endocrinology for this medication  - metoprolol succinate ER (TOPROL-XL) 25 MG 24 hr tablet; Take 1 tablet (25 mg) by mouth daily  Dispense: 90 tablet; Refill: 3    7. Restless leg  stable  - gabapentin (NEURONTIN) 100 MG capsule; Take 2 capsules (200 mg) by mouth At Bedtime  Dispense: 180 capsule; Refill: 3    Return in about 6 months (around 10/22/2020) for Preventative Exam.      Phone call duration:  21 minutes    Robb Cabrera MD

## 2020-07-10 DIAGNOSIS — G35 MULTIPLE SCLEROSIS (H): ICD-10-CM

## 2020-07-10 DIAGNOSIS — G47.00 INSOMNIA, UNSPECIFIED TYPE: ICD-10-CM

## 2020-07-10 DIAGNOSIS — R32 URINARY INCONTINENCE, UNSPECIFIED TYPE: ICD-10-CM

## 2020-07-10 RX ORDER — IMIPRAMINE HCL 50 MG
TABLET ORAL
Qty: 90 TABLET | Refills: 3 | OUTPATIENT
Start: 2020-07-10

## 2020-07-10 RX ORDER — TRAZODONE HYDROCHLORIDE 50 MG/1
TABLET, FILM COATED ORAL
Qty: 90 TABLET | Refills: 3 | OUTPATIENT
Start: 2020-07-10

## 2020-07-15 DIAGNOSIS — E05.90 HYPERTHYROIDISM: ICD-10-CM

## 2020-07-15 NOTE — TELEPHONE ENCOUNTER
LOV 12-2-2019 [place]  6 months follow up with labs prior - NOT DONE    90 days pended with SIG & Pharm note.  Needs call to schedule appt/labs.    [facility]  [facility]Dr Morton's office    RT Hui (R)

## 2020-07-29 ENCOUNTER — TELEPHONE (OUTPATIENT)
Dept: FAMILY MEDICINE | Facility: CLINIC | Age: 65
End: 2020-07-29

## 2020-07-29 DIAGNOSIS — G35 MULTIPLE SCLEROSIS (H): Primary | ICD-10-CM

## 2020-07-29 DIAGNOSIS — Z78.9 SELF-CATHETERIZES URINARY BLADDER: Chronic | ICD-10-CM

## 2020-07-29 NOTE — TELEPHONE ENCOUNTER
Reason for Call:  Medication refill:    Do you use a [facility]?  Name of the pharmacy and phone number for the current request:  [facility], [phone]    Name of the medication requested: Urethral catheters    Can we leave a detailed message on this number? YES    Phone number patient can be reached at: Home number on file [phone] (home)    Best Time: Any    Call taken on 7/29/2020 at 3:59 PM by Joan Sheppard

## 2020-07-30 NOTE — TELEPHONE ENCOUNTER
I called [facility]  They are new to pt with no prior orders.    They need to know how many units are needed each month?    Number of months needed.    What type is she using?      Left non-detailed message for patient or her spouse to return a call to the clinic RN.   MARILEE Cox RN

## 2020-07-30 NOTE — TELEPHONE ENCOUNTER
Please call Tonya,  I need to know exactly what she needs per the order meaning the catheter specifications.  The order brings up many different types of catheters, size, straight, curved tips etc.  In addition how many times during the day.  Sincerely,  Robb Cabrera MD

## 2020-08-18 ENCOUNTER — TELEPHONE (OUTPATIENT)
Dept: FAMILY MEDICINE | Facility: CLINIC | Age: 65
End: 2020-08-18

## 2020-08-18 DIAGNOSIS — Z87.448: ICD-10-CM

## 2020-08-18 DIAGNOSIS — G35 MULTIPLE SCLEROSIS (H): ICD-10-CM

## 2020-08-18 NOTE — TELEPHONE ENCOUNTER
Reason for Call:  Other prescription    Detailed comments:  called asked for me to call [facility] for a Rx order for Urethral Catheters. And the amount each months she should be getting.   [facility] number is [phone] and the fax number is [phone]    Phone Number Patient can be reached at: Home number on file [phone] (home)    Best Time: any    Can we leave a detailed message on this number? YES    Call taken on 8/18/2020 at 1:27 PM by Caterina Craven

## 2020-08-18 NOTE — TELEPHONE ENCOUNTER
S: Detailed comments:  called asked for me to call [facility] for a Rx order for Urethral Catheters. And the amount each months she should be getting.   [facility] number is [phone] and the fax number is [phone]  B: last order 12/20/19 540 caths 3 refills     A:   They need to know how many units are needed each month?  Number of months needed.     What type is she using?      R  Please answer questions above.   Pended old order if provider agrees.

## 2020-11-07 ENCOUNTER — HEALTH MAINTENANCE LETTER (OUTPATIENT)
Age: 65
End: 2020-11-07

## 2020-12-13 NOTE — TELEPHONE ENCOUNTER
Routing refill request to provider for review/approval because:  RN cannot sign for DME order.     Patient requesting urethral catheter supplies order.      ARETHA ForrestN, RN           Statement Selected

## 2021-01-12 DIAGNOSIS — H17.9 LEFT CORNEAL SCAR: ICD-10-CM

## 2021-01-12 NOTE — TELEPHONE ENCOUNTER
valACYclovir (VALTREX) 500 MG tablet    Requested directions:  Take 1 tablet (500 mg) by mouth daily Will prescribe only for 2 months to last you until you have an appointment with our [facility] doctors. Please schedule an appointment with Dr Francis at [phone]. - Oral  Current directions on the medication list: Take 1 tablet (500 mg) by mouth daily Will prescribe only for 2 months to last you until you have an appointment with our [facility] doctors. Please schedule an appointment with Dr Francis at [phone]. - Oral    Last Written Prescription Date:   2/20/2021  Last Fill Quantity: 30,   # refills: 1    Last Office Visit: 6/10/2019  Future Office visit:  None    Attending Provider:     Diego Oscar MD  Ophthalmology     Last Clinic Note:  6/10/2019    Assessment & Plan      Tonya Rodriguez is a 63 year old female with the following diagnoses:   1. Thyroid eye disease          Tonya Rodriguez is a 63 year old female with a history of BRIDGET and MS who presents to receive a medical letter indicating that her vision is adequate to drive in the [facility]. Last visit was in BRIDGET clinic 3 months ago. At that time we recommended continuing methimazole, follow up with endocrine and follow up in BRIDGET clinic in 6 months. Today, patient notes some blurry vision that has not changed since last time.      Examination today showed a visual acuity was 20/40 in the right eye and 20/200 in the left eye. Intraocular pressure was 21 in the right eye and 18 in the left eye. Pupils reacted briskly in both eyes. Ishihara color plates were 11/11 in the right eye and 11/11 in the left eye. Slit lamp exam showed some PEEs. [place] base 106 was 22 mm right eye 24 left eye. 's License Kinetic & Static Visual Fields were conducted. It showed an infero-temporal visual field defect in the right eye.      In summary, Tonya Rodriguez is a 63 year old female who presents for follow up to receive a medical letter  indicating that she can drive in the [facility].  Her visual acuity is correctable to 20/30 in the RIGHT eye.  Her visual field shows enough visual field horizontally to drive in the [facility].      Return to BRIDGET clinic in September . If symptoms worsen or change, please do not hesitate to return earlier.      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  She was given this by Dr. Ankur Bridges.  He is coming back to [facility] in August.  Will give her a 4 months supply of valtrex until she can see cornea again.           Routing refill request to provider for review/approval because:  Second Request     Over due office visit  Refer to clinic for review.      Yaritza Cruz RN  Central Triage Red Flags/Med Refills

## 2021-01-13 NOTE — TELEPHONE ENCOUNTER
[phone] home/cell    Left message with direct number at [phone] to review refill request/scheduling    Note to [facility] resident to review updating Rx without refill in meantime.  Last visit in April cancelled by clinic for COVID scheduling concerns    Jameson aWllace RN 10:19 AM 01/14/21    ---    Left message at 1845 to review scheduling/refill request    Jameson Wallace RN 9:35 AM 01/13/21

## 2021-01-15 RX ORDER — VALACYCLOVIR HYDROCHLORIDE 500 MG/1
500 TABLET, FILM COATED ORAL DAILY
Qty: 30 TABLET | Refills: 1 | Status: SHIPPED | OUTPATIENT
Start: 2021-01-15 | End: 2021-12-24

## 2021-02-03 RX ORDER — METHIMAZOLE 10 MG/1
TABLET ORAL
Qty: 225 TABLET | Refills: 0 | Status: SHIPPED | OUTPATIENT
Start: 2021-02-03 | End: 2022-01-25

## 2021-03-08 ENCOUNTER — IMMUNIZATION (OUTPATIENT)
Dept: FAMILY MEDICINE | Facility: CLINIC | Age: 66
End: 2021-03-08
Payer: COMMERCIAL

## 2021-03-08 PROCEDURE — 91300 PR COVID VAC PFIZER DIL RECON 30 MCG/0.3 ML IM: CPT

## 2021-03-08 PROCEDURE — 0001A PR COVID VAC PFIZER DIL RECON 30 MCG/0.3 ML IM: CPT

## 2021-03-29 ENCOUNTER — IMMUNIZATION (OUTPATIENT)
Dept: FAMILY MEDICINE | Facility: CLINIC | Age: 66
End: 2021-03-29
Attending: FAMILY MEDICINE
Payer: COMMERCIAL

## 2021-03-29 PROCEDURE — 0002A PR COVID VAC PFIZER DIL RECON 30 MCG/0.3 ML IM: CPT

## 2021-03-29 PROCEDURE — 91300 PR COVID VAC PFIZER DIL RECON 30 MCG/0.3 ML IM: CPT

## 2021-04-19 NOTE — TELEPHONE ENCOUNTER
Dr. Cabrera,    Do you know if/how long patient should be off thyroid medication for her NM thyroid Scan?    Thanks,  Mariza CHAPPELL RN     Endometrial cancer Endometrial cancer Endometrial cancer Type 2 diabetes mellitus Endometrial cancer Endometrial cancer

## 2021-05-12 ENCOUNTER — TELEPHONE (OUTPATIENT)
Dept: FAMILY MEDICINE | Facility: CLINIC | Age: 66
End: 2021-05-12

## 2021-05-12 DIAGNOSIS — N31.9 NEUROGENIC BLADDER: ICD-10-CM

## 2021-05-12 DIAGNOSIS — G35 MULTIPLE SCLEROSIS (H): Primary | ICD-10-CM

## 2021-05-12 DIAGNOSIS — Z78.9 SELF-CATHETERIZES URINARY BLADDER: Chronic | ICD-10-CM

## 2021-05-12 NOTE — TELEPHONE ENCOUNTER
Reason for Call:  DME refill:    Do you use a [facility]?  Name of the pharmacy and phone number for the current request:  [facility]    Name of the medication requested: Catheters    Other request: Patient needs new catheter order, previous rx was lost. Need rx sent to [facility], fax [phone]    Can we leave a detailed message on this number? YES    Phone number patient can be reached at: Home number on file [phone] (home)    Best Time: Any    Call taken on 5/12/2021 at 4:31 PM by Joan Sheppard

## 2021-05-12 NOTE — TELEPHONE ENCOUNTER
Last order is dated 8/18/20.  540 count  3 refills    Dx: MS    Order is still current.  Printed and faxed.    Anna Cox RN

## 2021-06-09 ENCOUNTER — TELEPHONE (OUTPATIENT)
Dept: FAMILY MEDICINE | Facility: CLINIC | Age: 66
End: 2021-06-09

## 2021-06-09 NOTE — TELEPHONE ENCOUNTER
Gutierrez. From [facility] who is calling who was hard to understand. I did ask him to repeat himself a few times to take down the information. He became very rude and asked if I was listening and asked if I was even able to take this information. He is asking for the patients insurance information, so I did question him how has he been billing her in the past he said we have not.So he wants her Demographics. And a New Rx DME order for these supplies. He told me a number I believe with 7 refills or so then I asked him to repeat the refill and he still said that I was very rude and asked for my name and last name and title and he said so your not even a nurse. I said no I take the message and give to the doctors. I asked again how many refills are you asking and he said im not asking its what the doctors prefers. I said ok I was just asking to put in the note. He started yelling at me so I said I will send this request to the doctor and said good bye I had to end the call.    He called back and talked with Joan and he was very rude to her also. Joan said he hung up on her.      They are asking for a new DME for Catheters ureteral catheter, 16 fr.  he said she has already used the refills. And sent the insurance information with the DME.   Phone number [phone]  Fax number [phone]

## 2021-06-09 NOTE — TELEPHONE ENCOUNTER
Please call the patient to find out the exact catheter and bag etc.  Please see the order for the catheter with all of the options.  Also check the frequency she is using, like how many per day, confirm the size, brand, type etc.  Robb Cabrera MD  Family Medicine

## 2021-06-09 NOTE — TELEPHONE ENCOUNTER
"A man named Gutierrez from  is calling. He is asking for patient address, phone number and insurance number. He says he is trying to get a \"new order\" for patient's ureteral catheter, 16 fr.   It was very hard to understand him.  I had to ask him several times what he specifically needed.  He was very agitated with any questions to him.  I don't see a current order for catheter supplies.  He hung up on me.  "

## 2021-06-09 NOTE — TELEPHONE ENCOUNTER
I called pt.  She says that she straight caths every 3 hours, even during the night.  Pt uses Cure Ultra Female Straight Tip Cather, 16 Tamazight.  They come in boxes of 30.    3 month supply is 720 catheters (24 boxes)    Anna Cox RN

## 2021-07-15 NOTE — TELEPHONE ENCOUNTER
Routing refill request to provider for review/approval because:  Drug not on the [facility] refill protocol          15-Jul-2021 19:28

## 2021-08-04 ENCOUNTER — OFFICE VISIT (OUTPATIENT)
Dept: NEUROLOGY | Facility: CLINIC | Age: 66
End: 2021-08-04
Payer: COMMERCIAL

## 2021-08-04 VITALS
BODY MASS INDEX: 23.7 KG/M2 | HEART RATE: 85 BPM | SYSTOLIC BLOOD PRESSURE: 142 MMHG | WEIGHT: 160 LBS | DIASTOLIC BLOOD PRESSURE: 75 MMHG | HEIGHT: 69 IN

## 2021-08-04 DIAGNOSIS — E05.90 HYPERTHYROIDISM: ICD-10-CM

## 2021-08-04 DIAGNOSIS — R00.0 TACHYCARDIA: ICD-10-CM

## 2021-08-04 DIAGNOSIS — G35 MULTIPLE SCLEROSIS (H): Primary | ICD-10-CM

## 2021-08-04 PROCEDURE — 99205 OFFICE O/P NEW HI 60 MIN: CPT | Performed by: PSYCHIATRY & NEUROLOGY

## 2021-08-04 ASSESSMENT — MONTREAL COGNITIVE ASSESSMENT (MOCA)
WHAT LEVEL OF EDUCATION WAS ATTAINED: 0
9. REPEAT EACH SENTENCE: 2
4. NAME EACH OF THE THREE ANIMALS SHOWN: 3
12. MEMORY INDEX SCORE: 1
7. [VIGILENCE] TAP WHEN HEARING DESIGNATED LETTER: 1
10. [FLUENCY] NAME WORDS STARTING WITH DESIGNATED LETTER: 0
WHAT IS THE TOTAL SCORE (OUT OF 30): 25
8. SERIAL SUBTRACTION OF 7S: 3
VISUOSPATIAL/EXECUTIVE SUBSCORE: 5
6. READ LIST OF DIGITS [FORWARD/BACKWARD]: 2
13. ORIENTATION SUBSCORE: 6
11. FOR EACH PAIR OF WORDS, WHAT CATEGORY DO THEY BELONG TO (OUT OF 2): 2

## 2021-08-04 ASSESSMENT — MIFFLIN-ST. JEOR: SCORE: 1330.14

## 2021-08-04 NOTE — LETTER
August 13, 2021      Tonya DYLAN Rodriguez  [address] [place] [id number]        Dear MsCarmelita,    We are writing to inform you of your test results.    Swallow study 8/13/2021  No aspiration    Continue as planned    If you have any questions or concerns, please call the clinic at the number listed above.       Sincerely,    Dr. Devendra Tabares

## 2021-08-04 NOTE — LETTER
[facility]  [address] [facility] [address]  Phone: [phone]       August 6, 2021         Tonya Rodriguez  [address] [place] [id number]            Dear Tonya:    We are concerned about your health care.  We recently provided you with medication refills.  Many medications require routine follow-up with your doctor in office.    Your prescription(s) have been refilled for 90 days so you may have time for the above noted follow-up. Please call to schedule soon so we can assure you have an appointment before your next refills are needed.    Thank you,      Robb Cabrera MD / Lynda PONCE RN

## 2021-08-04 NOTE — NURSING NOTE
RENEA COGNITIVE ASSESSMENT (MOCA)  Version 7.1 Original Version  VISUOSPATIAL/EXECUTIVE               COPY CUBE      [ 1   ]                                [   1 ] DRAW CLOCK (Ten past eleven)  (3 points)    [  1  ]                    [  1  ]               [  1  ]       Contour            Numbers     Hands POINTS                  5 / 5   NAMING    [ 1  ]                                                                        [ 1   ]                                             [ 1   ]  Lianabel Gee                                Camel                   3  / 3   MEMORY Read list of words, subject must repeat them. Do 2 trials, even if 1st trial is successful. Do a recall after 5 minutes  FACE VELVET Latter day NIA RED No Points    1st          2nd         ATTENTION Read list of digits (1 digit/sec) Subject has to repeat in the forward order       [  1  ]   2  1  8  5  4                                [  1  ] 7 4 2                         2 /2   Read list of letters. The subject must tap with his hand at each letter A. No points if > 2 errors.  [  1  ] F B A C M N A A J K L B A F A K D E A A A J A M O F A A B             1 /1   Serial 7 subtraction starting at 100          [  1  ] 93         [  1  ] 86          [  1  ] 79          [  1  ] 72         [ 0   ] 65   4 or 5 correct subtractions: 3 points,  2 or 3 correct: 2 points,  1correct: 1 point,   0 correct: 0 points           3 /3   LANGUAGE Repeat: I only know that Kulwinder is the one to help today. [   1  ]                                      The cat always hid under the couch when dogs were in the room. [ 1  ]              2 /2   Fluency: Name maximum number of words in one minute that begin with the letter F                                                                                                                    [  0  ] __8_ (N > 11 words)             0  /1   ABSTRACTION Similarity  between e.g. banana-orange=fruit                                                                   [  1  ] train-bicycle                      [  1  ] watch-ruler            2  /2   DELAYED  RECALL Has to recall words  WITH NO CUE FACE  [  1  ] VELVET  [  0  ] Restorationist  [  0  ]  NIA  [  0  ] RED  [   0 ] Points for UNCUED recall only            1/5           OPTIONAL Category cue           Multiple choice cue          ORIENTATION  [  1  ] Date     [   1 ] Month       [   1 ] Year      [ 1   ] Day      [   1 ] Place        [  1  ] City         6 /6   TOTAL  Normal > 26/30 Add 1 point if < 12 years education      25  /30

## 2021-08-04 NOTE — LETTER
8/4/2021         RE: Tonya Rodriguez  [address] [place] [id number]        Dear Colleague,    Thank you for referring your patient, Tonya Rodriguez, to the [facility] [place]. Please see a copy of my visit note below.    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  Has had hyperthyroidism in the past with proptosis    Has difficulty with swallowing coughs when she eats certain foods such as lettuce or meats that are hard to chew do not go down as well    Significant ataxia with left-sided weakness and clumsiness greater than right  Has a lift on the staircase    Significant urinary difficulties follows with urology is supposed to self cath every 3-4 hours    No longer drives    At home lives with her   Staircase has a motorized lift seat that she uses  As a walk-in shower with a shower bar  Has a hand-held sprayer  Uses a 4 wheeled walker with seat and hand brakes    Question whether they should switch out the seat in the shower so that she can use it as she thinks it is too small  Does follow with urology for her urinary retention and self caths about every 4 hours  Poor vision which also affects balance is going to be seeing the eye doctor shortly        Neurologic history summary  Diagnosed with multiple sclerosis in the late 1980s.  Presented with difficulty with gait falling down and bladder problems.  Work-up by Dr. Goins with lumbar puncture positive for MS  Treated with Copaxone in the past had some injection site irritation.  In 2005 had reports of some memory difficulties  Has had  significant bladder control problems  Trouble with dysarthria and speech  Also had significant difficulty with fatigue  Has been totally disabled since   Around  seen at the [facility] Dr. Luis Antonio Bray, recommended a trial of Cytoxan patient chose not to pursue that  Followed by Dr. Mukherjee from [phone]  Consider Tecfidera in       Past medical history  Multiple sclerosis onset 1980s  Left eye herpetic infection with visual loss around   Urinary incontinence  Insomnia  Hyperlipidemia  Hyper thyroidism/proptosis  Restless leg syndrome  Depression  Low back pain      Habits  History of smoking  Does not use alcohol  Totally disabled since   Past driving restrictions, no freeway driving, no rush hour driving last evaluated 2017  No longer drives      Family history  Father with lung cancer and MI  at age 66  Mother with MI and high blood pressure  at age 73  Brother  in motor vehicle accident  Sister with cervical cancer      Work-up  MRI scan brain 2005 increasing plaque load compared to 1999 no active plaques  MRI brain 2007 moderate white matter changes stable compared to   MRI brain 2014, multiple new chronic lesions compared to previous scan no active lesions seen (compared to )  MRI scan brain 2015  A.  Diffuse volume loss and cerebral white matter changes consistent with multiple sclerosis  B.  No active lesions seen on contrast scan  [facility]  2021,     Exam    Review of systems pertinent positives and negatives otherwise see HPI  No headache no chest pain no shortness of breath no nausea vomiting no diarrhea no fever chills    Does have diplopia  Blurry vision worse on the left than the right  Dysarthric speech  Dysphagia coughs when eating  Clumsy limbs worse on the left and worse in the legs and the arms  Poor gait ataxic uses a walker  Urinary incontinence and retention self caths    Otherwise review systems  negative  General exam  Blood pressure 142/75, pulse 85, temperature 97.1  HEENT significant for proptosis  Lungs decreased breath sounds bilaterally history of smoker possible COPD no wheezing  Heart rate regular  Abdomen soft positive bowel sounds  Symmetrical pulses  No edema the feet    Neurologic exam  Alert oriented x3  Prosody of speech dysarthric halting  Normal naming  Normal comprehension  Normal repetition  No aphasia  No neglect  Formal [place] memory testing 25 out of 30    Cranial nerves II through XII significant for  Unreactive left pupil  Ophthalmoplegia with poor eye saccades (also has proptosis)  Blurry vision worse in the left than the eye with monocular testing  No discrete visual field cut noted  Dysarthric speech tongue twisters thick  Face relatively symmetrical though    Upper extremities  Dysmetria with finger-nose  No specific drift    Lower extremities  Left iliopsoas slightly weaker than the right  Increased tone in the left lower extremity compared to the right but present bilaterally    Clumsy dysmetria bilateral lower extremities worse on the left than the right    Reflexes  Brisk in the upper extremities  Brisk at the knees with a little bit of overflow  Absent at the ankles  Toes equivocal  Negative Azevedo reflex    Gait  Needs both hands to stand up  With the four-wheel walker with hand brake has a spastic ataxic gait  Drags her left leg more so than the right  Turns are slow      Dysarthric speech  Unreactive left pupil optic pallor left greater than right  Dysmetria bilaterally          Assessment/plan    1.  Secondary progressive MS onset late 1980s with progressive MRI scan changes as above      2.  History of left eye herpes infection with visual loss/hyperthyroidism with proptosis    3.  Dysarthria/visual changes/gait difficulties paresthesias/bladder difficulties/mood difficulties/memory difficulties, as part of her MS and medical issues    4.  Urinary dysfunction from MS  follows with urology self caths every 4 to every 3 hours dexterity in hands enough to do the self cath still    5.  Decreased breath sounds history of continuing to smoke COPD discussed with patient cutting down or quitting smoking    Diagnosis  Secondary progressive MS  No longer on immune modulating medications  Originally diagnosed in the late 1980s    Plan  Formal physical therapy eval to see if we can maximize gait safety  Formal video swallow with speech path to see if we can adjust diet to avoid pneumonias  Continue to follow with urology with instructions for self cathing every 3-every 4 hours  Current medications filled by primary MD     Neurontin 100 mg tab, 2 tabs nightly     Imipramine 50 mg tab, 1 tab nightly     Oxybutynin ER 15 mg tab, 2 tabs nightly     Trazodone 50 mg nightly    See formal med list for other meds        As part of the exam  Review 27 pages of outside neurologic records  Reviewed multiple scan reports  Primary MD notes 4/22/2020    Follow-up in 4 months    Total care time today 78 minutes      Again, thank you for allowing me to participate in the care of your patient.        Sincerely,        Devendra Tabares MD

## 2021-08-04 NOTE — NURSING NOTE
Chief Complaint   Patient presents with     Multiple Sclerosis     Previous pt of Dr. Oliver.      Edith Em LPN on 8/4/2021 at 11:34 AM

## 2021-08-04 NOTE — PROGRESS NOTES
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  Has had hyperthyroidism in the past with proptosis    Has difficulty with swallowing coughs when she eats certain foods such as lettuce or meats that are hard to chew do not go down as well    Significant ataxia with left-sided weakness and clumsiness greater than right  Has a lift on the staircase    Significant urinary difficulties follows with urology is supposed to self cath every 3-4 hours    No longer drives    At home lives with her   Staircase has a motorized lift seat that she uses  As a walk-in shower with a shower bar  Has a hand-held sprayer  Uses a 4 wheeled walker with seat and hand brakes    Question whether they should switch out the seat in the shower so that she can use it as she thinks it is too small  Does follow with urology for her urinary retention and self caths about every 4 hours  Poor vision which also affects balance is going to be seeing the eye doctor shortly        Neurologic history summary  Diagnosed with multiple sclerosis in the late 1980s.  Presented with difficulty with gait falling down and bladder problems.  Work-up by Dr. Goins with lumbar puncture positive for MS  Treated with Copaxone in the past had some injection site irritation.  In 2005 had reports of some memory difficulties  Has had significant bladder control problems  Trouble with dysarthria and speech  Also had significant difficulty with fatigue  Has been totally disabled since 2000  Around 2009 seen at the [facility] Dr. Luis Antonio Bray, recommended a trial of Cytoxan patient chose not to  pursue that  Followed by Dr. Mukherjee from [id number]  Consider Tecfidera in 2015      Past medical history  Multiple sclerosis onset 1980s  Left eye herpetic infection with visual loss around   Urinary incontinence  Insomnia  Hyperlipidemia  Hyper thyroidism/proptosis  Restless leg syndrome  Depression  Low back pain      Habits  History of smoking  Does not use alcohol  Totally disabled since   Past driving restrictions, no freeway driving, no rush hour driving last evaluated 2017  No longer drives      Family history  Father with lung cancer and MI  at age 66  Mother with MI and high blood pressure  at age 73  Brother  in motor vehicle accident  Sister with cervical cancer      Work-up  MRI scan brain 2005 increasing plaque load compared to 1999 no active plaques  MRI brain 2007 moderate white matter changes stable compared to   MRI brain 2014, multiple new chronic lesions compared to previous scan no active lesions seen (compared to )  MRI scan brain 2015  A.  Diffuse volume loss and cerebral white matter changes consistent with multiple sclerosis  B.  No active lesions seen on contrast scan  [facility]  2021,     Exam    Review of systems pertinent positives and negatives otherwise see HPI  No headache no chest pain no shortness of breath no nausea vomiting no diarrhea no fever chills    Does have diplopia  Blurry vision worse on the left than the right  Dysarthric speech  Dysphagia coughs when eating  Clumsy limbs worse on the left and worse in the legs and the arms  Poor gait ataxic uses a walker  Urinary incontinence and retention self caths    Otherwise review systems negative  General exam  Blood pressure 142/75, pulse 85, temperature 97.1  HEENT significant for proptosis  Lungs decreased breath sounds bilaterally history of smoker possible COPD no wheezing  Heart rate regular  Abdomen soft positive bowel sounds  Symmetrical pulses  No edema  the feet    Neurologic exam  Alert oriented x3  Prosody of speech dysarthric halting  Normal naming  Normal comprehension  Normal repetition  No aphasia  No neglect  Formal [place] memory testing 25 out of 30    Cranial nerves II through XII significant for  Unreactive left pupil  Ophthalmoplegia with poor eye saccades (also has proptosis)  Blurry vision worse in the left than the eye with monocular testing  No discrete visual field cut noted  Dysarthric speech tongue twisters thick  Face relatively symmetrical though    Upper extremities  Dysmetria with finger-nose  No specific drift    Lower extremities  Left iliopsoas slightly weaker than the right  Increased tone in the left lower extremity compared to the right but present bilaterally    Clumsy dysmetria bilateral lower extremities worse on the left than the right    Reflexes  Brisk in the upper extremities  Brisk at the knees with a little bit of overflow  Absent at the ankles  Toes equivocal  Negative Azevedo reflex    Gait  Needs both hands to stand up  With the four-wheel walker with hand brake has a spastic ataxic gait  Drags her left leg more so than the right  Turns are slow      Dysarthric speech  Unreactive left pupil optic pallor left greater than right  Dysmetria bilaterally          Assessment/plan    1.  Secondary progressive MS onset late 1980s with progressive MRI scan changes as above      2.  History of left eye herpes infection with visual loss/hyperthyroidism with proptosis    3.  Dysarthria/visual changes/gait difficulties paresthesias/bladder difficulties/mood difficulties/memory difficulties, as part of her MS and medical issues    4.  Urinary dysfunction from MS follows with urology self caths every 4 to every 3 hours dexterity in hands enough to do the self cath still    5.  Decreased breath sounds history of continuing to smoke COPD discussed with patient cutting down or quitting smoking    Diagnosis  Secondary progressive MS  No longer on  immune modulating medications  Originally diagnosed in the late 1980s    Plan  Formal physical therapy eval to see if we can maximize gait safety  Formal video swallow with speech path to see if we can adjust diet to avoid pneumonias  Continue to follow with urology with instructions for self cathing every 3-every 4 hours  Current medications filled by primary MD     Neurontin 100 mg tab, 2 tabs nightly     Imipramine 50 mg tab, 1 tab nightly     Oxybutynin ER 15 mg tab, 2 tabs nightly     Trazodone 50 mg nightly    See formal med list for other meds        As part of the exam  Review 27 pages of outside neurologic records  Reviewed multiple scan reports  Primary MD notes 4/22/2020    Follow-up in 4 months    Total care time today 78 minutes    Addendum 8/13/2021  Swallow study 8/13/2021  No aspiration

## 2021-08-05 DIAGNOSIS — R00.0 TACHYCARDIA: ICD-10-CM

## 2021-08-05 DIAGNOSIS — E05.90 HYPERTHYROIDISM: ICD-10-CM

## 2021-08-05 RX ORDER — METOPROLOL SUCCINATE 25 MG/1
TABLET, EXTENDED RELEASE ORAL
Qty: 90 TABLET | Refills: 3 | OUTPATIENT
Start: 2021-08-05

## 2021-08-05 RX ORDER — METOPROLOL SUCCINATE 25 MG/1
25 TABLET, EXTENDED RELEASE ORAL DAILY
Qty: 90 TABLET | Refills: 0 | Status: SHIPPED | OUTPATIENT
Start: 2021-08-05 | End: 2021-10-25

## 2021-08-05 NOTE — TELEPHONE ENCOUNTER
"Requested Prescriptions   Pending Prescriptions Disp Refills     metoprolol succinate ER (TOPROL-XL) 25 MG 24 hr tablet [Pharmacy Med Name: METOPROLOL ER SUCCINATE 25MG TABS] 90 tablet 3     Sig: TAKE 1 TABLET BY MOUTH DAILY. GENERIC EQUIVALENT FOR TOPROL XL       Beta-Blockers Protocol Failed - 8/4/2021  3:49 PM        Failed - Blood pressure under 140/90 in past 12 months     BP Readings from Last 3 Encounters:   08/04/21 (!) 142/75   12/02/19 139/69   09/24/19 (!) 144/91                 Passed - Patient is age 6 or older        Passed - Recent (12 mo) or future (30 days) visit within the authorizing provider's specialty     Patient has had an office visit with the authorizing provider or a provider within the authorizing providers department within the previous 12 mos or has a future within next 30 days. See \"Patient Info\" tab in inbasket, or \"Choose Columns\" in Meds & Orders section of the refill encounter.              Passed - Medication is active on med list             "

## 2021-08-05 NOTE — TELEPHONE ENCOUNTER
"Requested Prescriptions   Pending Prescriptions Disp Refills     metoprolol succinate ER (TOPROL-XL) 25 MG 24 hr tablet [Pharmacy Med Name: METOPROLOL ER SUCCINATE 25MG TABS] 90 tablet 3     Sig: TAKE 1 TABLET BY MOUTH DAILY. GENERIC EQUIVALENT FOR TOPROL XL       Beta-Blockers Protocol Failed - 8/5/2021 11:33 AM        Failed - Blood pressure under 140/90 in past 12 months     BP Readings from Last 3 Encounters:   08/04/21 (!) 142/75   12/02/19 139/69   09/24/19 (!) 144/91                 Passed - Patient is age 6 or older        Passed - Recent (12 mo) or future (30 days) visit within the authorizing provider's specialty     Patient has had an office visit with the authorizing provider or a provider within the authorizing providers department within the previous 12 mos or has a future within next 30 days. See \"Patient Info\" tab in inbasket, or \"Choose Columns\" in Meds & Orders section of the refill encounter.              Passed - Medication is active on med list             "

## 2021-08-06 NOTE — TELEPHONE ENCOUNTER
Refilled once.  Needs to be seen in person for further refills.  Robb Cabrera MD  Family Medicine

## 2021-08-13 ENCOUNTER — HOSPITAL ENCOUNTER (OUTPATIENT)
Dept: SPEECH THERAPY | Facility: CLINIC | Age: 66
Setting detail: THERAPIES SERIES
End: 2021-08-13
Attending: PSYCHIATRY & NEUROLOGY
Payer: COMMERCIAL

## 2021-08-13 ENCOUNTER — HOSPITAL ENCOUNTER (OUTPATIENT)
Dept: GENERAL RADIOLOGY | Facility: CLINIC | Age: 66
Discharge: HOME | End: 2021-08-13
Attending: PSYCHIATRY & NEUROLOGY | Admitting: PSYCHIATRY & NEUROLOGY
Payer: COMMERCIAL

## 2021-08-13 DIAGNOSIS — R47.1 DYSARTHRIA: ICD-10-CM

## 2021-08-13 DIAGNOSIS — G35 MULTIPLE SCLEROSIS (H): ICD-10-CM

## 2021-08-13 DIAGNOSIS — R13.10 DYSPHAGIA, UNSPECIFIED TYPE: ICD-10-CM

## 2021-08-13 PROCEDURE — 92611 MOTION FLUOROSCOPY/SWALLOW: CPT | Mod: GN | Performed by: SPEECH-LANGUAGE PATHOLOGIST

## 2021-08-13 PROCEDURE — 74230 X-RAY XM SWLNG FUNCJ C+: CPT

## 2021-08-13 NOTE — PROGRESS NOTES
Impression: Patient presents to [facility] without her dentures and reports that she never wears them. Oral [facility] exam is largely unremarkable. Trials of thin, pudding and solids. No aspiration or penetration on any trial. No significant residue. Poor oral management of solid textures due to no teeth. Education was provided to the patient that wearing dentures would help with solid consistencies. Alternative compensatory strategies were offered should Patient prefer not to wear them. Small bites covered in gravy,  or included in mashed potatoes could help reduce instances of bolus sensation with meats. No problems identified which require further skilled intervention. Should the Patient continue to experience bolus and wish to pursue further treatment, Outpatient therapy is recommended to discuss managment strategies. Patient's symptoms could also be resolved further by getting better fitting dentures. Recommend continue with thin liquids and regular diet.   Video Swallow Study  08/13/21   General Information   Type Of Visit Initial   Start Of Care Date 08/13/21   Referring Physician Devendra Tabares MD   Orders Evaluate And Treat   Medical Diagnosis G35 (ICD-10-CM) - Multiple sclerosis (H); R13.10 (ICD-10-CM) - Dysphagia, unspecified type; R47.1 (ICD-10-CM) - Dysarthria   Pertinent History of Current Problem/OT: Additional Occupational Profile Info Patient is referred for VFSS due to difficulties swallowing meats, lettuce, and some breads. Patient did not bring her dentures to video swallow and reports she never uses them. Her  cuts up her meat very small. She is avoiding certain foods (e.g. eats chicken and fish mostly avoiding steak). She reports no difficulty with liquids. Her difficulty with solids is that they sometimes get stuck in her throat. She reports she puts her fingers in her throat and gags them back up. No history of choking reported. Patient has a PMH of MS.   Respiratory Status Room air    General Observations Patient is pleasant and cooperative.   Patient/family Goals to not have food get stuck in throat   General Information Comments Decision to continue with VFSS despite no dentures as the Patient reports she doesn't use them anyway and is in agreement to try a small piece of cracker with pudding.    Clinical Swallow Evaluation   Oral Musculature generally intact   Structural Abnormalities none present   Dentition upper and lower dentures;rarely or never uses dentures to eat   Mucosal Quality good   Mandibular Strength and Mobility intact   Oral Labial Strength and Mobility WFL   Lingual Strength and Mobility WFL   Velar Elevation intact   Buccal Strength and Mobility intact   Laryngeal Function Throat clear;Voicing initiated   Oral Musculature Comments slight right lip droop. Patient reports has had it for a while. No concerns identified in oral mech other than Patient eats solids without her dentures.   VFSS Eval: Radiology   Radiologist Dr. Dailey   Views Taken left lateral   Physical Location of Procedure [place], [place]   [facility]SS Eval: Thin Liquid Texture Trial   Mode of Presentation, Thin Liquid cup;self-fed   Order of Presentation 1, 2   Preparatory Phase WFL   Oral Phase, Thin Liquid WFL   Pharyngeal Phase, Thin Liquid WFL   Rosenbek's Penetration Aspiration Scale: Thin Liquid Trial Results 1 - no aspiration, contrast does not enter airway   Diagnostic Statement No aspiration or penetration. Swallow is WFL.   VFSS Eval: Puree Solid Texture Trial   Mode of Presentation, Puree spoon;self-fed   Order of Presentation 3, 4   Preparatory Phase WFL   Oral Phase, Puree WFL   Pharyngeal Phase, Puree WFL   Rosenbek's Penetration Aspiration Scale: Puree Food Trial Results 1 - no aspiration, contrast does not enter airway   Diagnostic Statement No significant residue noted, no penetration or aspiration. Patient did take smaller bites due to aversion to taste despite cueing.    VFSS Eval: Regular Texture Trial (Solid)   Mode of Presentation self-fed   Order of Presentation 5, 6   Preparatory Phase Insufficient mastication;Holding   Oral Phase WFL   Pharyngeal Phase WFL   Rosenbek's Penetration Aspiration Scale 1 - no aspiration, contrast does not enter airway   Diagnostic Statement Patient took small bite of cracker with pudding. Patient demonstrated diffiiculty breaking it up in her mouth and took a while to try and chew due to no teeth. No penetration or aspiration, no significant residue.    Educational Assessment   Barriers to Learning No barriers   Swallow Eval: Clinical Impressions   Skilled Criteria for Therapy Intervention No problems identified which require skilled intervention   Functional Assessment Scale (FAS) 6   Treatment Diagnosis Mild oral dysphagia due to no teeth   Diet texture recommendations Regular diet;Thin liquids (level 0)   Risks and Benefits of Treatment have been explained. Yes   Patient, family and/or staff in agreement with Plan of Care Yes   Clinical Impression Comments Patient presents to VFSS without her dentures and reports that she never wears them. Oral mech exam is largely unremarkable. Trials of thin, pudding and solids. No aspiration or penetration on any trial. No significant residue. Poor oral management of solid textures due to no teeth. Education was provided to the patient that wearing dentures would help with solid consistencies. Alternative compensatory strategies were offered should Patient prefer not to wear them. Small bites covered in gravy,  or included in mashed potatoes could help reduce instances of bolus sensation with meats. No problems identified which require further skilled intervention. Should the Patient continue to experience bolus and wish to pursue further treatment, Outpatient therapy is recommended to discuss managment strategies. Patient's symptoms could also be resolved further by getting better fitting dentures.  Recommend continue with thin liquids and regular diet.   Total Session Time   Total Evaluation Time 20   Therapy Certification   Certification date from 08/13/21   Certification date to 08/13/21   Medical Diagnosis MS, Dysphagia, Dysarthria   Certification I certify the need for these services furnished under this plan of treatment and while under my care.  (Physician co-signature of this document indicates review and certification of the therapy plan).

## 2021-08-16 ENCOUNTER — HOSPITAL ENCOUNTER (OUTPATIENT)
Dept: PHYSICAL THERAPY | Facility: CLINIC | Age: 66
Setting detail: THERAPIES SERIES
End: 2021-08-16
Attending: PSYCHIATRY & NEUROLOGY
Payer: COMMERCIAL

## 2021-08-16 DIAGNOSIS — G35 MULTIPLE SCLEROSIS (H): ICD-10-CM

## 2021-08-16 PROCEDURE — 97161 PT EVAL LOW COMPLEX 20 MIN: CPT | Mod: GP | Performed by: PHYSICAL THERAPIST

## 2021-08-16 PROCEDURE — 97110 THERAPEUTIC EXERCISES: CPT | Mod: GP | Performed by: PHYSICAL THERAPIST

## 2021-08-16 NOTE — PROGRESS NOTES
[facility]      OUTPATIENT PHYSICAL THERAPY EVALUATION  PLAN OF TREATMENT FOR OUTPATIENT REHABILITATION  (COMPLETE FOR INITIAL CLAIMS ONLY)  Patient's Last Name, First Name, M.I.  YOB: 1955  Tonya Rodriguez                        Provider's Name  [facility] Medical Record No.  [id number]                               Onset Date:     8/10/2021 Start of Care Date:    August 16, 2021       Type:     _X_PT   ___OT   ___SLP Medical Diagnosis:   Multiple Sclerosis                        PT Diagnosis:     Unsteady gait  Visits from SOC:  1   _________________________________________________________________________________  Plan of Treatment/Functional Goals    Planned Interventions:  gait training;balance training;ROM;strengthening;stretching     Goals: See Physical Therapy Goals on Care Plan in [facility] electronic health record.     GOALS   PT Eval Goals 1;2;3   Goal 1   Goal Identifier 1   Goal Description Patient will be able to stand for 90 seconds unsupported in order to brush her teeth   Target Date 09/06/21   Goal 2   Goal Identifier 2   Goal Description Patient will improve TUG score from 52.8 seconds to under 48 seconds indicating decreased fall risk    Target Date 09/27/21   Goal 3   Goal Identifier Patient will be independent and compliant with hep    Goal Progress w+8     Therapy Frequency:  1x/week  Predicted Duration of Therapy Intervention:  8 weeks  _________________________________________________________________________________    I CERTIFY THE NEED FOR THESE SERVICES FURNISHED UNDER        THIS PLAN OF TREATMENT AND WHILE UNDER MY CARE     (Physician co-signature of this document indicates review and certification of the therapy plan).                  Initial Assessment        See Physical Therapy evaluation dated   in Epic electronic health  record.  Alicia Spivey, PT on 8/16/2021 at 2:43 PM

## 2021-08-16 NOTE — PROGRESS NOTES
08/16/21 1200   Quick Adds   Quick Adds Certification   Type of Visit Initial OP PT Evaluation   General Information   Start of Care Date 08/16/21   Referring Physician Devendra Garcia M.D.    Orders Evaluate and Treat as Indicated   Order Date 08/10/21   Medical Diagnosis Multiple Sclerosis    Onset of illness/injury or Date of Surgery 08/10/21  (order date)   Surgical/Medical history reviewed Yes   Pertinent history of current problem (include personal factors and/or comorbidities that impact the POC) Patient is a 66 year old female with current complaints of left lower extremity weakness, falls, and difficulty walking. Patient diagnosed with MS in the late 1980s. Feels like her strength and balance has gotten worse over the last 7-9 months. Uses a 4ww at home,chairlifts on both second story and basement steps, and only ambulates household distances. Frequently falls when she is reaching for objects. Becomes fatigued easily, and finds she needs frequent breaks. Known diplopia, blurriness in left eye- has appointment with optometrist beginning of September.     Pertinent Visual History  Diplopia, blurriness in left eye    Current Community Support Family/friend caregiver;Housekeeping service   Patient role/Employment history Retired   Living environment [place]   Current Assistive Devices Four Wheeled Walker;Standard Cane;Quad Cane   Patient/Family Goals Statement Fall less   Fall Risk Screen   Fall screen completed by PT   Have you fallen 2 or more times in the past year? Yes   Have you fallen and had an injury in the past year? No   Timed Up and Go score (seconds) 52.8 seconds    Is patient a fall risk? Yes   Abuse Screen (yes response referral indicated)   Feels Unsafe at Home or Work/School no   Feels Threatened by Someone no   Does Anyone Try to Keep You From Having Contact with Others or Doing Things Outside Your Home? no   Physical Signs of Abuse Present no   Cognitive Status Examination    Orientation orientation to person, place and time   Level of Consciousness alert   Memory impaired  (Per patient, does have some memory deficits )   Posture   Posture Forward head position   Strength   Manual Muscle Testing Quick Adds MMT: Hip;MMT: Knee;MMT: Ankle   MMT: Hip, Rehab Eval   Hip Flexion - Left Side (2/5) poor, left   Hip ABduction - Left Side (3/5) fair, left   Hip ADduction - Left Side (2+/5) poor plus, left   Hip Flexion - Right Side (4-/5) good minus, right   Hip ABduction - Right Side (4/5) good, right   Hip ADduction - Right Side (4/5) good, right   MMT: Knee, Rehab Eval   Knee Flexion - Left Side (3/5) fair, left   Knee Extension - Left Side (2+/5) poor plus, left   Knee Flexion - Right Side (4/5) good, right   Knee Extension - Right Side (4/5) good, right   MMT: Ankle, Rehab Eval   Ankle Dorsiflexion - Left Side (3/5) fair, left   Ankle Plantarflexion - Left Side other (see comments)  (2/5 in sitting position. unable to in standing )   Ankle Dorsiflexion - Right Side (4/5) good, right   Ankle Plantarflexion - Right Side (4/5) good, right   Gait   Gait Gait Skill;Gait Analysis   Gait Skills   Level of [place]: Gait stand-by assist   Physical Assist/Nonphysical Assist: Gait supervision   Gait Analysis   Gait Pattern Used swing-to gait   Gait Deviations Noted increased time in double stance;decreased step length;increased stride width;decreased toe-to-floor clearance   Impairments Contributing to Gait Deviations impaired balance;impaired coordination;decreased flexibility;impaired motor control;decreased strength   Balance   Balance other (describe)   Balance Quick Add Sitting balance: dynamic;Sit to stand balance;Standing balance: static   Balance Special Tests   Balance Special Tests Timed up and go;Modified CTSIB Conditions   Balance Special Tests Timed Up and Go   Seconds 52.8 Seconds   Comments fww    Balance Special Tests Modified CTSIB Conditions   Condition 1, seconds 20 Seconds    Condition 2, seconds 0 Seconds   Condition 4, seconds 2 Seconds   Condition 5, seconds 0 Seconds   Modified CTSIB Comments Very fearful eyes closed, unable to attempt    Planned Therapy Interventions   Planned Therapy Interventions gait training;balance training;ROM;strengthening;stretching   Clinical Impression   Criteria for Skilled Therapeutic Interventions Met yes, treatment indicated   PT Diagnosis Unsteady gait   Influenced by the following impairments decreased strength, impaired range of motion, impaired coordination, abnormal gait and balance   Functional limitations due to impairments safe and independent ambulation, standing adls=   Clinical Presentation Stable/Uncomplicated   Clinical Presentation Rationale clinical reasoning    Clinical Decision Making (Complexity) Moderate complexity   Therapy Frequency 1 time/week   Predicted Duration of Therapy Intervention (days/wks) 8 weeks    Risk & Benefits of therapy have been explained Yes   Patient, Family & other staff in agreement with plan of care Yes   Clinical Impression Comments Patient is a amanda 66 year old female presenting today with impaired strength, coordination, balance, and gait mechanics that are impacting her safety and ability to perform standing adls and ambulate.  Patient's condition is complicated by chronic and progressive nature of diagnosis . She would benefit from additional skilled physical therapy to maximize safety, and address the above impairments.    Education Assessment   Preferred Learning Style Listening;Reading;Demonstration;Pictures/video   Barriers to Learning No barriers   GOALS   PT Eval Goals 1;2;3   Goal 1   Goal Identifier 1   Goal Description Patient will be able to stand for 90 seconds unsupported in order to brush her teeth   Target Date 09/06/21   Goal 2   Goal Identifier 2   Goal Description Patient will improve TUG score from 52.8 seconds to under 48 seconds indicating decreased fall risk    Target Date  09/27/21   Goal 3   Goal Identifier Patient will be independent and compliant with hep    Goal Progress w+8   Total Evaluation Time   PT Eval, Low Complexity Minutes (11604) 25   Therapy Certification   Certification date from 08/16/21   Certification date to 10/11/21   Medical Diagnosis Multiple Sclerosis    Certification I certify the need for these services furnished under this plan of treatment and while under my care.  (Physician co-signature of this document indicates review and certification of the therapy plan).   Alicia Spivey, PT on 8/16/2021 at 2:36 PM

## 2021-08-26 ENCOUNTER — HOSPITAL ENCOUNTER (OUTPATIENT)
Dept: PHYSICAL THERAPY | Facility: CLINIC | Age: 66
Setting detail: THERAPIES SERIES
End: 2021-08-26
Attending: PSYCHIATRY & NEUROLOGY
Payer: COMMERCIAL

## 2021-08-26 PROCEDURE — 97110 THERAPEUTIC EXERCISES: CPT | Mod: GP | Performed by: PHYSICAL THERAPIST

## 2021-08-31 ENCOUNTER — HOSPITAL ENCOUNTER (OUTPATIENT)
Dept: PHYSICAL THERAPY | Facility: CLINIC | Age: 66
Setting detail: THERAPIES SERIES
End: 2021-08-31
Attending: PSYCHIATRY & NEUROLOGY
Payer: COMMERCIAL

## 2021-08-31 PROCEDURE — 97112 NEUROMUSCULAR REEDUCATION: CPT | Mod: GP | Performed by: PHYSICAL THERAPIST

## 2021-08-31 PROCEDURE — 97110 THERAPEUTIC EXERCISES: CPT | Mod: GP | Performed by: PHYSICAL THERAPIST

## 2021-09-05 ENCOUNTER — HEALTH MAINTENANCE LETTER (OUTPATIENT)
Age: 66
End: 2021-09-05

## 2021-09-23 ENCOUNTER — HOSPITAL ENCOUNTER (OUTPATIENT)
Dept: PHYSICAL THERAPY | Facility: CLINIC | Age: 66
Setting detail: THERAPIES SERIES
End: 2021-09-23
Attending: PSYCHIATRY & NEUROLOGY
Payer: COMMERCIAL

## 2021-09-23 PROCEDURE — 97110 THERAPEUTIC EXERCISES: CPT | Mod: GP | Performed by: PHYSICAL THERAPIST

## 2021-09-23 PROCEDURE — 97112 NEUROMUSCULAR REEDUCATION: CPT | Mod: GP | Performed by: PHYSICAL THERAPIST

## 2021-09-30 ENCOUNTER — HOSPITAL ENCOUNTER (OUTPATIENT)
Dept: PHYSICAL THERAPY | Facility: CLINIC | Age: 66
Setting detail: THERAPIES SERIES
End: 2021-09-30
Attending: PSYCHIATRY & NEUROLOGY
Payer: COMMERCIAL

## 2021-09-30 PROCEDURE — 97112 NEUROMUSCULAR REEDUCATION: CPT | Mod: GP | Performed by: PHYSICAL THERAPIST

## 2021-09-30 PROCEDURE — 97110 THERAPEUTIC EXERCISES: CPT | Mod: GP | Performed by: PHYSICAL THERAPIST

## 2021-10-19 NOTE — PROGRESS NOTES
INITIAL NEUROLOGY CONSULTATION    DATE OF VISIT: 3/13/2019  MRN: [id number]  PATIENT NAME: Tonya Rodriguez  YOB: 1955    REFERRING PROVIDER: Referred Self    Chief Complaint   Patient presents with     New Patient     MS.  Self-referral.  Previously followed by Dr. Mukherjee.         SUBJECTIVE:                                                      HPI:   Tonya Rodriguze is a 63 year old female self-referred for Multiple Sclerosis. Per chart review, she has seen multiple neurologists over the years. Most recently, she was seeing Dr. Mukherjee in his [place] office. Per his March 27, 2017 note, the patient was stable. She has some ongoing problems with bladder control for which she is on oxybutynin. He referred to her MS as secondarily-progressive at that point. He wanted her to have a repeat driving evaluation done. It does not appear that she was on any DMTs at the time of that visit.     Looking further back in the chart, she was on Copaxone in the past    The patient says she was diangosed in the [id number]'s in the setting of falls. Notes reflect this too, with positive LP testing for MS around that time.  She is not sure if she has ever had any vision changes. She has experienced numbness in the legs and arms. This resolved for the most part except for some tingling and numbness in the feet. She did have some flares in the early 1990s. She had hallucinations with IV steroids. She says that since she has not had any flares. I note that she was on amantadine in the past.    She says she was on Copaxone for a long time and another injectable prior to that. She had reactions to the latter and then the Copaxone became too expensive. She has not been on oral DMTs.     She was meant to see Dr. Mukherjee annually. She was supposed to see him a couple of months ago but he got delayed in clinic and she left.    She mentions that she has more recently been diagnosed with Grave's disease. She saw Dr. Amaral for this and  Radha Guevara Patient Age: 73 year old  MESSAGE: Interpreting service used: No      IM/FP- Orders- Order Request-      Type of order being requested: Colonoscopy-      Reason order is needed:  Please advise if patient is due for Colonoscopy.      Is the patient currently having any symptoms? No- Route message to provider's clinical pool.             ALLERGIES:  Nickel, Indomethacin, Latex, Naproxen, and Propoxyphene  Current Outpatient Medications   Medication   • amphetamine-dextroamphetamine (ADDERALL XR) 20 MG 24 hr capsule   • fluoxetine (PROzac) 40 MG capsule   • buPROPion XL (WELLBUTRIN XL) 300 MG 24 hr tablet   • atorvastatin (LIPITOR) 20 MG tablet   • lisinopril (ZESTRIL) 20 MG tablet   • ferrous sulfate 325 (65 FE) MG EC tablet   • albuterol (Ventolin HFA) 108 (90 Base) MCG/ACT inhaler   • alendronate (FOSAMAX) 70 MG tablet   • Respiratory Therapy Supplies (NEBULIZER) Device   • Loratadine 10 MG Cap   • albuterol (VENTOLIN) (2.5 MG/3ML) 0.083% nebulizer solution   • Spacer/Aero-Holding Chambers Device     No current facility-administered medications for this visit.     PHARMACY to use: Shown below          Pharmacy preference(s) on file:   [id number] - [place], [place] - [address] [facility] AT [facility] & [address] [place] [address]  [address] [facility] [address]  Phone: [phone] Fax: [phone]      CALL BACK INFO: Ok to leave response (including medical information) with family member or on answering machine      PCP: Stoney Ramachandran PA-C         INS: Payor: MEDICARE / Plan: PARTA AND B / Product Type: MEDICARE   PATIENT ADDRESS:  [address] [place] [id number]     she is doing better. She does notice some blurred vision, not entirely new but also a lesion on her RIGHT eyelid that does not itch or hurt but is irritating the eye. This is new. 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 She saw Dr. Oscar earlier this month and was told to call if any concerns arise.      Positional tremor noted in 2006. No concerns about memory today. This has come up in the past.     Additional history: she was seen by Dr. Bray in 2008. His note indicates the diagnosis was made through [place] in 1995, with symptoms for years prior. She developed Left ON. She had side effects on Betaseron, then started the Copaxone. He mentioned that he felt the disease was secondarily progressive. He gave her clonazepam for PLMS. He was also concerned that the Depakote she was on for mood could be worsening her tremor. They discussed Cytoxan but there was also the concern for history of herpes keratitis. She was going to think about the medication. She followed up with Dr Adair again a couple of years later and had decided against the medication.     Brain MRIs have been stable. Patient is not on Vitamin D.     Past Medical History:   Diagnosis Date     Allergic rhinitis, cause unspecified 6/19/2005    nasonax     Chest pain, unspecified 3/22/04    10/10 initially inseverity - responded to Aspirin and two doses of Nitroglycerin sublingual      Depressive disorder, not elsewhere classified 1/29/2007    on buspar     Herpes simplex with other ophthalmic complications     on valtrex     Lipoma of unspecified site     Lower left suprascapular     Migraine, unspecified, with intractable migraine, so stated, without mention of status migrainosus 1/20/2007    midrin - about 30 pill a year     Multiple sclerosis (H) dx in 1980's     Personal history of other disorder of urinary system     self caths - recurrent UTIs     Past Surgical History:   Procedure Laterality Date     APPENDECTOMY  1980's    Retained suture      "ARTHROPLASTY HIP  9/19/2012    Procedure: ARTHROPLASTY HIP;  Right Total Hip Minimally Invasive Surgery;  Surgeon: Devendra Douglas MD;  Location: [facility]     ARTHROSCOPY KNEE RT/LT  1989    Arthroscopy of the Left Knee     BUNIONECTOMY RT/LT      Bilateral bunionectomies x4 surgeries     EXCISE LESION TRUNK N/A 4/17/2015    Procedure: EXCISE LESION TRUNK;  Surgeon: Wander Gutierrez MD;  Location: [facility] OR     SURGICAL HISTORY OF -   1974, 1977    two normal deliveries     TUBAL LIGATION  1981-82         Current Outpatient Medications on File Prior to Visit:  gabapentin (NEURONTIN) 100 MG capsule Take 2 capsules (200 mg) by mouth At Bedtime   imipramine (TOFRANIL) 50 MG tablet Take 1 tablet (50 mg) by mouth At Bedtime   methimazole (TAPAZOLE) 10 MG tablet Take 2-tablets by mouth daily as directed   metoprolol succinate ER (TOPROL-XL) 25 MG 24 hr tablet Take 1 tablet (25 mg) by mouth daily   oxybutynin ER (DITROPAN XL) 15 MG 24 hr tablet Take 1 tablet (15 mg) by mouth 2 times daily   traZODone (DESYREL) 50 MG tablet Take 1 tablet (50 mg) by mouth At Bedtime   valACYclovir (VALTREX) 500 MG tablet Take 1 tablet (500 mg) by mouth daily   ORDER FOR DME Equipment being ordered: urinary catheter 6 times daily   simvastatin (ZOCOR) 20 MG tablet Take 1 tablet (20 mg) by mouth At Bedtime (Patient not taking: Reported on 3/13/2019)   [DISCONTINUED] oxybutynin (DITROPAN-XL) 10 MG 24 hr tablet Take 3 tablets (30 mg) by mouth At Bedtime     No current facility-administered medications on file prior to visit.   Allergies   Allergen Reactions     Cipro [Ciprofloxacin] Rash     gets yeast infections - BAD with.     Lactulose GI Disturbance     Caused Vomiting        Problem (# of Occurrences) Relation (Name,Age of Onset)    Alcohol/Drug (1) Brother (ed)    Cancer (2) Father: lung, Sister: cervical    Coronary Artery Disease (1) Brother (Romie): injury \" maker\" tree fell on him    Diabetes (1) Father    Genitourinary " Problems (1) Brother (Vasu): stones    Heart Disease (4) Mother, Father, Maternal Grandfather, Son: mummer    Hypertension (1) Mother    Migraines (1) Mother       Negative family history of: Glaucoma, Macular Degeneration, Aneurysm, Brain Hemorrhage, Seizure Disorder, Cerebrovascular Disease, Depression        Social History     Tobacco Use     Smoking status: Current Every Day Smoker     Packs/day: 1.00     Years: 45.00     Pack years: 45.00     Types: Cigarettes     Smokeless tobacco: Never Used   Substance Use Topics     Alcohol use: No     Drug use: No       REVIEW OF SYSTEMS:                                                      10-point review of systems otherwise negative except as mentioned above in HPI.   EXAM:                                                      Physical Exam:   Vitals: /80 (BP Location: Right arm, Patient Position: Sitting, Cuff Size: Adult Regular)   Pulse 72   Temp 97.3  F (36.3  C) (Tympanic)   Resp 20   BMI= There is no height or weight on file to calculate BMI.  GENERAL: NAD.   HEENT: Exophthalmos. Left lens is clouded peripherally.  CV: RRR. S1, S2.   NECK: No bruits.  Neurologic:  MENTAL STATUS: Alert, attentive. Speech is slightly dysarthric. Normal comprehension. Mini-co/5 (missed one word on recall). Normal concentration. Adequate fund of knowledge.   CRANIAL NERVES: Discs technically difficult to visualize due to eye movement.. Visual fields intact to confrontation. Left pupil is slightly smaller than the Right, reactive. Facial sensation and movement normal. EOM appear full, but skewed by exophthalmos. Hearing intact to conversation. Trapezius strength intact. Palate moves symmetrically. Tongue midline.  MOTOR: 5/5 in proximal and distal muscle groups of upper and lower extremities. Tone and bulk normal.   DTRs: Brisk throughout.  Babinski equivocal. Babinski down going.   SENSATION: Normal light touch and pinprick. Intact proprioception. Vibration: Diminished at  both ankles.   COORDINATION: Slow finger nose finger and finger tapping. Knee heel shin normal.  STATION AND GAIT: Romberg positive. Gait is cautious.   Tremor: Axial and hands, variable amplitude.    Relevant Data:  MRI Brain (11.5.15):  IMPRESSION:   1. Diffuse cerebral volume loss and cerebral white matter changes  consistent with the patient's history of multiple sclerosis. No new  lesion since the comparison study. No contrast enhancement in any of  the existing lesions to suggest any areas of active demyelination.  2. Otherwise, normal brain MRI. No evidence for acute intracranial  pathology.     Imaging reviewed by me. Agree with radiology read.     ASSESSMENT and PLAN:                                                      Assessment and Plan:      ICD-10-CM    1. MS (multiple sclerosis) (H) G35 MR Brain w/o & w Contrast     MR Cervical Spine w/o & w Contrast   2. Vitamin D deficiency E55.9 Vitamin D Deficiency        Ms. Rodriguez is a pleasant 62 yo with Graves Disease here to establish care for secondarily-progressive MS. Most recent imaging and patient's subjective symptoms have been stable over the past few years despite no disease-modifying treatments. I would like to repeat a brain and add a cervical MRI. We will not make any medication changes for now, as per patient preference. We may want to revisit an immune modulator in the future if the imaging shows disease progression. I will see her back in 6 months. We will check her vitamin D level today. The patient understands and agrees with the plan.     Patient Instructions:  -- Labs today: Vitamin D level.   -- No new medications for the MS today.   -- I do recommend repeat brain MRI and cervical MRI (ok to wait until the weather improves). We will notify you of the results.   -- Give Dr. Oscar a call about the eye irritation, blurriness. You do have a small lesion on your Right lower eyelid that looks like it could be a stye, but does not appear infected.    -- Return to Neurology clinic in 6 months.     Total Time: 60 minutes were spent with the patient and in chart review. More than 50% of the time spent on counseling (as described above in Assessment and Plan/Instructions) /coordinating the care.    Marci Ward MD  Neurology    CC: Robb Cabrera MD

## 2021-10-21 ENCOUNTER — MYC MEDICAL ADVICE (OUTPATIENT)
Dept: FAMILY MEDICINE | Facility: CLINIC | Age: 66
End: 2021-10-21

## 2021-10-21 DIAGNOSIS — E05.90 HYPERTHYROIDISM: ICD-10-CM

## 2021-10-21 DIAGNOSIS — R00.0 TACHYCARDIA: ICD-10-CM

## 2021-10-21 RX ORDER — METOPROLOL SUCCINATE 25 MG/1
TABLET, EXTENDED RELEASE ORAL
Qty: 90 TABLET | Refills: 0 | OUTPATIENT
Start: 2021-10-21

## 2021-10-21 NOTE — TELEPHONE ENCOUNTER
Please call patient,  I have denied her prescription.  She has not been seen in 18 months.  She has been alerted to follow up in clinic.  Please call her.  Robb Cabrera MD  Family Medicine

## 2021-10-21 NOTE — TELEPHONE ENCOUNTER
Pending Prescriptions:                       Disp   Refills    metoprolol succinate ER (TOPROL-XL) 25 MG*90 tab*0            Sig: TAKE 1 TABLET BY MOUTH DAILY. PLEASE MAKE A           CLINIC VISIT TO BE SEEN IN PERSON FOR MEDICATION           REFILLS    Routing refill request to provider for review/approval because:  Labs out of range:    BP Readings from Last 3 Encounters:   08/04/21 (!) 142/75   12/02/19 139/69   09/24/19 (!) 144/91       Alexis Bryson RN

## 2021-10-21 NOTE — TELEPHONE ENCOUNTER
Tonya is scheduled to see you on Dec.24.(soonest available appt)  She is needing pills until she can see you.  She is out of Metoprolol succiante ER.    Thank you    Madina Jones on 10/21/2021 at 5:18 PM

## 2021-10-22 NOTE — TELEPHONE ENCOUNTER
There is another encounter open re: refill-RN switched pharmacy in that request to [facility] and sent back to PCP, and MyChart sent.     Ana Barrios RN  [facility]

## 2021-10-25 RX ORDER — METOPROLOL SUCCINATE 25 MG/1
25 TABLET, EXTENDED RELEASE ORAL DAILY
Qty: 90 TABLET | Refills: 0 | Status: SHIPPED | OUTPATIENT
Start: 2021-10-25 | End: 2021-12-24

## 2021-10-31 ENCOUNTER — HEALTH MAINTENANCE LETTER (OUTPATIENT)
Age: 66
End: 2021-10-31

## 2021-12-08 ENCOUNTER — OFFICE VISIT (OUTPATIENT)
Dept: NEUROLOGY | Facility: CLINIC | Age: 66
End: 2021-12-08
Payer: COMMERCIAL

## 2021-12-08 VITALS
SYSTOLIC BLOOD PRESSURE: 127 MMHG | DIASTOLIC BLOOD PRESSURE: 74 MMHG | HEART RATE: 68 BPM | WEIGHT: 155.2 LBS | OXYGEN SATURATION: 97 % | BODY MASS INDEX: 22.92 KG/M2

## 2021-12-08 DIAGNOSIS — G35 MULTIPLE SCLEROSIS (H): Primary | ICD-10-CM

## 2021-12-08 PROCEDURE — 99214 OFFICE O/P EST MOD 30 MIN: CPT | Performed by: PSYCHIATRY & NEUROLOGY

## 2021-12-08 NOTE — LETTER
12/8/2021         RE: Tonya Rodriguez  [address]  [facility] [address]        Dear Colleague,    Thank you for referring your patient, Tonya Rodriguez, to the [facility] [place]. Please see a copy of my visit note below.    In person visit    HPI  8/4/2021, in person consultation  12/8/2021, in person visit    66-year-old being followed neurologically for:  Secondary progressive MS  Diagnosis in the late 1980s  Previously treated with Copaxone  Now off all immunosuppressive therapy times a couple of years    Patient passed her swallow study in August  Still need to be careful  Lives on 1 level now  Did get a bed that elevates so that it is easier for her to sleep at night  Uses oxybutynin at nighttime so she does not have to get up to urinate frequently did talk about bedside commode in the future if necessary    Has a motorized scooter to get around inside the home    When she goes to appointments though this is too heavy to transport to visits such as medical visits    Will write a prescription for medical durable equipment  Lightweight collapsible wheelchair self propel  For use to get to medical appointments  Diagnosis multiple sclerosis  Severe ataxia high risk for falls and injury        Neurologic functional summary August 2021  Patient is establishing neurologic care  Does have some difficulty with diplopia  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  Has had hyperthyroidism in the past with proptosis    Has difficulty with swallowing coughs when she eats certain foods such as lettuce or meats that are hard to chew do not go down as well    Significant ataxia with left-sided weakness and clumsiness greater than right  Has a lift on the staircase    Significant urinary difficulties follows with urology is supposed to self cath every 3-4 hours    No longer drives    At home lives with her   Staircase has a motorized lift seat that she uses  As a  walk-in shower with a shower bar  Has a hand-held sprayer  Uses a 4 wheeled walker with seat and hand brakes  Does have a powered motorized scooter but it is too heavy to transport outside the home for visits    Question whether they should switch out the seat in the shower so that she can use it as she thinks it is too small  Does follow with urology for her urinary retention and self caths about every 4 hours  Poor vision which also affects balance is going to be seeing the eye doctor shortly        Neurologic history summary  Diagnosed with multiple sclerosis in the late .  Presented with difficulty with gait falling down and bladder problems.  Work-up by Dr. Goins with lumbar puncture positive for MS  Treated with Copaxone in the past had some injection site irritation.  In  had reports of some memory difficulties  Has had significant bladder control problems  Trouble with dysarthria and speech  Also had significant difficulty with fatigue  Has been totally disabled since   Around  seen at the [facility] Dr. Luis Antonio Bray, recommended a trial of Cytoxan patient chose not to pursue that  Followed by Dr. Mukherjee from [id number]  Consider Tecfidera in       Past medical history  Multiple sclerosis onset   Left eye herpetic infection with visual loss around   Urinary incontinence  Insomnia  Hyperlipidemia  Hyper thyroidism/proptosis  Restless leg syndrome  Depression  Low back pain      Habits  History of smoking  Does not use alcohol  Totally disabled since   Past driving restrictions, no freeway driving, no rush hour driving last evaluated 2017  No longer drives      Family history  Father with lung cancer and MI  at age 66  Mother with MI and high blood pressure  at age 73  Brother  in motor vehicle accident  Sister with cervical cancer      Work-up  MRI scan brain 2005 increasing plaque load compared to 1999 no active plaques  MRI brain January  2007 moderate white matter changes stable compared to 2005  MRI brain November 2014, multiple new chronic lesions compared to previous scan no active lesions seen (compared to 2010)  MRI scan brain November 2015  A.  Diffuse volume loss and cerebral white matter changes consistent with multiple sclerosis  B.  No active lesions seen on contrast scan  Swallow study 8/13/2021  No aspiration    MoCA  8/4/2021, 25/30    Exam    Review of system  Pertinent positives and negatives    No headache no chest pain no shortness of breath no nausea vomiting no diarrhea no fever chills    Does have diplopia  Blurry vision worse on the left than the right chronic  Dysarthric speech  Dysphagia coughs when eating  Clumsy limbs worse on the left and worse in the legs and the arms  Poor gait ataxic uses a walker  Urinary incontinence and retention self caths    Otherwise review systems negative  General exam  Blood pressure 127/74, pulse 68, temperature 97.1  HEENT significant for proptosis  Lungs decreased breath sounds bilaterally history of smoker possible COPD no wheezing  Heart rate regular  Abdomen soft positive bowel sounds  Symmetrical pulses  No edema the feet    Neurologic exam  Alert oriented x3  Prosody of speech dysarthric halting  Normal naming  Normal comprehension  Normal repetition  No aphasia  No neglect  Formal Smyth memory testing 25 out of 30    Cranial nerves II through XII significant for  Unreactive left pupil  Ophthalmoplegia with poor eye saccades (also has proptosis)  Blurry vision worse in the left than the eye with monocular testing  No discrete visual field cut noted  Dysarthric speech tongue twisters thick  Face relatively symmetrical though  Dysarthric speech  Unreactive left pupil optic pallor left greater than right  Dysmetria bilaterally        Upper extremities  Dysmetria with finger-nose  No specific drift    Lower extremities  Left iliopsoas slightly weaker than the right  Increased tone in the left  lower extremity compared to the right but present bilaterally    Clumsy dysmetria bilateral lower extremities worse on the left than the right    Reflexes  Brisk in the upper extremities  Brisk at the knees with a little bit of overflow  Absent at the ankles  Toes equivocal  Negative Azevedo reflex    Gait  Needs both hands to stand up  With the four-wheel walker with hand brake has a spastic ataxic gait  Drags her left leg more so than the right  Turns are slow            Assessment/plan    1.  Secondary progressive MS onset late 1980s with progressive MRI scan changes as above      2.  History of left eye herpes infection with visual loss/hyperthyroidism with proptosis    3.  Dysarthria/visual changes/gait difficulties paresthesias/bladder difficulties/mood difficulties/memory difficulties, as part of her MS and medical issues    4.  Urinary dysfunction from MS follows with urology self caths every 4 to every 3 hours dexterity in hands enough to do the self cath still    5.  Decreased breath sounds history of continuing to smoke COPD discussed with patient cutting down or quitting smoking    Diagnosis  Secondary progressive MS  No longer on immune modulating medications  Originally diagnosed in the late 1980s    Plan  Formal physical therapy eval to see if we can maximize gait safety  Formal video swallow with speech path to see if we can adjust diet to avoid pneumonias  Continue to follow with urology with instructions for self cathing every 3-every 4 hours  Current medications filled by primary MD     Neurontin 100 mg tab, 2 tabs nightly     Imipramine 50 mg tab, 1 tab nightly     Oxybutynin ER 15 mg tab, 2 tabs nightly     Trazodone 50 mg nightly    See formal med list for other meds    Reviewed gait instability  Wrote prescription for lightweight collapsible portable self-propelled wheelchair to use outside the home for transport to medical visits  This is due to significant risk for falls with her ataxia from  her secondary progressive MS    Discussed and reviewed the above  Follow-up in 6 months    Total care time today 32 minutes          Again, thank you for allowing me to participate in the care of your patient.        Sincerely,        Devendra Tabares MD

## 2021-12-08 NOTE — PROGRESS NOTES
In person visit    HPI  8/4/2021, in person consultation  12/8/2021, in person visit    66-year-old being followed neurologically for:  Secondary progressive MS  Diagnosis in the late 1980s  Previously treated with Copaxone  Now off all immunosuppressive therapy times a couple of years    Patient passed her swallow study in August  Still need to be careful  Lives on 1 level now  Did get a bed that elevates so that it is easier for her to sleep at night  Uses oxybutynin at nighttime so she does not have to get up to urinate frequently did talk about bedside commode in the future if necessary    Has a motorized scooter to get around inside the home    When she goes to appointments though this is too heavy to transport to visits such as medical visits    Will write a prescription for medical durable equipment  Lightweight collapsible wheelchair self propel  For use to get to medical appointments  Diagnosis multiple sclerosis  Severe ataxia high risk for falls and injury        Neurologic functional summary August 2021  Patient is establishing neurologic care  Does have some difficulty with diplopia  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  Has had hyperthyroidism in the past with proptosis    Has difficulty with swallowing coughs when she eats certain foods such as lettuce or meats that are hard to chew do not go down as well    Significant ataxia with left-sided weakness and clumsiness greater than right  Has a lift on the staircase    Significant urinary difficulties follows with urology is supposed to self cath every 3-4 hours    No longer drives    At home lives with her   Staircase has a motorized lift seat that she uses  As a walk-in shower with a shower bar  Has a hand-held sprayer  Uses a 4 wheeled walker with seat and hand brakes  Does have a powered motorized scooter but it is too heavy to transport outside the home for visits    Question whether they should switch out the seat in the  shower so that she can use it as she thinks it is too small  Does follow with urology for her urinary retention and self caths about every 4 hours  Poor vision which also affects balance is going to be seeing the eye doctor shortly        Neurologic history summary  Diagnosed with multiple sclerosis in the late .  Presented with difficulty with gait falling down and bladder problems.  Work-up by Dr. Goins with lumbar puncture positive for MS  Treated with Copaxone in the past had some injection site irritation.  In  had reports of some memory difficulties  Has had significant bladder control problems  Trouble with dysarthria and speech  Also had significant difficulty with fatigue  Has been totally disabled since   Around  seen at the [facility] Dr. Luis Antonio Bray, recommended a trial of Cytoxan patient chose not to pursue that  Followed by Dr. Mukherjee from [id number]  Consider Tecfidera in       Past medical history  Multiple sclerosis onset 1980s  Left eye herpetic infection with visual loss around   Urinary incontinence  Insomnia  Hyperlipidemia  Hyper thyroidism/proptosis  Restless leg syndrome  Depression  Low back pain      Habits  History of smoking  Does not use alcohol  Totally disabled since   Past driving restrictions, no freeway driving, no rush hour driving last evaluated 2017  No longer drives      Family history  Father with lung cancer and MI  at age 66  Mother with MI and high blood pressure  at age 73  Brother  in motor vehicle accident  Sister with cervical cancer      Work-up  MRI scan brain 2005 increasing plaque load compared to 1999 no active plaques  MRI brain 2007 moderate white matter changes stable compared to   MRI brain 2014, multiple new chronic lesions compared to previous scan no active lesions seen (compared to )  MRI scan brain 2015  A.  Diffuse volume loss and cerebral white matter  changes consistent with multiple sclerosis  B.  No active lesions seen on contrast scan  Swallow study 8/13/2021  No aspiration    [facility]  8/4/2021, 25/30    Exam    Review of system  Pertinent positives and negatives    No headache no chest pain no shortness of breath no nausea vomiting no diarrhea no fever chills    Does have diplopia  Blurry vision worse on the left than the right chronic  Dysarthric speech  Dysphagia coughs when eating  Clumsy limbs worse on the left and worse in the legs and the arms  Poor gait ataxic uses a walker  Urinary incontinence and retention self caths    Otherwise review systems negative  General exam  Blood pressure 127/74, pulse 68, temperature 97.1  HEENT significant for proptosis  Lungs decreased breath sounds bilaterally history of smoker possible COPD no wheezing  Heart rate regular  Abdomen soft positive bowel sounds  Symmetrical pulses  No edema the feet    Neurologic exam  Alert oriented x3  Prosody of speech dysarthric halting  Normal naming  Normal comprehension  Normal repetition  No aphasia  No neglect  Formal [place] memory testing 25 out of 30    Cranial nerves II through XII significant for  Unreactive left pupil  Ophthalmoplegia with poor eye saccades (also has proptosis)  Blurry vision worse in the left than the eye with monocular testing  No discrete visual field cut noted  Dysarthric speech tongue twisters thick  Face relatively symmetrical though  Dysarthric speech  Unreactive left pupil optic pallor left greater than right  Dysmetria bilaterally        Upper extremities  Dysmetria with finger-nose  No specific drift    Lower extremities  Left iliopsoas slightly weaker than the right  Increased tone in the left lower extremity compared to the right but present bilaterally    Clumsy dysmetria bilateral lower extremities worse on the left than the right    Reflexes  Brisk in the upper extremities  Brisk at the knees with a little bit of overflow  Absent at the  ankles  Toes equivocal  Negative Azevedo reflex    Gait  Needs both hands to stand up  With the four-wheel walker with hand brake has a spastic ataxic gait  Drags her left leg more so than the right  Turns are slow            Assessment/plan    1.  Secondary progressive MS onset late 1980s with progressive MRI scan changes as above      2.  History of left eye herpes infection with visual loss/hyperthyroidism with proptosis    3.  Dysarthria/visual changes/gait difficulties paresthesias/bladder difficulties/mood difficulties/memory difficulties, as part of her MS and medical issues    4.  Urinary dysfunction from MS follows with urology self caths every 4 to every 3 hours dexterity in hands enough to do the self cath still    5.  Decreased breath sounds history of continuing to smoke COPD discussed with patient cutting down or quitting smoking    Diagnosis  Secondary progressive MS  No longer on immune modulating medications  Originally diagnosed in the late 1980s    Plan  Formal physical therapy eval to see if we can maximize gait safety  Formal video swallow with speech path to see if we can adjust diet to avoid pneumonias  Continue to follow with urology with instructions for self cathing every 3-every 4 hours  Current medications filled by primary MD     Neurontin 100 mg tab, 2 tabs nightly     Imipramine 50 mg tab, 1 tab nightly     Oxybutynin ER 15 mg tab, 2 tabs nightly     Trazodone 50 mg nightly    See formal med list for other meds    Reviewed gait instability  Wrote prescription for lightweight collapsible portable self-propelled wheelchair to use outside the home for transport to medical visits  This is due to significant risk for falls with her ataxia from her secondary progressive MS    Discussed and reviewed the above  Follow-up in 6 months    Total care time today 32 minutes

## 2021-12-08 NOTE — NURSING NOTE
"Tonya Rordiguez is a 66 year old female who presents for:  Chief Complaint   Patient presents with     Multiple Sclerosis        Initial Vitals:  /74   Pulse 68   Wt 70.4 kg (155 lb 3.2 oz)   SpO2 97%   BMI 22.92 kg/m   Estimated body mass index is 22.92 kg/m  as calculated from the following:    Height as of 8/4/21: 1.753 m (5' 9\").    Weight as of this encounter: 70.4 kg (155 lb 3.2 oz).. Body surface area is 1.85 meters squared. BP completed using cuff size: regular  Data Unavailable    Won Goodman MA    "

## 2021-12-08 NOTE — LETTER
December 8, 2021      Tonya RICHARDSON Michael  [address]  [facility] [address]    This is a prescription for durable medical equipment:    Diagnosis:  Multiple Sclerosis ( G 35 ) secondary progressive  Increased risk for falls with severe gait ataxia    Equipment:  Light weight wheelchair self-propelled collapsible    To be used for transport to medical visits outside the home.    Sincerely,    Dr. Devendra Tabares

## 2021-12-12 ENCOUNTER — MEDICAL CORRESPONDENCE (OUTPATIENT)
Dept: HEALTH INFORMATION MANAGEMENT | Facility: CLINIC | Age: 66
End: 2021-12-12
Payer: COMMERCIAL

## 2021-12-24 ENCOUNTER — OFFICE VISIT (OUTPATIENT)
Dept: FAMILY MEDICINE | Facility: CLINIC | Age: 66
End: 2021-12-24
Payer: COMMERCIAL

## 2021-12-24 VITALS
HEART RATE: 74 BPM | OXYGEN SATURATION: 98 % | RESPIRATION RATE: 16 BRPM | TEMPERATURE: 97.9 F | SYSTOLIC BLOOD PRESSURE: 128 MMHG | BODY MASS INDEX: 22.66 KG/M2 | WEIGHT: 153 LBS | DIASTOLIC BLOOD PRESSURE: 80 MMHG | HEIGHT: 69 IN

## 2021-12-24 DIAGNOSIS — G25.81 RESTLESS LEG: ICD-10-CM

## 2021-12-24 DIAGNOSIS — E05.90 HYPERTHYROIDISM: ICD-10-CM

## 2021-12-24 DIAGNOSIS — R00.0 TACHYCARDIA: ICD-10-CM

## 2021-12-24 DIAGNOSIS — E05.00 GRAVES' DISEASE: ICD-10-CM

## 2021-12-24 DIAGNOSIS — E05.00 GRAVES DISEASE: ICD-10-CM

## 2021-12-24 DIAGNOSIS — H17.9 LEFT CORNEAL SCAR: ICD-10-CM

## 2021-12-24 DIAGNOSIS — E78.2 MIXED HYPERLIPIDEMIA: ICD-10-CM

## 2021-12-24 DIAGNOSIS — R32 URINARY INCONTINENCE, UNSPECIFIED TYPE: ICD-10-CM

## 2021-12-24 DIAGNOSIS — G47.00 INSOMNIA, UNSPECIFIED TYPE: ICD-10-CM

## 2021-12-24 DIAGNOSIS — G35 MULTIPLE SCLEROSIS (H): Primary | ICD-10-CM

## 2021-12-24 DIAGNOSIS — Z12.31 ENCOUNTER FOR SCREENING MAMMOGRAM FOR BREAST CANCER: ICD-10-CM

## 2021-12-24 DIAGNOSIS — F32.5 MAJOR DEPRESSION IN COMPLETE REMISSION (H): ICD-10-CM

## 2021-12-24 DIAGNOSIS — Z12.11 SCREEN FOR COLON CANCER: ICD-10-CM

## 2021-12-24 DIAGNOSIS — Z13.1 SCREENING FOR DIABETES MELLITUS: ICD-10-CM

## 2021-12-24 LAB
ALT SERPL W P-5'-P-CCNC: 14 U/L (ref 0–50)
CHOLEST SERPL-MCNC: 231 MG/DL
FASTING STATUS PATIENT QL REPORTED: YES
FASTING STATUS PATIENT QL REPORTED: YES
GLUCOSE BLD-MCNC: 96 MG/DL (ref 70–99)
HDLC SERPL-MCNC: 57 MG/DL
LDLC SERPL CALC-MCNC: 155 MG/DL
NONHDLC SERPL-MCNC: 174 MG/DL
T3FREE SERPL-MCNC: 3.2 PG/ML (ref 2.3–4.2)
T4 FREE SERPL-MCNC: 1.06 NG/DL (ref 0.76–1.46)
TRIGL SERPL-MCNC: 94 MG/DL
TSH SERPL DL<=0.005 MIU/L-ACNC: 0.48 MU/L (ref 0.4–4)

## 2021-12-24 PROCEDURE — 84460 ALANINE AMINO (ALT) (SGPT): CPT | Performed by: FAMILY MEDICINE

## 2021-12-24 PROCEDURE — 84443 ASSAY THYROID STIM HORMONE: CPT | Performed by: FAMILY MEDICINE

## 2021-12-24 PROCEDURE — 84439 ASSAY OF FREE THYROXINE: CPT | Performed by: FAMILY MEDICINE

## 2021-12-24 PROCEDURE — 36415 COLL VENOUS BLD VENIPUNCTURE: CPT | Performed by: FAMILY MEDICINE

## 2021-12-24 PROCEDURE — 82947 ASSAY GLUCOSE BLOOD QUANT: CPT | Performed by: FAMILY MEDICINE

## 2021-12-24 PROCEDURE — 84481 FREE ASSAY (FT-3): CPT | Performed by: FAMILY MEDICINE

## 2021-12-24 PROCEDURE — 80061 LIPID PANEL: CPT | Performed by: FAMILY MEDICINE

## 2021-12-24 PROCEDURE — 99214 OFFICE O/P EST MOD 30 MIN: CPT | Performed by: FAMILY MEDICINE

## 2021-12-24 RX ORDER — VALACYCLOVIR HYDROCHLORIDE 500 MG/1
500 TABLET, FILM COATED ORAL DAILY
Qty: 30 TABLET | Refills: 0 | Status: SHIPPED | OUTPATIENT
Start: 2021-12-24 | End: 2024-09-11

## 2021-12-24 RX ORDER — OXYBUTYNIN CHLORIDE 15 MG/1
30 TABLET, EXTENDED RELEASE ORAL AT BEDTIME
Qty: 180 TABLET | Refills: 3 | Status: SHIPPED | OUTPATIENT
Start: 2021-12-24 | End: 2022-06-08

## 2021-12-24 RX ORDER — GABAPENTIN 100 MG/1
200 CAPSULE ORAL AT BEDTIME
Qty: 180 CAPSULE | Refills: 3 | Status: SHIPPED | OUTPATIENT
Start: 2021-12-24 | End: 2022-06-08

## 2021-12-24 RX ORDER — IMIPRAMINE HCL 50 MG
TABLET ORAL
Qty: 30 TABLET | Refills: 0 | Status: SHIPPED | OUTPATIENT
Start: 2021-12-24 | End: 2022-01-31

## 2021-12-24 RX ORDER — TRAZODONE HYDROCHLORIDE 50 MG/1
50 TABLET, FILM COATED ORAL
Qty: 90 TABLET | Refills: 3 | Status: SHIPPED | OUTPATIENT
Start: 2021-12-24 | End: 2022-06-08

## 2021-12-24 RX ORDER — OXYBUTYNIN CHLORIDE 15 MG/1
30 TABLET, EXTENDED RELEASE ORAL AT BEDTIME
Qty: 60 TABLET | Refills: 0 | Status: SHIPPED | OUTPATIENT
Start: 2021-12-24 | End: 2021-12-24

## 2021-12-24 RX ORDER — SIMVASTATIN 20 MG
20 TABLET ORAL AT BEDTIME
Qty: 30 TABLET | Refills: 0 | Status: SHIPPED | OUTPATIENT
Start: 2021-12-24 | End: 2021-12-24

## 2021-12-24 RX ORDER — TRAZODONE HYDROCHLORIDE 50 MG/1
50 TABLET, FILM COATED ORAL
Qty: 30 TABLET | Refills: 0 | Status: SHIPPED | OUTPATIENT
Start: 2021-12-24 | End: 2021-12-24

## 2021-12-24 RX ORDER — METOPROLOL SUCCINATE 25 MG/1
25 TABLET, EXTENDED RELEASE ORAL DAILY
Qty: 90 TABLET | Refills: 3 | Status: SHIPPED | OUTPATIENT
Start: 2021-12-24 | End: 2022-03-07

## 2021-12-24 RX ORDER — GABAPENTIN 100 MG/1
200 CAPSULE ORAL AT BEDTIME
Qty: 60 CAPSULE | Refills: 0 | Status: SHIPPED | OUTPATIENT
Start: 2021-12-24 | End: 2021-12-24

## 2021-12-24 RX ORDER — METOPROLOL SUCCINATE 25 MG/1
25 TABLET, EXTENDED RELEASE ORAL DAILY
Qty: 30 TABLET | Refills: 0 | Status: SHIPPED | OUTPATIENT
Start: 2021-12-24 | End: 2021-12-24

## 2021-12-24 RX ORDER — SIMVASTATIN 20 MG
20 TABLET ORAL AT BEDTIME
Qty: 90 TABLET | Refills: 3 | Status: SHIPPED | OUTPATIENT
Start: 2021-12-24 | End: 2022-11-01

## 2021-12-24 ASSESSMENT — PATIENT HEALTH QUESTIONNAIRE - PHQ9: SUM OF ALL RESPONSES TO PHQ QUESTIONS 1-9: 2

## 2021-12-24 ASSESSMENT — MIFFLIN-ST. JEOR: SCORE: 1298.38

## 2021-12-24 ASSESSMENT — PAIN SCALES - GENERAL: PAINLEVEL: NO PAIN (0)

## 2021-12-24 NOTE — PROGRESS NOTES
Assessment & Plan     Multiple sclerosis (H)  She does see her neurologist.  MS has been stable other than some numbness doing down her legs  - imipramine (TOFRANIL) 50 MG tablet; TAKE 1 TABLET BY MOUTH AT BEDTIME GENERIC EQUIVALENT FOR TOFRANIL    Major depression in complete remission (H)  Reviewed PHQ9    Screen for colon cancer      Restless leg  Refilled med  - gabapentin (NEURONTIN) 100 MG capsule; Take 2 capsules (200 mg) by mouth At Bedtime    Urinary incontinence, unspecified type  Refilled med  - imipramine (TOFRANIL) 50 MG tablet; TAKE 1 TABLET BY MOUTH AT BEDTIME GENERIC EQUIVALENT FOR TOFRANIL  - oxybutynin ER (DITROPAN XL) 15 MG 24 hr tablet; Take 2 tablets (30 mg) by mouth At Bedtime    Tachycardia  stable  - metoprolol succinate ER (TOPROL-XL) 25 MG 24 hr tablet; Take 1 tablet (25 mg) by mouth daily    Hyperthyroidism  Continue with med  - metoprolol succinate ER (TOPROL-XL) 25 MG 24 hr tablet; Take 1 tablet (25 mg) by mouth daily    Mixed hyperlipidemia  Check lab  - simvastatin (ZOCOR) 20 MG tablet; Take 1 tablet (20 mg) by mouth At Bedtime  - Lipid panel reflex to direct LDL Fasting; Future  - ALT; Future    Insomnia, unspecified type  Refilled med  - traZODone (DESYREL) 50 MG tablet; Take 1 tablet (50 mg) by mouth nightly as needed for sleep    Left corneal scar  Refilled med and reminder her to see her eye doctor  - valACYclovir (VALTREX) 500 MG tablet; Take 1 tablet (500 mg) by mouth daily Will prescribe only for 2 months to last you until you have an appointment with our [facility] doctors. Please schedule an appointment with your eye doctor at [phone].    Graves disease  Needs to see endocrine and released labs  - Adult Endocrinology Referral; Future    Encounter for screening mammogram for breast cancer    - MA Screen Bilateral w/Joe; Future    Screening for diabetes mellitus    - Glucose; Future             Tobacco Cessation:   reports that she has been smoking cigarettes. She has a  "45.00 pack-year smoking history. She has never used smokeless tobacco.  Tobacco Cessation Action Plan: Information offered: Patient not interested at this time    See Patient Instructions    Return in about 4 weeks (around 1/21/2022) for If not better.    Robb Cabrera MD  [facility]    Heber Castaneda is a 66 year old who presents for the following health issues     HPI     Hyperlipidemia Follow-Up      Are you regularly taking any medication or supplement to lower your cholesterol?   Yes- simvastatin    Are you having muscle aches or other side effects that you think could be caused by your cholesterol lowering medication?  No    Hypothyroidism Follow-up      Since last visit, patient describes the following symptoms: Weight stable, no hair loss, no skin changes, no constipation, no loose stools      How many servings of fruits and vegetables do you eat daily?  0-1    On average, how many sweetened beverages do you drink each day (Examples: soda, juice, sweet tea, etc.  Do NOT count diet or artificially sweetened beverages)?   1    How many days per week do you exercise enough to make your heart beat faster? 3 or less    How many minutes a day do you exercise enough to make your heart beat faster? 9 or less    How many days per week do you miss taking your medication? 0        Review of Systems         Objective    /80 (BP Location: Right arm, Cuff Size: Adult Regular)   Pulse 74   Temp 97.9  F (36.6  C) (Tympanic)   Resp 16   Ht 1.753 m (5' 9\")   Wt 69.4 kg (153 lb)   SpO2 98%   BMI 22.59 kg/m    Body mass index is 22.59 kg/m .  Physical Exam   GENERAL APPEARANCE: alert, no distress, cooperative and Nourishment slim   RESP: lungs clear to auscultation - no rales, rhonchi or wheezes  CV: regular rates and rhythm, normal S1 S2, no S3 or S4 and no murmur, click or rub  MS: extremities normal- no gross deformities noted  SKIN: no suspicious lesions or rashes  NEURO: Normal " strength and tone, mentation intact, speech normal and uses a rolling walker.  Appears frail  PSYCH: mentation appears normal and affect normal/bright

## 2021-12-24 NOTE — PATIENT INSTRUCTIONS
Please go to lab.    Please follow up with Dr. Figueroa the endocrinologist.    I have refilled your medications that I take care of at this time.    Please get your mammogram done this spring call [phone] to set up.          Thank you for choosing [facility].  You may be receiving an email and/or telephone survey request from [facility] Customer Experience regarding your visit today.  Please take a few minutes to respond to the survey to let us know how we are doing.      If you have questions or concerns, please contact us via [employer] or you can contact your care team at [phone] option 2.    Our Clinic hours are:  Monday - Thursday 7am-6pm  Friday 7am-5pm    The [place] outpatient lab hours are:  Monday - Friday 7am-4:30pm    Appointments are required, call [phone]    If you have clinical questions after hours or would like to schedule an appointment,  call the clinic at [phone].

## 2021-12-24 NOTE — LETTER
My Migraine Action Plan      Date: 12/24/2021     My Name: Tonya Rodriguez   YOB: 1955  My Pharmacy:    [facility] - [place], [place]  [email or web address]  [facility] ([facility]IL ORDER) [facility] [place] - [address]  [facility] [place] [address] [facility] [id number] [place] [address]  [facility] ([facility]) [facility] - [place], [place] - [address] [facility] - [facility] [address] [facility]     My Preventive Medicine(s):    My Rescue Medicine(s):   My Doctor: Robb Cabrera     My Clinic: [address] [address] [phone]        GREEN ZONE = Good Control    My headache plan is working.   I can do what I need to do.         I WILL:     ? Keep managing my triggers.  ? Write in my migraine diary each time I have a headache.  ? Keep taking my preventive medicine daily.  ? Take my rescue medicine as needed.             YELLOW ZONE = Not Enough Control    My headache plan isn t always working.   My headaches keep me from doing   some of the things I need to do.   I WILL:     ? Set goals to control my triggers and act on them.  ? Write in my migraine diary each time I have a headache and review it for                     patterns or new triggers.  ? Keep taking my preventive medicine daily.  ? Take my rescue medicine as needed.  l Make sure I stay hydrated.  ? Call my provider or clinic if my rescue medication did not help after taking it on             at least two separate headache days  ? Call my provider or clinic if I need to use my rescue medicine more than an                  average of 2 times per week over the course of one month.               RED ZONE = Poor or No Control    My headache plan has  failed. I can t do anything  when I have one.  My  medicines aren t working.   I WILL:   ? Keep taking my preventive medicine daily.  ? Make sure I stay hydrated.  ? Call my provider or clinic if my medicines are not helping or if I am not able to              keep fluids down because of nausea.  ? Let my provider or clinic know within 2 weeks if I have gone to an urgent care or          emergency department.          Provider specific instructions:      Do not take over the counter pain relievers more than 14 days per month. This includes NSAIDS (Ibuprofen, Motrin, Advil, Naproxen, Aleve, etc), acetaminophen (Tylenol), aspirin, and Excedrin.     If you use a triptan medicine (sumatriptan, rizatriptan, frovatriptan, zolmitriptan, eletriptan etc) take it as soon as you notice the migraine starting. You can take a second dose 2 hours after the first dose if you still have any migraine symptoms.    It is fine to take 600 mg of ibuprofen (Advil) or 440 mg of naproxen (Aleve) with the triptan medicine.  Try not to use triptan medicines more than 2 days a week.    If you use your rescue medicine more than 2 times per week over the course of 1 month, set up an appointment with your provider to talk about starting a medication to prevent migraines.               Other Things I Can Do to Help My Migraines   Track Migraines and Triggers     Keep a headache journal of your symptoms. Try to track how often you have a headache, how long it lasts and what medicines you used. You can also track severity of headache.    You can use a smart phone curt: My Migraine Matteo. The information that you track in the curt can be uploaded for your provider through [employer].     You can also track your migraines on paper. The clinic can print a copy of the Migraine Diary for you. Link: [email or web address]      Lifestyle Changes 1. Try to drink enough water each day (48-70 fluid ounces a day)  2. Limit caffeine intact-one caffeinated beverage a day  3. Eat regular meals  4. Try to  get cardiovascular exercise (walking, swimming, biking) 4-5 times a week  5. Try to have a routine sleep schedule going to bed at the same time each night and getting up the same time each morning with enough time to sleep in between  6. Sometimes foods can trigger migraines you can try to eliminate them if you think they make symptoms worse: dairy, gluten, nuts (cashews, almonds), artificial sweeteners, artificial colors, high sugar content foods, certain alcohol, chocolate, and preservatives like MSG and nitrates.      Other Therapies  Talk to your doctor about adding other therapies to your headache treatment plan including:   - Cognitive behavioral therapy  - Biofeedback  - Practice therapeutic techniques at home such as Progressive Relaxation and Deep Breathing    Research suggests that combining one or more of these therapies with medication can be helpful to reduce the number and severity of migraines.     Learn More To learn more about headaches you can go to the following websites:  https://americanmigrainefoundation.org/   http://www.headaches.org/

## 2021-12-26 ENCOUNTER — HEALTH MAINTENANCE LETTER (OUTPATIENT)
Age: 66
End: 2021-12-26

## 2022-01-06 ENCOUNTER — HOSPITAL ENCOUNTER (OUTPATIENT)
Dept: MAMMOGRAPHY | Facility: CLINIC | Age: 67
Discharge: HOME | End: 2022-01-06
Attending: FAMILY MEDICINE | Admitting: FAMILY MEDICINE
Payer: COMMERCIAL

## 2022-01-06 DIAGNOSIS — Z12.31 ENCOUNTER FOR SCREENING MAMMOGRAM FOR BREAST CANCER: ICD-10-CM

## 2022-01-06 PROCEDURE — 77067 SCR MAMMO BI INCL CAD: CPT

## 2022-01-23 DIAGNOSIS — E05.90 HYPERTHYROIDISM: ICD-10-CM

## 2022-01-24 DIAGNOSIS — H17.9 LEFT CORNEAL SCAR: ICD-10-CM

## 2022-01-24 DIAGNOSIS — R32 URINARY INCONTINENCE, UNSPECIFIED TYPE: ICD-10-CM

## 2022-01-24 DIAGNOSIS — G35 MULTIPLE SCLEROSIS (H): ICD-10-CM

## 2022-01-24 RX ORDER — IMIPRAMINE HCL 50 MG
TABLET ORAL
Qty: 30 TABLET | Refills: 0 | Status: CANCELLED | OUTPATIENT
Start: 2022-01-24

## 2022-01-24 RX ORDER — VALACYCLOVIR HYDROCHLORIDE 500 MG/1
500 TABLET, FILM COATED ORAL DAILY
Qty: 30 TABLET | Refills: 0 | Status: CANCELLED | OUTPATIENT
Start: 2022-01-24

## 2022-01-24 NOTE — TELEPHONE ENCOUNTER
"Requested Prescriptions   Pending Prescriptions Disp Refills     valACYclovir (VALTREX) 500 MG tablet 30 tablet 0     Sig: Take 1 tablet (500 mg) by mouth daily Will prescribe only for 2 months to last you until you have an appointment with our [facility] doctors. Please schedule an appointment with your eye doctor at [phone].       Antivirals for Herpes Protocol Failed - 1/24/2022  1:29 PM        Failed - Recent (12 mo) or future (30 days) visit within the authorizing provider's specialty     Patient has had an office visit with the authorizing provider or a provider within the authorizing providers department within the previous 12 mos or has a future within next 30 days. See \"Patient Info\" tab in inbasket, or \"Choose Columns\" in Meds & Orders section of the refill encounter.              Failed - Normal serum creatinine on file in past 12 months     Recent Labs   Lab Test 09/24/19  1531   CR 0.76       Ok to refill medication if creatinine is low          Passed - Patient is age 12 or older        Passed - Medication is active on med list           imipramine (TOFRANIL) 50 MG tablet 30 tablet 0     Sig: TAKE 1 TABLET BY MOUTH AT BEDTIME GENERIC EQUIVALENT FOR TOFRANIL       Tricyclic Antidepressants Protocol Failed - 1/24/2022  1:29 PM        Failed - Recent (12 mo) or future (30 days) visit within the authorizing provider's specialty      Patient has had an office visit with the authorizing provider or a provider within the authorizing providers department within the previous 12 mos or has a future within next 30 days. See \"Patient Info\" tab in inbasket, or \"Choose Columns\" in Meds & Orders section of the refill encounter.              Passed - Blood pressure under 140/90 in past 12 months     BP Readings from Last 3 Encounters:   12/24/21 128/80   12/08/21 127/74   08/04/21 (!) 142/75                 Passed - Medication is active on med list        Passed - Patient is age 18 or older        Passed - No active " pregnancy on record        Passed - No positive pregnancy test in past 12 months

## 2022-01-24 NOTE — TELEPHONE ENCOUNTER
She was getting the imipramine from her neurologist.  She was getting the valacyclovir from her ophthalmologist.  Please have her contact her specialists.  Robb Cabrera MD  Family Medicine

## 2022-01-25 ENCOUNTER — TRANSFERRED RECORDS (OUTPATIENT)
Dept: HEALTH INFORMATION MANAGEMENT | Facility: CLINIC | Age: 67
End: 2022-01-25
Payer: COMMERCIAL

## 2022-01-25 RX ORDER — METHIMAZOLE 10 MG/1
TABLET ORAL
Qty: 225 TABLET | Refills: 0 | Status: SHIPPED | OUTPATIENT
Start: 2022-01-25 | End: 2022-01-31

## 2022-01-25 NOTE — TELEPHONE ENCOUNTER
Next office visit scheduled for 1/31/22 with Dr. Figueroa.   LOV 2019: Hyperthyroidism - +TSI antibody consistent with Grave's: favor methimazole for now    To provider for consideration    Taya ANTHONY    Specialty Clinics - Flex RN

## 2022-01-25 NOTE — TELEPHONE ENCOUNTER
[facility] Call Center    Phone Message    May a detailed message be left on voicemail: yes     Reason for Call: Medication Refill Request    Has the patient contacted the pharmacy for the refill? Yes   Name of medication being requested: methimazole (TAPAZOLE) 10 MG tablet  Provider who prescribed the medication: Carolina  Pharmacy: [facility] SERVICE) [facility] [place] - [address]  Date medication is needed: ASAOLAMIDE     April from pharmacy called in stating pt needs a refill at this time and is out of medication.       Action Taken: Other: Endo    Travel Screening: Not Applicable

## 2022-01-27 ENCOUNTER — HOSPITAL ENCOUNTER (OUTPATIENT)
Dept: MAMMOGRAPHY | Facility: CLINIC | Age: 67
End: 2022-01-27
Attending: FAMILY MEDICINE
Payer: COMMERCIAL

## 2022-01-27 ENCOUNTER — HOSPITAL ENCOUNTER (OUTPATIENT)
Dept: ULTRASOUND IMAGING | Facility: CLINIC | Age: 67
End: 2022-01-27
Attending: FAMILY MEDICINE
Payer: COMMERCIAL

## 2022-01-27 DIAGNOSIS — R92.8 ABNORMAL MAMMOGRAM: ICD-10-CM

## 2022-01-27 PROCEDURE — 76642 ULTRASOUND BREAST LIMITED: CPT | Mod: RT

## 2022-01-27 PROCEDURE — 77061 BREAST TOMOSYNTHESIS UNI: CPT

## 2022-01-28 DIAGNOSIS — R32 URINARY INCONTINENCE, UNSPECIFIED TYPE: ICD-10-CM

## 2022-01-28 DIAGNOSIS — G35 MULTIPLE SCLEROSIS (H): ICD-10-CM

## 2022-01-28 NOTE — TELEPHONE ENCOUNTER
Pending Prescriptions:                       Disp   Refills    imipramine (TOFRANIL) 50 MG tablet         30 tab*0        Sig: TAKE 1 TABLET BY MOUTH AT BEDTIME GENERIC EQUIVALENT           FOR TOFRANIL        Routing refill request to provider for review/approval because:  This passes RN protocol, but appears only a month supply given at last visit on 12/24/21 with a note that patient sees neurology.  Would you like to continue prescribing, or defer to specialty?          Ana Barrios RN

## 2022-01-31 ENCOUNTER — OFFICE VISIT (OUTPATIENT)
Dept: ENDOCRINOLOGY | Facility: CLINIC | Age: 67
End: 2022-01-31
Attending: FAMILY MEDICINE
Payer: COMMERCIAL

## 2022-01-31 VITALS
SYSTOLIC BLOOD PRESSURE: 102 MMHG | WEIGHT: 152 LBS | BODY MASS INDEX: 22.51 KG/M2 | TEMPERATURE: 96.5 F | DIASTOLIC BLOOD PRESSURE: 73 MMHG | HEART RATE: 82 BPM | HEIGHT: 69 IN

## 2022-01-31 DIAGNOSIS — E05.00 GRAVES DISEASE: ICD-10-CM

## 2022-01-31 DIAGNOSIS — E05.90 HYPERTHYROIDISM: ICD-10-CM

## 2022-01-31 PROCEDURE — 99214 OFFICE O/P EST MOD 30 MIN: CPT | Performed by: INTERNAL MEDICINE

## 2022-01-31 RX ORDER — METHIMAZOLE 10 MG/1
TABLET ORAL
Qty: 225 TABLET | Refills: 3 | Status: SHIPPED | OUTPATIENT
Start: 2022-01-31 | End: 2022-03-07

## 2022-01-31 RX ORDER — IMIPRAMINE HCL 50 MG
TABLET ORAL
Qty: 90 TABLET | Refills: 3 | Status: SHIPPED | OUTPATIENT
Start: 2022-01-31 | End: 2022-03-09

## 2022-01-31 ASSESSMENT — MIFFLIN-ST. JEOR: SCORE: 1293.85

## 2022-01-31 NOTE — PATIENT INSTRUCTIONS
-Continue methimazole at current dose.     -I would recommend you discuss Teppeza with your eye doctor. We can have infusions done at the [facility] if needed.     -Labs in 6 months.   -Recommend future care with Dr Jimenez in [place] or Dr Isaac at the [facility].     -Strongly urge you to quit smoking.

## 2022-01-31 NOTE — PROGRESS NOTES
"S: Pt being seen in f/u for hyperthyroidism.  Previously seen by Dr Morton.   \"1990's. Diagnosed with multiple sclerosis (MS), clinicians wondered if present 10+yrs previous  Has lived in [facility]  Symptoms decreased vision, leg weakness and falls, speech symptoms, also incomplete bladder emptying  Neurology evaluations with Dr. MERVIN Mukherjee     Previous eye problems  ~2013. Acute problem with left eye redness  Medical evaluation at [facility], subsequent evaluation at [facility]   Saw Dr. Bray/ophthalmology  Patient recalls comment about \"herpes\" infection of the eye, treatment with eye drops left eye, also Valtrex pills  Subsequent issues with eye swelling, has seen Dr. Kush Gutierrez/[facility] Op[facility]mology dept  Treatment with eye drops, also \"cold pack\" vs \"hot pack\" to both eyes 3-4x/day  9/4/18. Eye evaluation with Dr. Gutierrez, diagnoses Blepharitis, H/O Herpes keratitis, left eye:, Cornea scar, left eye, Dry eye, both eyes      Progressive symptoms included weight loss ~35#, insomnia, decreased appetite              Also tremors, faster heart rate, muscle weakness legs/arms, fatigue  10/5/18. Med evaluation with Rosalba Landa CNP/[facility]              Diagnosis of hyperthyroidism              Began hyperthyroidism treatment with methimazole and metoprolol medication\"     She is currently taking 25 mg of methimazole daily and 25 mg of metoprolol daily.      She is eating \"all the time\" as she is worried about losing weight. Up 4 pounds since last visit.      Vision is blurry. She is using lubricating drops at night. No eye pain. Feels similar to when she saw ophthalmology this summer. She smokes 3/4 PPD.      Chronic tremors which are unchanged.   Chronic constipation.      In 1/2022, continues methimazole 25 mg every day.   She has been having trouble falling asleep recently. Denies problems with racing thoughts.   She has been seeing Dr Chamberlain at [facility].   He referred her to a specialist " "in [place].   She continues to smoke 3/4 PPD.     ROS: 10 point ROS neg other than the symptoms noted above in the HPI.     O:  Vital signs:  Temp: (!) 96.5  F (35.8  C) Temp src: Tympanic BP: 102/73 Pulse: 82           Height: 175.3 cm (5' 9\") Weight: 68.9 kg (152 lb)  Estimated body mass index is 22.45 kg/m  as calculated from the following:    Height as of this encounter: 1.753 m (5' 9\").    Weight as of this encounter: 68.9 kg (152 lb).  Gen: In NAD.   HEENT: +proptosis R>L, no lid lag, EOMI, thyroid palpable w/o clear nodule.   Card: S1 S2 RRR no m/r/g.  Pulm: CTA b/l.    Ext: no LE edema.   MSK: no gross deformities.  Neuro: + tremor, +2 DTR's     A/P:   Hyperthyroidism - +TSI antibody consistent with Grave's. Options of methimazole, I 131, and surgery reviewed. Given her BRIDGET and smoking, would favor methimazole for now. She was a little hyperthyroid still In 5/2019 when labs last checked. She has BRIDGET and smokes. We reviewed the risk of worsening BRIDGET.   In 1/2022, thyroid labs remain controlled (checked on 12/24/21). Discussed tepezza. Long discussion of smoking cessation.   -Continue methimazole at current dose.   -I would recommend you discuss Teppeza with your eye doctor. We can have infusions done at the [facility] if needed.   -Labs in 6 months.   -Recommend future care with Dr Jimenez in [place] or Dr Isaac at the [facility].   -Strongly urge you to quit smoking.      Thyroid eye disease -as above.     Diego Figueroa MD on 2/1/2022 at 7:32 AM    "

## 2022-01-31 NOTE — LETTER
"    1/31/2022         RE: Tonya Rodriguez  [email or web address]  [facility] [address]        Dear Colleague,    Thank you for referring your patient, Tonya Rodriguez, to the [facility] ENDOCRINOLOGY. Please see a copy of my visit note below.    S: Pt being seen in f/u for hyperthyroidism.  Previously seen by Dr Morton.   \"1990's. Diagnosed with multiple sclerosis (MS), clinicians wondered if present 10+yrs previous  Has lived in [facility]  Symptoms decreased vision, leg weakness and falls, speech symptoms, also incomplete bladder emptying  Neurology evaluations with Dr. MERVIN Mukherjee     Previous eye problems  ~2013. Acute problem with left eye redness  Medical evaluation at [facility], subsequent evaluation at [facility]   Saw Dr. Bray/ophthalmology  Patient recalls comment about \"herpes\" infection of the eye, treatment with eye drops left eye, also Valtrex pills  Subsequent issues with eye swelling, has seen Dr. Kush Gutierrez/[facility] Ophthalmology dept  Treatment with eye drops, also \"cold pack\" vs \"hot pack\" to both eyes 3-4x/day  9/4/18. Eye evaluation with Dr. Gutierrez, diagnoses Blepharitis, H/O Herpes keratitis, left eye:, Cornea scar, left eye, Dry eye, both eyes      Progressive symptoms included weight loss ~35#, insomnia, decreased appetite              Also tremors, faster heart rate, muscle weakness legs/arms, fatigue  10/5/18. Med evaluation with Rosalba Landa CNP/[facility]              Diagnosis of hyperthyroidism              Began hyperthyroidism treatment with methimazole and metoprolol medication\"     She is currently taking 25 mg of methimazole daily and 25 mg of metoprolol daily.      She is eating \"all the time\" as she is worried about losing weight. Up 4 pounds since last visit.      Vision is blurry. She is using lubricating drops at night. No eye pain. Feels similar to when she saw ophthalmology this summer. She smokes 3/4 PPD.      Chronic tremors which are " "unchanged.   Chronic constipation.      In 1/2022, continues methimazole 25 mg every day.   She has been having trouble falling asleep recently. Denies problems with racing thoughts.   She has been seeing Dr Chamberlain at [facility].   He referred her to a specialist in [place].   She continues to smoke 3/4 PPD.     ROS: 10 point ROS neg other than the symptoms noted above in the HPI.     O:  Vital signs:  Temp: (!) 96.5  F (35.8  C) Temp src: Tympanic BP: 102/73 Pulse: 82           Height: 175.3 cm (5' 9\") Weight: 68.9 kg (152 lb)  Estimated body mass index is 22.45 kg/m  as calculated from the following:    Height as of this encounter: 1.753 m (5' 9\").    Weight as of this encounter: 68.9 kg (152 lb).  Gen: In NAD.   HEENT: +proptosis R>L, no lid lag, EOMI, thyroid palpable w/o clear nodule.   Card: S1 S2 RRR no m/r/g.  Pulm: CTA b/l.    Ext: no LE edema.   MSK: no gross deformities.  Neuro: + tremor, +2 DTR's     A/P:   Hyperthyroidism - +TSI antibody consistent with Grave's. Options of methimazole, I 131, and surgery reviewed. Given her BRIDGET and smoking, would favor methimazole for now. She was a little hyperthyroid still In 5/2019 when labs last checked. She has BRIDGET and smokes. We reviewed the risk of worsening BRIDGET.   In 1/2022, thyroid labs remain controlled (checked on 12/24/21). Discussed tepezza. Long discussion of smoking cessation.   -Continue methimazole at current dose.   -I would recommend you discuss Teppeza with your eye doctor. We can have infusions done at the [facility] if needed.   -Labs in 6 months.   -Recommend future care with Dr Jimenez in [place] or Dr Isaac at the [facility].   -Strongly urge you to quit smoking.      Thyroid eye disease -as above.     Diego Figueroa MD on 2/1/2022 at 7:32 AM        Again, thank you for allowing me to participate in the care of your patient.        Sincerely,        Diego Figueroa MD    "

## 2022-03-04 DIAGNOSIS — E05.90 HYPERTHYROIDISM: ICD-10-CM

## 2022-03-04 NOTE — CONFIDENTIAL NOTE
Pending Prescriptions:                       Disp   Refills    methimazole (TAPAZOLE) 10 MG tablet [Phar*225 ta*3            Sig: TAKE TWO AND ONE-HALF TABLETS BY MOUTH DAILY AS           DIRECTED. GENERIC EQUIVALENT FOR TAPAZOLE. MUST           COMPLETE FOLLOW UP BEFORE FURTHER REFILLS.    Routing refill request to provider for review/approval because:  Needs provider approval  Last cbc 9/24/19    Raisa ROMERO RN, [facility] 03/04/22 4:19 PM             no

## 2022-03-07 RX ORDER — METHIMAZOLE 10 MG/1
TABLET ORAL
Qty: 225 TABLET | Refills: 3 | Status: SHIPPED | OUTPATIENT
Start: 2022-03-07 | End: 2023-02-07 | Stop reason: DRUGHIGH

## 2022-03-09 DIAGNOSIS — G35 MULTIPLE SCLEROSIS (H): ICD-10-CM

## 2022-03-09 DIAGNOSIS — R32 URINARY INCONTINENCE, UNSPECIFIED TYPE: ICD-10-CM

## 2022-03-09 RX ORDER — IMIPRAMINE HCL 50 MG
TABLET ORAL
Qty: 90 TABLET | Refills: 3 | Status: SHIPPED | OUTPATIENT
Start: 2022-03-09 | End: 2022-06-08

## 2022-03-22 ENCOUNTER — OFFICE VISIT (OUTPATIENT)
Dept: FAMILY MEDICINE | Facility: CLINIC | Age: 67
End: 2022-03-22
Payer: COMMERCIAL

## 2022-03-22 VITALS
WEIGHT: 154.2 LBS | HEIGHT: 69 IN | DIASTOLIC BLOOD PRESSURE: 82 MMHG | BODY MASS INDEX: 22.84 KG/M2 | OXYGEN SATURATION: 98 % | HEART RATE: 60 BPM | SYSTOLIC BLOOD PRESSURE: 120 MMHG | TEMPERATURE: 97.5 F | RESPIRATION RATE: 18 BRPM

## 2022-03-22 DIAGNOSIS — I99.8 ISCHEMIA OF TOE: ICD-10-CM

## 2022-03-22 DIAGNOSIS — G35 MULTIPLE SCLEROSIS (H): ICD-10-CM

## 2022-03-22 DIAGNOSIS — Z00.00 MEDICARE ANNUAL WELLNESS VISIT, SUBSEQUENT: Primary | ICD-10-CM

## 2022-03-22 DIAGNOSIS — I73.00 RAYNAUD'S SYNDROME WITHOUT GANGRENE: ICD-10-CM

## 2022-03-22 DIAGNOSIS — F32.5 MAJOR DEPRESSION IN COMPLETE REMISSION (H): ICD-10-CM

## 2022-03-22 DIAGNOSIS — L60.0 INGROWN NAIL: ICD-10-CM

## 2022-03-22 PROCEDURE — 99214 OFFICE O/P EST MOD 30 MIN: CPT | Mod: 25 | Performed by: NURSE PRACTITIONER

## 2022-03-22 PROCEDURE — G0438 PPPS, INITIAL VISIT: HCPCS | Performed by: NURSE PRACTITIONER

## 2022-03-22 RX ORDER — MUPIROCIN 20 MG/G
OINTMENT TOPICAL 3 TIMES DAILY
Qty: 30 G | Refills: 0 | Status: SHIPPED | OUTPATIENT
Start: 2022-03-22 | End: 2022-03-22

## 2022-03-22 RX ORDER — MUPIROCIN 20 MG/G
OINTMENT TOPICAL 3 TIMES DAILY
Qty: 30 G | Refills: 0 | Status: SHIPPED | OUTPATIENT
Start: 2022-03-22 | End: 2022-03-29

## 2022-03-22 RX ORDER — NIFEDIPINE 30 MG
30 TABLET, EXTENDED RELEASE ORAL DAILY
Qty: 30 TABLET | Refills: 1 | Status: SHIPPED | OUTPATIENT
Start: 2022-03-22 | End: 2023-05-15

## 2022-03-22 NOTE — PROGRESS NOTES
Assessment & Plan     Medicare annual wellness visit, subsequent      Ischemia of toe  - US WILBUR Doppler with Exercise Bilateral; Future    Raynaud's syndrome without gangrene   Trial:  - US WILBUR Doppler with Exercise Bilateral; Future  - NIFEdipine ER (ADALAT CC) 30 MG 24 hr tablet; Take 1 tablet (30 mg) by mouth daily    Major depression in complete remission (H)  Monitoring, stable on current meds    Multiple sclerosis (H)  Followed by Neurology, stable    Ingrown nail  Discussed home cares  - Orthopedic  Referral; Future  - mupirocin (BACTROBAN) 2 % external ointment; Apply topically 3 times daily for 7 days Updated RX             CONSULTATION/REFERRAL to Podiatry if not improving in 1 week.   FURTHER TESTING: WILBUR ultrasound to assess for PAD    FUTURE APPOINTMENTS:       - Follow-up visit in case of new or worsening symptoms or after WILBUR test.     See Patient Instructions    No follow-ups on file.    PRISCILLA Henry CNP  [facility]    Heber Castaneda is a 66 year old who presents for the following health issues     History of Present Illness       Reason for visit:  Toe nail  Symptoms include:  Pain on toe  Symptom intensity:  Moderate  Had these symptoms before:  No    She eats 2-3 servings of fruits and vegetables daily.She consumes 2 sweetened beverage(s) daily.She exercises with enough effort to increase her heart rate 9 or less minutes per day.  She exercises with enough effort to increase her heart rate 3 or less days per week. She is missing 1 dose(s) of medications per week.       Concern - Toe pain   Onset: 2 weeks   Description: Patient has an ingrown toenail on her 4th toe of left foot   Intensity: moderate   Progression of Symptoms:  improving  Accompanying Signs & Symptoms: painful, discoloration at the tip   Previous history of similar problem: YES   Precipitating factors:        Worsened by: walking on it   Alleviating factors:        Improved by:  "none   Therapies tried and outcome: medicated salve-effective     Concern - Raynaud's  Onset: years    Description:   Purple hands outside, to white and cold, to red and hot    Intensity: moderate    Progression of Symptoms:  worsening    Accompanying Signs & Symptoms:  Pain in toes and calves with walking    Previous history of similar problem:   MS    Precipitating factors:   Worsened by: cold    Alleviating factors:  Improved by: warmth    Therapies Tried and outcome: none       Annual Wellness Visit    Patient has been advised of split billing requirements and indicates understanding: Yes     Are you in the first 12 months of your Medicare Part B coverage?  No    Physical Health:    In general, how would you rate your overall physical health? good    Outside of work, how many days during the week do you exercise?none    Outside of work, approximately how many minutes a day do you exercise?not applicable    If you drink alcohol do you typically have >3 drinks per day or >7 drinks per week? No    Do you usually eat at least 4 servings of fruit and vegetables a day, include whole grains & fiber and avoid regularly eating high fat or \"junk\" foods? NO    Do you have any problems taking medications regularly? No    Do you have any side effects from medications? none    Needs assistance for the following daily activities: transportation, shopping, preparing meals, housework, laundry and money management    Which of the following safety concerns are present in your home?  none identified     Hearing impairment: No    In the past 6 months, have you been bothered by leaking of urine? no    Mental Health:    In general, how would you rate your overall mental or emotional health? good  PHQ-2 Score: (P) 0    Do you feel safe in your environment? Yes    Have you ever done Advance Care Planning? (For example, a Health Directive, POLST, or a discussion with a medical provider or your loved ones about your wishes)? No, advance " "care planning information given to patient to review.  Patient plans to discuss their wishes with loved ones or provider.      Fall risk:  Fallen 2 or more times in the past year?: Yes  Any fall with injury in the past year?: No    Cognitive Screenin) Repeat 3 items (Leader, Season, Table)    2) Clock draw: NORMAL  3) 3 item recall: Recalls 2 objects   Results: NORMAL clock, 1-2 items recalled: COGNITIVE IMPAIRMENT LESS LIKELY    Mini-CogTM Copyright DORA Rubin. Licensed by the author for use in [facility]; reprinted with permission (narda@[facility]). All rights reserved.      Do you have sleep apnea, excessive snoring or daytime drowsiness?: no    Current providers sharing in care for this patient include:   Patient Care Team:  Robb Cabrera MD as PCP - General (Family Practice)  Kush Gutierrez MD as MD (Ophthalmology)  Diego Oscar MD as MD (Ophthalmology)  Robb Cabrera MD as Assigned PCP  Devendra Tabares MD as MD (Neurology)  Devendra Tabares MD as Assigned Neuroscience Provider  Diego Figueroa MD as MD (Endocrinology, Diabetes, and Metabolism)  Diego Figueroa MD as Assigned Endocrinology Provider    Patient has been advised of split billing requirements and indicates understanding: Yes      Review of Systems   Constitutional, HEENT, cardiovascular, pulmonary, gi and gu systems are negative, except as otherwise noted.      Objective    /82   Pulse 60   Temp 97.5  F (36.4  C) (Tympanic)   Resp 18   Ht 1.753 m (5' 9\")   Wt 69.9 kg (154 lb 3.2 oz)   SpO2 98%   BMI 22.77 kg/m    Body mass index is 22.77 kg/m .  Physical Exam   GENERAL: alert and no distress  CV: regular rates and rhythm and no peripheral edema  MS: RLE exam shows mild erythema at base of 3rd toe nail, slightly ingrown nail  SKIN: clubbed great toe nail, tips of toes blue, + 2 dp pulse. Bilateral hands white and cold  NEURO: decreased tone - generalized " and mentation intact  PSYCH: mentation appears normal, affect normal/bright

## 2022-03-22 NOTE — PATIENT INSTRUCTIONS
Patient Education     Ingrown Toenail, Not Infected (Home Treatment)  An ingrown toenail occurs when the nail grows sideways into the skin next to the nail. This can cause pain, especially when wearing tight shoes. It can also lead to an infection with redness, swelling, and pus drainage. Most people respond to the treatments described here. But sometimes surgery is needed. The big toe is most often affected.    The most common cause of an ingrown toenail is trimming your nails wrong. Most people trim the nails too close to the skin and try to round the nail too tightly around the shape of the toe. When you do this, the nail can grow into the skin of your toe. It is safer to trim the nail ending in a straight line rather than a curve.   Home care  These guidelines will help you care for your toenail at home:     Soak the painful toe in warm water 3 to 4 times each day, for 10 to 20 minutes each time. Adding Epsom salt may be advised by your healthcare provider. Wash the entire foot with an antibacterial soap. Then keep it dry.    If there is redness or swelling around the toenail, apply an antibiotic ointment 3 times a day.    Put a small piece of rolled-up cotton under the corner of the nail. This helps the nail to grow outward, away from the cuticle.    Wear shoes that don t put pressure on the toes, such as a sandal or open shoe. Closed shoes should be big enough in the toes so that there is no pressure on the painful toe.    You may use acetaminophen or ibuprofen for pain, unless another pain medicine was prescribed. Talk with your healthcare provider before using these medicines if you have chronic liver or kidney disease or if you have ever had a stomach ulcer or digestive bleeding.  Prevention  The following tips will help you prevent ingrown toenails:    Don't wear pointed, tight, or narrow shoes.    Trim toenails once a month so they don t grow too long. Cut the nail straight across.  Follow-up  care  Follow up with your healthcare provider, or as advised.  When to seek medical advice  Call your healthcare provider right away if any of these occur:    Increasing redness, pain, or swelling of the toe    Tender red streaks in the skin leading toward the ankle    Pus or fluid drainage from the toe    Fever of 100.4 F (38 C) or higher, or as directed by your provider    The area does not heal with home treatment  [employer] last reviewed this educational content on 8/1/2019 2000-2021 [employer]. All rights reserved. This information is not intended as a substitute for professional medical care. Always follow your healthcare professional's instructions.           Patient Education     Ankle Brachial Index (WILBUR) Test  The ankle brachial index (WILBUR) is a simple test that compares the blood pressure measured at your ankle with the blood pressure measured at your arm. It's used to check for peripheral artery disease (PAD) in the legs, or to see if PAD is getting worse. It can also be used to check your risk for heart attack and stroke. A low WILBUR number may mean that you have PAD.   What is PAD?  A fatty substance called plaque can build up in your arteries. This causes a narrowing or blockage of arteries. PAD often affects the vessels that bring blood to the legs. The reduced blood flow can cause pain and numbness. A low WILBUR number may mean that your legs and feet aren t getting as much blood as they need. But an WILBUR test won t show exactly which blood vessels have become narrowed or blocked.   Why is this test done?  Your healthcare provider might want you to have an WILBUR test if you are at risk for PAD. Things that increase your risk for PAD include:     Smoking    Diabetes    Being older than age 70    High cholesterol    Coronary artery disease    Abnormal pulses in your lower legs    Being younger than age 50, with diabetes and another risk factor, such as smoking or high blood pressure  The WILBUR  test can:    Diagnose PAD and prevent it from getting worse    Find out if you have a high risk for coronary artery disease  Your healthcare provider also might recommend an WILBUR:    If you have symptoms of PAD, such as pain in your legs with activity. But not everyone with PAD has symptoms. This makes the test even more important.    To see how serious your PAD is. Your provider might order this test every year. This is to see if your condition is getting worse.    To see how well blood is flowing into your legs, if you ve had surgery on the blood vessels of your legs. Sometimes healthcare providers use WILBUR to check your risk for heart attack or stroke.  Before your test  There is very little you need to do to get ready for an WILBUR test:    You can eat as normal on the day of the test.    You should be able to take any medicines as usual before the procedure.    You may want to wear loose, comfortable clothes. This will let the technician easily place the blood pressure cuff on your arm and ankle.    You ll need to rest for at least 15 to 30 minutes before the procedure.    Ask if your healthcare provider has any special instructions.  During your test  The ankle brachial index test is very similar to a standard blood pressure test. During your WILBUR test:     You will lie flat during the procedure.    A technician will place a blood pressure cuff just above your ankle.    The technician will put an ultrasound probe over the artery. He or she will use this to listen to the blood flow through the vessel.    The technician will inflate the blood pressure cuff. He or she will increase the pressure until the blood stops flowing through the vessel. This may be a little uncomfortable. But it won t hurt.    The technician will slowly release the pressure in the cuff. The systolic pressure is the pressure at which the blood flow is heard again. That is the part of the blood pressure measurement needed for the WILBUR.    The  technician will repeat this process on your other ankle and on both of your arms.    Next, the technician will calculate the WILBUR. The top number (numerator) is the higher systolic blood pressure found in the ankles. The lower number (denominator) is the higher systolic blood pressure found in the arms.  Sometimes healthcare providers will combine an WILBUR test with an exercise test. You might have an WILBUR done before and right after exercise, to see how exercise changes this value.   After your test    You should be able to go back to your normal activities right after your WILBUR test.    Be sure to follow up with your healthcare provider about your results. In some cases, you may need follow-up testing to get more information about a blocked vessel. This might include an MRI or an arteriogram.    Talk with your provider about what your WILBUR value means for you.  If you have PAD, you may need treatment. Possible treatments include:    Stopping smoking    Treating high blood pressure, high cholesterol, and diabetes, if needed    Staying physically active    Eating a healthy diet    Taking medicine to increase blood flow to your legs or to prevent blood clots    Having procedures to restore blood flow, like angioplasty    Having surgery to your leg if the blockage is severe  Possible risks and complications  For most people, there are no risks linked to having an WILBUR test. But this test is not recommended if you have a blood clot in your leg. You may need a different type of test if you have severe pain in your legs.   [employer] last reviewed this educational content on 3/1/2020    8381-0306 [employer]. All rights reserved. This information is not intended as a substitute for professional medical care. Always follow your healthcare professional's instructions.

## 2022-03-31 ENCOUNTER — OFFICE VISIT (OUTPATIENT)
Dept: PODIATRY | Facility: CLINIC | Age: 67
End: 2022-03-31
Payer: COMMERCIAL

## 2022-03-31 VITALS
DIASTOLIC BLOOD PRESSURE: 89 MMHG | SYSTOLIC BLOOD PRESSURE: 153 MMHG | HEIGHT: 69 IN | HEART RATE: 58 BPM | WEIGHT: 154 LBS | BODY MASS INDEX: 22.81 KG/M2

## 2022-03-31 DIAGNOSIS — I73.01 RAYNAUD'S PHENOMENON WITH GANGRENE (H): Primary | ICD-10-CM

## 2022-03-31 PROCEDURE — 99203 OFFICE O/P NEW LOW 30 MIN: CPT | Performed by: PODIATRIST

## 2022-03-31 RX ORDER — NIFEDIPINE 30 MG/1
TABLET, EXTENDED RELEASE ORAL
COMMUNITY
Start: 2022-03-22 | End: 2022-07-26

## 2022-03-31 RX ORDER — PREDNISOLONE ACETATE 10 MG/ML
SUSPENSION/ DROPS OPHTHALMIC
COMMUNITY
Start: 2022-01-25

## 2022-03-31 NOTE — NURSING NOTE
"Chief Complaint   Patient presents with     Ingrown Toenail     right foot fourth toenail       Initial BP (!) 153/89   Pulse 58   Ht 1.753 m (5' 9\")   Wt 69.9 kg (154 lb)   BMI 22.74 kg/m   Estimated body mass index is 22.74 kg/m  as calculated from the following:    Height as of this encounter: 1.753 m (5' 9\").    Weight as of this encounter: 69.9 kg (154 lb).  Medications and allergies reviewed.      Erna KING MA    "

## 2022-03-31 NOTE — PATIENT INSTRUCTIONS
RAYNAUD'S PHENOMENON  Raynaud s phenomenon is a condition which results in a bluish-white discoloration of fingers and toes, often as a result of exposure to cold. Stress, smoking, and certain medications may trigger or worsen symptoms. The color change, which occurs from spasms in small blood vessels, becomes red and then returns to normal when blood flow resumes.    The condition most often affects women, with symptoms varying depending on the severity of the condition. Because there are no specific blood tests to diagnose this condition, the diagnosis is based on symptoms.    However, your doctor may order blood tests to determine whether the Raynaud s phenomenon is associated with certain autoimmune diseases or other medical conditions.  Treatment for Raynaud s phenomenon is aimed at prevention and protection of the digits.  Self-care and prevention steps, such as dressing in layers or wearing gloves or heavy socks, usually are effective in dealing with mild symptoms of Raynaud's. If these aren't adequate, however, medications are available to treat more-severe forms of the condition. The goals of treatment are to:   Reduce the number and severity of attacks   Prevent tissue damage   Treat any underlying disease or condition   Don't Smoke: Smoking causes skin temperature to drop by constricting blood vessels, which may lead to an attack. Inhaling secondhand smoke also may aggravate Raynaud's.   Exercise: Your doctor may encourage you to exercise regularly, particularly if you have primary Raynaud's. Exercise can increase circulation, among other health benefits.   Control Stress: Because stress may trigger an attack, learning to recognize and avoid stressful situations may help control the number of attacks.   Avoid Caffeine: Caffeine causes your blood vessels to narrow and may increase the signs and symptoms of Raynaud's.   Take Care of Your Hands and Feet: If you have Raynaud's, guard your hands and feet from  injury. Don't walk barefoot. Take care of your nails to avoid injuring sensitive toes and fingertips. In addition, avoid wearing anything that compresses blood vessels in your hands or feet, such as tight wristbands, rings or footwear.   Avoid Workplace Triggers: Avoiding tools that vibrate the hand may reduce the frequency of attacks.   Warm moisture wicking socks: Smart wool socks rather than cotton socks.  During an attack: What should you do?  What should you do if you're experiencing an attack of Raynaud's? The first and most important action is to warm your hands or feet or any other affected areas of skin. The following steps can help you gently warm your fingers and toes:   Move to a warmer area.   Place your hands under your armpits.   Wiggle your fingers and toes.   Make wide circles (windmills) with your arms.   Run warm -- but not hot -- water over your fingers and toes.   Massage your hands and feet.   If a stressful situation triggers an attack, you can help stop the attack by getting out of the stressful situation and relaxing. If you're trained in biofeedback, you can use this technique along with warming your hands or feet in water to help lessen the attack.     Lifestyle changes and supplements that encourage better circulation may be effective alternatives for managing Raynaud's. If you're interested, talk to your doctor about:   Fish Oil: Taking fish oil supplements could help improve your tolerance to cold and delay the narrowing of your blood vessels that triggers Raynaud's syndrome attacks.   Ginkgo: Ginkgo supplements could help decrease the number of Raynaud's attacks you have.   Biofeedback: Using your mind to control body temperature (biofeedback) may help decrease the severity and frequency of attacks. Biofeedback includes guided imagery to increase the temperature of hands and feet, deep breathing, and other relaxation exercises. Your doctor may be able to suggest a therapist who can  help you learn biofeedback techniques. Books and tapes also are available on the subject.   As with any supplement, be sure to talk to your doctor before adding it to your treatment regimen. Your doctor can warn you if there are any potential drug interactions or side effects of alternative treatments.   Coping with the stress and nuisance of Raynaud's takes patience and effort. Work with your doctor to manage your condition and maintain a positive attitude. The majority of people with Raynaud's respond to treatment.   Medications:  Depending on the cause of your symptoms, medications may help treat Raynaud's. To widen (dilate) blood vessels and promote circulation, your doctor may prescribe:   Calcium Channel Blockers: These drugs relax and open up small blood vessels in your hands and feet. They decrease the frequency and severity of attacks in most people with Raynaud's. These drugs can also help heal skin ulcers on your fingers or toes. Examples include nifedipine (Adalat CC, Afeditab CR, Procardia), amlodipine (Norvasc) and felodipine (Plendil).   Alpha Blockers: Some people find relief with drugs called alpha blockers, which counteract the actions of norepinephrine, a hormone that constricts blood vessels. Examples include prazosin (Minipress) and doxazosin (Cardura).   Vasodilators: Some doctors prescribe a vasodilator -- a drug that relaxes blood vessels -- such as nitroglycerin cream to your fingers to help heal skin ulcers. Your doctor may also prescribe vasodilator drugs that are commonly used to treat other conditions, but may effectively relieve the symptoms of Raynaud's. These drugs include the high blood pressure drug losartan (Cozaar), the erectile dysfunction medication sildenafil (Viagra, Revatio), the antidepressant medication fluoxetine (Prozac, Sarafem), and a class of medication called prostaglandins.   You and your doctor may find that one drug works better for you than another. Some drugs  used to treat Raynaud's have side effects that may require you to stop taking the medication. A drug may also lose effectiveness over time. Work with your doctor to find what works best for you.   Some medications actually can aggravate Raynaud's by leading to increased blood vessel spasm. Your doctor may recommend that you avoid taking:   Certain Over-the-Counter (OTC) Cold Drugs: Examples include drugs that contain pseudoephedrine (Chlor-Trimeton, Sudafed).   Beta Blockers: This class of drugs, used to treat high blood pressure and heart disease, includes metoprolol (Lopressor, Toprol XL), nadolol (Corgard) and propranolol (Inderal, Innopran XL).   Birth Control Pills: If you use birth control pills, you may wish to switch to another method of contraception because these drugs affect your circulation and may make you more prone to attacks. Talk to your doctor before stopping the pill.   If you have questions about how best to manage Raynaud's, contact your doctor. Your primary care doctor may refer you to a physician who specializes in treating Raynaud's.   Surgeries and Medical Procedures:  Sometimes in cases of severe Raynaud's, approaches other than medications may be a treatment option:   Nerve Surgery: Nerves called sympathetic nerves in your hands and feet control the opening and narrowing of blood vessels in your skin. Sometimes it's necessary in cases of severe Raynaud's to cut these nerves to interrupt their exaggerated response. Through small incisions in the affected hands or feet, a doctor strips away these tiny nerves around the blood vessels. The surgery, called sympathectomy, may reduce the frequency and duration of attacks, but it's not always successful.   Chemical Injection: Doctors can inject chemicals to block sympathetic nerves in affected hands or feet. You may need to have the procedure repeated if symptoms return or persist.   Amputation: Sometimes, doctors need to remove tissue damaged  from a lack of blood supply. This may include amputating a finger or toe affected by Raynaud's in which the blood supply has been completely blocked and the tissue has developed gangrene. But this is rare.         SMOKING CESSATION  What's in cigarette smoke? - Cigarette smoke contains over 4,000 chemicals. Nicotine is one of the main ingredients which is an insecticide/herbicide. It is poisonous to our nervous system, increases blood clotting risk, and decreases the body's defenses to fight off infection. Another chemical is Carbon Monoxide is an asphyxiating gas that permanently binds to blood cells and blocks the transport of oxygen. This leads to tissue death and decreases your metabolism. Tar is a chemical that coats your lungs and trachea which impairs new oxygen coming in and carbon dioxide getting out of your body.   How does smoking impact surgery? - Smoking is particularly hazardous with regards to surgery. Surgery puts stress on the body and a smoker's body is already under strain from these chemicals. Putting the two together, especially for an elective surgery, could be a recipe for disaster. Smoking before and after surgery increases your risk of heart problems, slow wound healing, delayed bone healing, blood clots, wound infection and anesthesia complications.   What are the benefits of quitting? - In 20 minutes your blood pressure will drop back down to normal. In 8 hours the carbon monoxide (a toxic gas) levels in your blood stream will drop by half, and oxygen levels will return to normal. In 48 hours your chance of having a heart attack will have decreased. All nicotine will have left your body. Your sense of taste and smell will return to a normal level. In 72 hours your bronchial tubes will relax, and your energy levels will increase. In 2 weeks your circulation will increase, and it will continue to improve for the next 10 weeks.    Recommendations for elective surgery - Ideally, patients  should quit smoking 8 weeks before and at least 2 weeks after elective surgery in order to avoid complications. Simply cutting back on the amount of cigarettes smoked per day does not offer any benefit or decrease the risk of poor wound healing, heart problems, and infection. Smokers should also start taking Vitamin C and B for two weeks before surgery and two weeks after surgery.    Ways to Stop Smokin. Nicotine patches, lozenges, or gum  2. Support Groups  3. Medications (see below)    List of Medications:  1. Varenicline Tartrate (CHANTIX) (may be discontinued by FDA as of 2021)  2. Bupropion HCL (WELLBUTRIN, ZYBAN) - note: make sure Wellbutrin is for smoking cessation and not other issues   3. Nicotine Patch (NICODERM)   4. Nicotine Inhaler (NICOTROL)   5. Nicotine Gum Nicotine Polacrilex   6. Nicotine Lozenge: Nicotine Polacrilex (COMMIT)   * [employer] offers a smoking support group as well!  Please visit: [email or web address]  If you are interested in these, ask about getting a prescription or talk to your primary care doctor about what may be the best way for you to quit.

## 2022-03-31 NOTE — PROGRESS NOTES
PATIENT HISTORY:  Tonya Rodriguez is a 66 year old female who presents to clinic in consultation at the request of  Jessica Mesa C.N.P. with a chief complaint of a painful fourth toe on the left foot.  The patient is seen by themselves.  The patient relates the pain is primarily located around the fourth toe on the left foot.  Any previous notes and studies that pertain to the patient's condition were reviewed.         Past Medical History:   Past Medical History:   Diagnosis Date     Chest pain, unspecified 3/22/04    10/10 initially inseverity - responded to Aspirin and two doses of Nitroglycerin sublingual      Herpes simplex with other ophthalmic complications     on valtrex     Lipoma of unspecified site     Lower left suprascapular     Migraine, unspecified, with intractable migraine, so stated, without mention of status migrainosus 1/20/2007    midrin - about 30 pill a year       Medications:   Current Outpatient Medications:      gabapentin (NEURONTIN) 100 MG capsule, Take 2 capsules (200 mg) by mouth At Bedtime, Disp: 180 capsule, Rfl: 3     imipramine (TOFRANIL) 50 MG tablet, TAKE 1 TABLET BY MOUTH AT BEDTIME GENERIC EQUIVALENT FOR TOFRANIL, Disp: 90 tablet, Rfl: 3     methimazole (TAPAZOLE) 10 MG tablet, TAKE TWO AND ONE-HALF TABLETS BY MOUTH DAILY AS DIRECTED. GENERIC EQUIVALENT FOR TAPAZOLE. MUST COMPLETE FOLLOW UP BEFORE FURTHER REFILLS., Disp: 225 tablet, Rfl: 3     metoprolol succinate ER (TOPROL-XL) 25 MG 24 hr tablet, TAKE 1 TABLET BY MOUTH DAILY. PLEASE MAKE A CLINIC VISIT TO BE SEEN IN PERSON FOR MEDICATION REFILLS, Disp: 90 tablet, Rfl: 3     NIFEdipine ER (ADALAT CC) 30 MG 24 hr tablet, Take 1 tablet (30 mg) by mouth daily, Disp: 30 tablet, Rfl: 1     NIFEdipine ER OSMOTIC (PROCARDIA XL) 30 MG 24 hr tablet, Take 1 tablet (30 mg) by mouth daily, Disp: , Rfl:      order for DME, Equipment being ordered: urinary catheter 6 times daily, Disp: 540 catheter, Rfl: 3     oxybutynin ER  "(DITROPAN XL) 15 MG 24 hr tablet, Take 2 tablets (30 mg) by mouth At Bedtime, Disp: 180 tablet, Rfl: 3     prednisoLONE acetate (PRED FORTE) 1 % ophthalmic suspension, instill 1 drop by ophthalmic route 4 times every day into the left eye, Disp: , Rfl:      simvastatin (ZOCOR) 20 MG tablet, Take 1 tablet (20 mg) by mouth At Bedtime, Disp: 90 tablet, Rfl: 3     traZODone (DESYREL) 50 MG tablet, Take 1 tablet (50 mg) by mouth nightly as needed for sleep, Disp: 90 tablet, Rfl: 3     valACYclovir (VALTREX) 500 MG tablet, Take 1 tablet (500 mg) by mouth daily Will prescribe only for 2 months to last you until you have an appointment with our [facility] doctors. Please schedule an appointment with your eye doctor at [phone]., Disp: 30 tablet, Rfl: 0     Allergies:    Allergies   Allergen Reactions     Cipro [Ciprofloxacin] Rash     gets yeast infections - BAD with.     Lactulose GI Disturbance     Caused Vomiting       Vitals: BP (!) 153/89   Pulse 58   Ht 1.753 m (5' 9\")   Wt 69.9 kg (154 lb)   BMI 22.74 kg/m    BMI= Body mass index is 22.74 kg/m .    LOWER EXTREMITY PHYSICAL EXAM    Dermatologic: Noted generalized cyanosis discoloration to the toes on both feet.  The fourth toe on the left foot appears to have distal necrosis of the epidermis.  No drainage noted.  Otherwise the skin is intact to left lower extremity without significant lesions, rash or abrasion.        Vascular: DP & PT pulses are intact & regular on the left.   CFT and skin temperature is normal to the left lower extremity.     Neurologic: Lower extremity sensation is intact to light touch.  No evidence of weakness in the left lower extremity.        Musculoskeletal: Patient is ambulatory without assistive device or brace.  No gross ankle deformity noted.  No foot or ankle joint effusion is noted.            ASSESSMENT / PLAN:     ICD-10-CM    1. Raynaud's phenomenon with gangrene (H)  I73.01     fourth toe left foot       I have explained to " Tonya about the conditions.  We discussed the underlying contributing factors to the condition as well as treatment options along with expected length of recovery.  At this time, the patient was advised to keep the feet warm on a daily basis with soaking in warm water and then thoroughly drying them.  The patient was advised to wear layered socks including wicking material and wool for better insulation.  The patient was instructed to return to the office if any  redness swelling or drainage is noted.    Tonya verbalized agreement with and understanding of the rational for the diagnosis and treatment plan.  All questions were answered to best of my ability and the patient's satisfaction. The patient was advised to contact the clinic with any questions that may arise after the clinic visit.      Disclaimer: This note consists of symbols derived from keyboarding, dictation and/or voice recognition software. As a result, there may be errors in the script that have gone undetected. Please consider this when interpreting information found in this chart.       JUSTICE Ro D.P.M., F.A.C.F.A.S.

## 2022-03-31 NOTE — LETTER
3/31/2022         RE: Tonya Rodriguez  [id number] [facility] [address]        Dear Colleague,    Thank you for referring your patient, Tonya Rodriguez, to the [facility]. Please see a copy of my visit note below.    PATIENT HISTORY:  Tonya Rodriguez is a 66 year old female who presents to clinic in consultation at the request of  Jessica Mesa C.N.P. with a chief complaint of a painful fourth toe on the left foot.  The patient is seen by themselves.  The patient relates the pain is primarily located around the fourth toe on the left foot.  Any previous notes and studies that pertain to the patient's condition were reviewed.         Past Medical History:   Past Medical History:   Diagnosis Date     Chest pain, unspecified 3/22/04    10/10 initially inseverity - responded to Aspirin and two doses of Nitroglycerin sublingual      Herpes simplex with other ophthalmic complications     on valtrex     Lipoma of unspecified site     Lower left suprascapular     Migraine, unspecified, with intractable migraine, so stated, without mention of status migrainosus 1/20/2007    midrin - about 30 pill a year       Medications:   Current Outpatient Medications:      gabapentin (NEURONTIN) 100 MG capsule, Take 2 capsules (200 mg) by mouth At Bedtime, Disp: 180 capsule, Rfl: 3     imipramine (TOFRANIL) 50 MG tablet, TAKE 1 TABLET BY MOUTH AT BEDTIME GENERIC EQUIVALENT FOR TOFRANIL, Disp: 90 tablet, Rfl: 3     methimazole (TAPAZOLE) 10 MG tablet, TAKE TWO AND ONE-HALF TABLETS BY MOUTH DAILY AS DIRECTED. GENERIC EQUIVALENT FOR TAPAZOLE. MUST COMPLETE FOLLOW UP BEFORE FURTHER REFILLS., Disp: 225 tablet, Rfl: 3     metoprolol succinate ER (TOPROL-XL) 25 MG 24 hr tablet, TAKE 1 TABLET BY MOUTH DAILY. PLEASE MAKE A CLINIC VISIT TO BE SEEN IN PERSON FOR MEDICATION REFILLS, Disp: 90 tablet, Rfl: 3     NIFEdipine ER (ADALAT CC) 30 MG 24 hr tablet, Take 1 tablet (30 mg) by mouth  "daily, Disp: 30 tablet, Rfl: 1     NIFEdipine ER OSMOTIC (PROCARDIA XL) 30 MG 24 hr tablet, Take 1 tablet (30 mg) by mouth daily, Disp: , Rfl:      order for DME, Equipment being ordered: urinary catheter 6 times daily, Disp: 540 catheter, Rfl: 3     oxybutynin ER (DITROPAN XL) 15 MG 24 hr tablet, Take 2 tablets (30 mg) by mouth At Bedtime, Disp: 180 tablet, Rfl: 3     prednisoLONE acetate (PRED FORTE) 1 % ophthalmic suspension, instill 1 drop by ophthalmic route 4 times every day into the left eye, Disp: , Rfl:      simvastatin (ZOCOR) 20 MG tablet, Take 1 tablet (20 mg) by mouth At Bedtime, Disp: 90 tablet, Rfl: 3     traZODone (DESYREL) 50 MG tablet, Take 1 tablet (50 mg) by mouth nightly as needed for sleep, Disp: 90 tablet, Rfl: 3     valACYclovir (VALTREX) 500 MG tablet, Take 1 tablet (500 mg) by mouth daily Will prescribe only for 2 months to last you until you have an appointment with our [facility] doctors. Please schedule an appointment with your eye doctor at [phone]., Disp: 30 tablet, Rfl: 0     Allergies:    Allergies   Allergen Reactions     Cipro [Ciprofloxacin] Rash     gets yeast infections - BAD with.     Lactulose GI Disturbance     Caused Vomiting       Vitals: BP (!) 153/89   Pulse 58   Ht 1.753 m (5' 9\")   Wt 69.9 kg (154 lb)   BMI 22.74 kg/m    BMI= Body mass index is 22.74 kg/m .    LOWER EXTREMITY PHYSICAL EXAM    Dermatologic: Noted generalized cyanosis discoloration to the toes on both feet.  The fourth toe on the left foot appears to have distal necrosis of the epidermis.  No drainage noted.  Otherwise the skin is intact to left lower extremity without significant lesions, rash or abrasion.        Vascular: DP & PT pulses are intact & regular on the left.   CFT and skin temperature is normal to the left lower extremity.     Neurologic: Lower extremity sensation is intact to light touch.  No evidence of weakness in the left lower extremity.        Musculoskeletal: Patient is " ambulatory without assistive device or brace.  No gross ankle deformity noted.  No foot or ankle joint effusion is noted.            ASSESSMENT / PLAN:     ICD-10-CM    1. Raynaud's phenomenon with gangrene (H)  I73.01     fourth toe left foot       I have explained to Tonya about the conditions.  We discussed the underlying contributing factors to the condition as well as treatment options along with expected length of recovery.  At this time, the patient was advised to keep the feet warm on a daily basis with soaking in warm water and then thoroughly drying them.  The patient was advised to wear layered socks including wicking material and wool for better insulation.  The patient was instructed to return to the office if any  redness swelling or drainage is noted.    Tonya verbalized agreement with and understanding of the rational for the diagnosis and treatment plan.  All questions were answered to best of my ability and the patient's satisfaction. The patient was advised to contact the clinic with any questions that may arise after the clinic visit.      Disclaimer: This note consists of symbols derived from keyboarding, dictation and/or voice recognition software. As a result, there may be errors in the script that have gone undetected. Please consider this when interpreting information found in this chart.       CHRISTINE Guy.P.M., F.A.C.F.A.S.        Again, thank you for allowing me to participate in the care of your patient.        Sincerely,        Kulwinder Ro DPM

## 2022-04-07 ENCOUNTER — HOSPITAL ENCOUNTER (OUTPATIENT)
Dept: ULTRASOUND IMAGING | Facility: CLINIC | Age: 67
Discharge: HOME | End: 2022-04-07
Attending: NURSE PRACTITIONER | Admitting: NURSE PRACTITIONER
Payer: COMMERCIAL

## 2022-04-07 DIAGNOSIS — I99.8 ISCHEMIA OF TOE: ICD-10-CM

## 2022-04-07 DIAGNOSIS — I73.00 RAYNAUD'S SYNDROME WITHOUT GANGRENE: ICD-10-CM

## 2022-04-07 PROCEDURE — 93922 UPR/L XTREMITY ART 2 LEVELS: CPT

## 2022-04-10 NOTE — MR AVS SNAPSHOT
After Visit Summary   7/10/2017    Tonya Rodriguez    MRN: [id number]           Patient Information     Date Of Birth          1955        Visit Information        Provider Department      7/10/2017 11:00 AM Dayne Taylor MD [facility]        Today's Diagnoses     Rash    -  1      Care Instructions      Self-Care for Skin Rashes     Pat your skin dry. Do not rub.     When your skin reacts to a substance your body is sensitive to, it can cause a rash. You can treat most rashes at home by keeping the skin clean and dry. Many rashes may get better on their own within 2 to 3 days. You may need medical attention if your rash itches, drains, or hurts, particularly if the rash is getting worse.  What can cause a skin rash?    Sun poisoning, caused by too much exposure to the sun    An irritant or allergic reaction to a certain type of food, plant, or chemical, such as  shellfish, poison ivy, and or cleaning products    An infection caused by a fungus (ringworm), virus (chickenpox), or bacteria (strep)    Bites or infestation caused by insects or pests, such as ticks, lice, or mites    Dry skin, which is often seen during the winter months and in older people  How can I control itching and skin damage?    Take soothing lukewarm baths in a colloidal oatmeal product. You can buy this at the [employer].    Do your best not to scratch. Clip fingernails short, especially in young children, to reduce skin damage if scratching does occur.    Use moisturizing skin lotion instead of scratching your dry skin.    Use sunscreen whenever going out into direct sun.    Use only mild cleansing agents whenever possible.    Wash with mild, nonirritating soap and warm water.    Wear clothing that breathes, such as cotton shirts or canvas shoes.    If fluid is seeping from the rash, cover it loosely with clean gauze to absorb the discharge.    Many rashes are contagious. Prevent the rash from spreading to  Patient with Hypercapnic and Hypoxic Respiratory failure which is Acute on chronic.  she is on home oxygen at 3  LPM. Supplemental oxygen was provided and noted- Oxygen Concentration (%):  [36-50] 44.   Signs/symptoms of respiratory failure include- tachypnea, use of accessory muscles and lethargy. Contributing diagnoses includes - COPD, Interstitial lung disease and Obesity Hypoventilation Labs and images were reviewed. Patient Has recent ABG, which has been reviewed. Will treat underlying causes and adjust management of respiratory failure as follows-     Continue Oxygen via bipap  4/9 improving patient is getting IV steroids IV antibiotics she is on her oxygen and less use of the BiPAP  4/10 increase lasix to bid - cotn iv abxs- wean iv steroids; cont o2 and bipap   others by washing your hands often before or after touching others with any skin rash.  Use medicine    Antihistamines such as diphenhydramine can help control itching. But use with caution because they can make you drowsy.    Using over-the-counter hydrocortisone cream on small rashes may help reduce swelling and itching    Most over-the-counter antifungal medicines can treat athlete s foot and many other fungal infections of the skin.  Check with your healthcare provider  Call your healthcare provider if:    You were told that you have a fungal infection on your skin to make sure you have the correct type of medicine.    You have questions or concerns about medicines or their side effects.     Call 911  Call 911 if either of these occur:    Your tongue or lips start to swell    You have difficulty breathing      Call your healthcare provider  Call your healthcare provider if any of these occur:    Temperature of more than 101.0 F (38.3 C), or as directed    Sore throat, a cough, or unusual fatigue    Red, oozy, or painful rash gets worse. These are signs of infection.    Rash covers your face, genitals, or most of your body    Crusty sores or red rings that begin to spread    You were exposed to someone who has a contagious rash, such as scabies or lice.    Red bull s-eye rash with a white center (a sign of Lyme disease)    You were told that you have resistant bacteria (MRSA) on your skin.   Date Last Reviewed: 5/12/2015 2000-2017 [employer]. [address] [place] [address], [place] [address]. All rights reserved. This information is not intended as a substitute for professional medical care. Always follow your healthcare professional's instructions.                Follow-ups after your visit        Who to contact     If you have questions or need follow up information about today's clinic visit or your schedule please contact [facility] directly at [phone].  Normal or non-critical lab  "and imaging results will be communicated to you by MyChart, letter or phone within 4 business days after the clinic has received the results. If you do not hear from us within 7 days, please contact the clinic through [employer] or phone. If you have a critical or abnormal lab result, we will notify you by phone as soon as possible.  Submit refill requests through [employer] or call your pharmacy and they will forward the refill request to us. Please allow 3 business days for your refill to be completed.          Additional Information About Your Visit        [employer] Information     [employer] lets you send messages to your doctor, view your test results, renew your prescriptions, schedule appointments and more. To sign up, go to [email or web address] . Click on \"Log in\" on the left side of the screen, which will take you to the Welcome page. Then click on \"Sign up Now\" on the right side of the page.     You will be asked to enter the access code listed below, as well as some personal information. Please follow the directions to create your username and password.     Your access code is: [email or web address]  Expires: 10/8/2017 11:22 AM     Your access code will  in 90 days. If you need help or a new code, please call your [place] clinic or [phone].        Care EveryWhere ID     This is your Care EveryWhere ID. This could be used by other organizations to access your [place] medical records  [id number]        Your Vitals Were     Pulse Temperature Height Pulse Oximetry BMI (Body Mass Index)       71 98.3  F (36.8  C) (Tympanic) 5' 9\" (1.753 m) 99% 23.88 kg/m2        Blood Pressure from Last 3 Encounters:   07/10/17 107/75   16 110/69   16 130/70    Weight from Last 3 Encounters:   07/10/17 161 lb 11.2 oz (73.3 kg)   16 165 lb 6.4 oz (75 kg)   10/23/15 172 lb 3.2 oz (78.1 kg)              Today, you had the following     No orders found for display         Today's Medication Changes          These " changes are accurate as of: 7/10/17 11:22 AM.  If you have any questions, ask your nurse or doctor.               Start taking these medicines.        Dose/Directions    triamcinolone 0.1 % cream   Commonly known as:  KENALOG   Used for:  Rash   Started by:  Dayne Taylor MD        Apply sparingly to affected area three times daily for 14 days.   Quantity:  30 g   Refills:  1            Where to get your medicines      These medications were sent to [id number] - [place], [place] - [address]  [address] [address]     Phone:  [phone]     triamcinolone 0.1 % cream                Primary Care Provider Office Phone # Fax #    Yasmin Adrianna Crowell [phone]       [facility] [address] [address]        Equal Access to Services     BERKLEY PAREDES : Hadii senait benson hadasho Soomaali, waaxda luqadaha, qaybta kaalmada adeegyada, jade robles . So [facility] [phone].    ATENCIÓN: Si habla español, tiene a santiago disposición servicios gratuitos de asistencia lingüística. Katerina al [phone].    We comply with applicable federal civil rights laws and [place] laws. We do not discriminate on the basis of race, color, national origin, age, disability sex, sexual orientation or gender identity.            Thank you!     Thank you for choosing [facility]  for your care. Our goal is always to provide you with excellent care. Hearing back from our patients is one way we can continue to improve our services. Please take a few minutes to complete the written survey that you may receive in the mail after your visit with us. Thank you!             Your Updated Medication List - Protect others around you: Learn how to safely use, store and throw away your medicines at www.disposemymeds.org.          This list is accurate as of: 7/10/17 11:22 AM.  Always use your most recent med list.                   Brand  Name Dispense Instructions for use Diagnosis    albuterol 108 (90 BASE) MCG/ACT Inhaler    PROAIR HFA/PROVENTIL HFA/VENTOLIN HFA    1 Inhaler    Inhale 2 puffs into the lungs every 6 hours as needed for shortness of breath / dyspnea or wheezing    Acute bronchospasm       FLUoxetine 40 MG capsule    PROzac    30 capsule    Take 1 capsule (40 mg) by mouth daily    Recurrent major depression in complete remission (H)       gabapentin 100 MG capsule    NEURONTIN    10 capsule    Take 1-2 capsules by mouth every night at bedtime as needed for restless legs.    Restless leg       imipramine 50 MG tablet    TOFRANIL    90 tablet    Take 1 tablet (50 mg) by mouth At Bedtime    Insomnia       order for [facility]     540 catheter    Equipment being ordered: urinary catheter 6 times daily    Personal history of other disorder of urinary system, Multiple sclerosis (H)       oxybutynin chloride 15 MG Tb24    DITROPAN XL    180 tablet    Take 1 tablet (15 mg) by mouth 2 times daily    Urinary incontinence       simvastatin 20 MG tablet    ZOCOR    90 tablet    Take 1 tablet (20 mg) by mouth At Bedtime    Mixed hyperlipidemia       traZODone 50 MG tablet    DESYREL    30 tablet    Take 1 tablet by mouth every other night at bedtime.    Problems related to lack of adequate sleep, Depressive disorder, not elsewhere classified, Backache, unspecified, Esophageal reflux, Multiple sclerosis (H), Migraine, unspecified, with intractable migraine, so stated, without mention of status migrainosus, Hand pain, Preventative health care       triamcinolone 0.1 % cream    KENALOG    30 g    Apply sparingly to affected area three times daily for 14 days.    Rash       valACYclovir 500 MG tablet    VALTREX    90 tablet    Take 1 tablet (500 mg) by mouth daily    Herpes keratitis

## 2022-04-11 NOTE — RESULT ENCOUNTER NOTE
Namrata Castaneda    Your ultrasound of the legs is normal. No lack of blood flow. Follow up in clinic with me to recheck blood pressure and see how the Nifedipine is going.  Please let us know if you have any questions.     Take care,    PRISCILLA Jain CNP Please continue to take ASA 81mg & Lipitor 40mg everyday for stroke prevention.   I am requesting you to see PCP for follow up blood work and also to see cardiologist for MARZENA to complete workup.  I am also referring you to see Hematologist for follow up on hypercoagulable workup, specifically the protein C activity.             Protein C activity  Protein C levels may be increased in the following settings; these increases generally do not affect patient management but are worth noting if the level is slightly above the threshold for deficiency:  ?Nephrotic syndrome - Patients with nephrotic syndrome often have elevated levels of protein C. It appears the hypercoagulable state is due to effects on other hemostatic factors.   ?Hyperlipidemia - Patients with hyperlipidemia may have increased protein C levels. In one study of 150 adults, mean levels of protein C activity melissa approximately 25 percent as total cholesterol and triglyceride levels increased from the 5th to the 95th percentile.  ?Normal aging - Normal aging is associated with modest increases in protein C levels (by approximately 4 percent per decade).           STROKE EDUCATION  1. I have discussed the patient's stroke risk factors and the importance to modify them in order to reduce the risk of future events.   2. Discussed the importance of a healthy diet and daily exercise in order to reduce the risk of future strokes.  3. Reviewed the usual stroke warning signs and symptoms, and the need to activate EMS (call 911) as soon as the symptoms present.   *Sudden onset numbness or weakness of the face, arm, or leg; especially on one side of he body  *Sudden confusion, trouble speaking, or understanding  *Sudden trouble seeing in one or both eyes  *Sudden trouble walking, dizziness, loss of coordination  *Sudden severe headache with no known cause  4. Discussed target goal range for BP <140/90 diabetic patients <130/80  5. Discussed target goal range for  Lipids- Total Cholesterol <200, LDL <100 diabetic patients <70  6. Discussed target goal range for HgA1c <7.0  7. Discussed mportance of evaluation for and control of sleep apnea  8. I have given the patient/caregiver written instructions and information regarding stroke.        Recommended Mediterranean dietEating Heart-Healthy Food: Using the DASH Plan  Eating for your heart doesnt have to be hard or boring. You just need to know how to make healthier choices. The DASH eating plan has been developed to help you do just that. DASH stands for Dietary Approaches to Stop Hypertension. It is a plan that has been proven to be healthier for your heart and to lower your risk for high blood pressure. It can also help lower your risk for cancer, heart disease, osteoporosis, and diabetes.  Choosing from Each Food Group  Choose foods from each of the food groups below each day. Try to get the recommended number of servings for each food group. The serving numbers are based on a diet of 2,000 calories a day. Talk to your doctor if youre unsure about your calorie needs.  Grains   Servings: 7-8 a day  A serving is:  · 1 slice bread  · 1 ounce dry cereal  · half a cup cooked rice or pasta  Best choices: Whole grains and any grains high in fiber.  Vegetables   Servings: 4-5 a day  A serving is:  · 1 cup raw leafy vegetable  · Half a cup cooked vegetable  · Three-quarter cup vegetable juice  Best choices: Fresh or frozen vegetable prepared without too much added salt or fat.    Fruits   Servings: 4-5 a day  A serving is:  · Three-quarter cup fruit juice  · 1 medium fruit  · One-quarter cup dried fruit  · One-half cup fresh, frozen, or canned fruit  Best choices: A variety of fresh fruits of different colors. Whole fruits are a much better choice than fruit juices.  Low-fat or Fat Free Dairy   Servings: 2-3 a day  A serving is:  · 8 ounces milk  · 1 cup yogurt  · One and a half ounces cheese  Best choices: Skim or 1% milk, low-fat  or fat free yogurt or buttermilk, and low-fat cheeses.       Meat, Poultry, Fish   Servings: 2 or fewer a day  A serving is:  · 3 ounces cooked meat, poultry, or fish  Best choices: Lean meats and fish. Trim away visible fat. Broil, roast, or boil instead of frying. Remove skin from poultry before eating.  Nuts, Seeds, Beans   Servings: 4-5 a week  A serving is:  · One third cup nuts (or one and a half ounces)  · 2 tablespoons sunflower seeds  · Half a cup cooked beans  Best choices: Dry roasted nuts with no salt added, lentils, kidney beans, garbanzo beans, and whole tellez beans.    Fats and Oils   Servings: 2 a day  A serving is:  · 1 teaspoon vegetable oil  · 1 teaspoon soft margarine  · 1 tablespoon low-fat mayonnaise  · 1 teaspoon regular mayonnaise  · 2 tablespoons light salad dressing  · 1 tablespoon regular salad dressing  Best choices: Monounsaturated and polyunsaturated fats such as olive, canola, or safflower oil.  Sweets   Servings: 5 a week or fewer  A serving is:  · 1 tablespoon sugar, maple syrup, or honey  · 1 tablespoon jam or jelly  · 1 half-ounce jelly beans (about 15)  · 8 ounces lemonade  Best choices: Dried fruit can be a satisfying sweet. Choose low-fat sweets when possible. And watch your serving sizes!

## 2022-05-27 ENCOUNTER — TRANSFERRED RECORDS (OUTPATIENT)
Dept: HEALTH INFORMATION MANAGEMENT | Facility: CLINIC | Age: 67
End: 2022-05-27
Payer: COMMERCIAL

## 2022-06-08 ENCOUNTER — OFFICE VISIT (OUTPATIENT)
Dept: NEUROLOGY | Facility: CLINIC | Age: 67
End: 2022-06-08
Payer: COMMERCIAL

## 2022-06-08 VITALS
WEIGHT: 154 LBS | BODY MASS INDEX: 22.81 KG/M2 | HEART RATE: 74 BPM | HEIGHT: 69 IN | DIASTOLIC BLOOD PRESSURE: 82 MMHG | SYSTOLIC BLOOD PRESSURE: 118 MMHG | RESPIRATION RATE: 16 BRPM

## 2022-06-08 DIAGNOSIS — R32 URINARY INCONTINENCE, UNSPECIFIED TYPE: ICD-10-CM

## 2022-06-08 DIAGNOSIS — G47.00 INSOMNIA, UNSPECIFIED TYPE: ICD-10-CM

## 2022-06-08 DIAGNOSIS — G25.81 RESTLESS LEG: ICD-10-CM

## 2022-06-08 DIAGNOSIS — G35 MULTIPLE SCLEROSIS (H): ICD-10-CM

## 2022-06-08 PROCEDURE — 99214 OFFICE O/P EST MOD 30 MIN: CPT | Performed by: PSYCHIATRY & NEUROLOGY

## 2022-06-08 RX ORDER — TRAZODONE HYDROCHLORIDE 50 MG/1
50 TABLET, FILM COATED ORAL
Qty: 90 TABLET | Refills: 3 | Status: SHIPPED | OUTPATIENT
Start: 2022-06-08 | End: 2023-04-24

## 2022-06-08 RX ORDER — IMIPRAMINE HCL 50 MG
TABLET ORAL
Qty: 90 TABLET | Refills: 3 | Status: SHIPPED | OUTPATIENT
Start: 2022-06-08 | End: 2023-01-23

## 2022-06-08 RX ORDER — OXYBUTYNIN CHLORIDE 15 MG/1
30 TABLET, EXTENDED RELEASE ORAL AT BEDTIME
Qty: 180 TABLET | Refills: 3 | Status: SHIPPED | OUTPATIENT
Start: 2022-06-08 | End: 2023-04-24

## 2022-06-08 RX ORDER — GABAPENTIN 100 MG/1
200 CAPSULE ORAL AT BEDTIME
Qty: 180 CAPSULE | Refills: 3 | Status: SHIPPED | OUTPATIENT
Start: 2022-06-08 | End: 2024-04-09

## 2022-06-08 NOTE — LETTER
6/8/2022         RE: Tonya Rodriguez  [id number]  [facility] [address]        Dear Colleague,    Thank you for referring your patient, Tonya Rodriguez, to the [facility] [place]. Please see a copy of my visit note below.    In person visit    HPI  8/4/2021, in person consultation  12/8/2021, in person visit  6/8/2022, in person visit      66-year-old being followed neurologically for:  Secondary progressive MS  Diagnosis in the late 1980s  Previously treated with Copaxone  Now off all immunosuppressive therapy times a couple of years    Since last visit  No hospitalizations  No surgeries  No major illnesses    Did have a fall was just trying to open the blinds and lost her balance no specific injury  Does not go out much uses the walker indoors does have a motorized scooter  She and her  have moved to 1 level living and [facility]        A.  Secondary progressive MS       Original diagnosis of MS late 1980s, presented with gait difficulties falling bladder problems.       Original work-up by Dr. Goins with lumbar puncture positive for MS       Treated with Copaxone in the past had some injection site irritation.       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 Luis Antonio Bray, recommended a trial of Cytoxan patient chose not to pursue that        Followed by Dr. Mukherjee from [id number]        Considered Tecfidera in 2015.         now off all immunosuppressive therapies for couple of years      Patient passed her swallow study in August 2021  Still need to be  careful  Lives on 1 level now  In past staircase had motorized lift seat that she used ( moved )  Has a walk-in shower with hand-held sprayer,grab bar  milaelvia high , with rails  No longer drives  As a hospital bed so that she can sit up in bed and do some crocheting      Did get a bed that elevates so that it is easier for her to sleep at night  Uses oxybutynin at nighttime so she does not have to get up to urinate frequently did talk about bedside commode in the future if necessary  Significant urinary difficulties follows with urology is supposed to self cath every 3-4 hours    Has a motorized scooter to get around inside the home    When she goes to appointments though this is too heavy to transport to visits such as medical visits    Will write a prescription for medical durable equipment  Lightweight collapsible wheelchair self propel  For use to get to medical appointments  Diagnosis multiple sclerosis  Severe ataxia high risk for falls and injury        B.  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  Has had hyperthyroidism in the past with proptosis  Has difficulty with swallowing coughs when she eats certain foods such as lettuce or meats that are hard to chew do not go down as well  Significant ataxia with left-sided weakness and clumsiness greater than right  Has a lift on the staircase  Significant urinary difficulties follows with urology is supposed to self cath every 3-4 hours  No longer drives  At home lives with her   Staircase has a motorized lift seat that she uses  As a walk-in shower with a shower bar  Has a hand-held sprayer  Uses a 4 wheeled walker with seat and hand brakes  Does have a powered motorized scooter but it is  "too heavy to transport outside the home for visits  Question whether they should switch out the seat in the shower so that she can use it as she thinks it is too small  Does follow with urology for her urinary retention and self caths about every 4 hours  Poor vision which also affects balance is going to be seeing the eye doctor shortly        Neurologic history summary  Diagnosed with multiple sclerosis in the late .  Presented with difficulty with gait falling down and bladder problems.  Work-up by Dr. Goins with lumbar puncture positive for MS  Treated with Copaxone in the past had some injection site irritation.  In  had reports of some memory difficulties  Has had significant bladder control problems  Trouble with dysarthria and speech  Also had significant difficulty with fatigue  Has been totally disabled since   Around  seen at the [facility] Dr. Luis Antonio Bray, recommended a trial of Cytoxan patient chose not to pursue that  Followed by Dr. Mukherjee from [id number]  Consider Tecfidera in       Past medical history  Multiple sclerosis onset   Left eye herpetic infection with visual loss around   Urinary incontinence  Insomnia  Hyperlipidemia  Hyper thyroidism/proptosis  Restless leg syndrome  Depression  Low back pain      Habits  History of smoking (has cut down to half a pack per day which \"is good for her\")  Does not use alcohol  Totally disabled since   Past driving restrictions, no freeway driving, no rush hour driving last evaluated 2017  No longer drives      Family history  Father with lung cancer and MI  at age 66  Mother with MI and high blood pressure  at age 73  Brother  in motor vehicle accident  Sister with cervical cancer      Work-up  MRI scan brain 2005 increasing plaque load compared to 1999 no active plaques  MRI brain 2007 moderate white matter changes stable compared to   MRI brain 2014, multiple new " chronic lesions compared to previous scan no active lesions seen (compared to 2010)  MRI scan brain November 2015  A.  Diffuse volume loss and cerebral white matter changes consistent with multiple sclerosis  B.  No active lesions seen on contrast scan  Swallow study 8/13/2021  No aspiration    MoCA  8/4/2021, 25/30    Exam    Review of system  Pertinent positives and negatives    No headache no chest pain no shortness of breath no nausea vomiting no diarrhea no fever chills    Does have diplopia  Blurry vision worse on the left than the right chronic  Dysarthric speech  Dysphagia coughs when eating  Clumsy limbs worse on the left and worse in the legs and the arms  Poor gait ataxic uses a walker  Urinary incontinence and retention self caths    Otherwise review systems negative    General exam  Blood pressure 118/82, pulse 74  HEENT significant for proptosis  Lungs decreased breath sounds bilaterally history of smoker possible COPD no wheezing  Heart rate regular  Abdomen soft positive bowel sounds  Symmetrical pulses  No edema the feet    Neurologic exam  Alert oriented x3  Prosody of speech dysarthric halting  Normal naming  Normal comprehension  Normal repetition  No aphasia  No neglect  Formal [place] memory testing 25 out of 30    Oriented x3 he knows the president the  recent crises in the news is fairly up-to-date with current events stable memory.    Cranial nerves II through XII significant for  Unreactive left pupil  Ophthalmoplegia with poor eye saccades (also has proptosis)  Blurry vision worse in the left than the eye with monocular testing  No discrete visual field cut noted  Dysarthric speech tongue twisters thick  Face relatively symmetrical though  Dysarthric speech  Unreactive left pupil optic pallor left greater than right  Dysmetria bilaterally        Upper extremities  Dysmetria with finger-nose  No specific drift    Lower extremities  Left iliopsoas slightly weaker than the  right  Increased tone in the left lower extremity compared to the right but present bilaterally    Clumsy dysmetria bilateral lower extremities worse on the left than the right    Reflexes  Brisk in the upper extremities  Brisk at the knees with a little bit of overflow  Absent at the ankles  Toes equivocal  Negative Azevedo reflex    Gait  Needs both hands to stand up  With the four-wheel walker with hand brake has a spastic ataxic gait  Drags her left leg more so than the right  Turns are slow            Assessment/plan    1.  Secondary progressive MS onset late 1980s with progressive MRI scan changes as above      2.  History of left eye herpes infection with visual loss/hyperthyroidism with proptosis    3.  Dysarthria/visual changes/gait difficulties paresthesias/bladder difficulties/mood difficulties/memory difficulties, as part of her MS and medical issues    4.  Urinary dysfunction from MS follows with urology self caths every 4 to every 3 hours dexterity in hands enough to do the self cath still    5.  Decreased breath sounds history of continuing to smoke COPD discussed with patient cutting down or quitting smoking        Diagnosis  Secondary progressive MS  No longer on immune modulating medications  Originally diagnosed in the late 1980s    Plan  Formal physical therapy eval to see if we can maximize gait safety  Formal video swallow with speech path to see if we can adjust diet to avoid pneumonias  Continue to follow with urology with instructions for self cathing every 3-every 4 hours  Current medications filled by primary MD     Neurontin 100 mg tab, 2 tabs nightly     Imipramine 50 mg tab, 1 tab nightly     Oxybutynin ER 15 mg tab, 2 tabs nightly     Trazodone 50 mg nightly    See formal med list for other meds    Reviewed gait safety  As moving into 1 level living    Reviewed medication side effects and benefits    32 minutes total care time today    Patient will follow-up in April 2023 has  difficulty traveling during the winter months.  Medications failed    As part of visit today  Reviewed associated eye clinic notes from 6/3/2022, has primary open-angle glaucoma and HSV keratitis  Reviewed podiatrist notes 3/31/2022 difficulty with left foot pain  Reviewed notes 3/22/2022 Raynaud's type phenomenon causing increased symptoms, had blood flow studies in the lower extremities that were normal reviewed      Again, thank you for allowing me to participate in the care of your patient.        Sincerely,        Devendra Tabares MD

## 2022-06-08 NOTE — PROGRESS NOTES
In person visit    HPI  8/4/2021, in person consultation  12/8/2021, in person visit  6/8/2022, in person visit      66-year-old being followed neurologically for:  Secondary progressive MS  Diagnosis in the late 1980s  Previously treated with Copaxone  Now off all immunosuppressive therapy times a couple of years    Since last visit  No hospitalizations  No surgeries  No major illnesses    Did have a fall was just trying to open the blinds and lost her balance no specific injury  Does not go out much uses the walker indoors does have a motorized scooter  She and her  have moved to 1 level living and [facility]  Secondary progressive MS       Original diagnosis of MS late 1980s, presented with gait difficulties falling bladder problems.       Original work-up by Dr. Goins with lumbar puncture positive for MS       Treated with Copaxone in the past had some injection site irritation.       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 Luis Antonio Bray, recommended a trial of Cytoxan patient chose not to pursue that        Followed by Dr. Mukherjee from [id number]        Considered Tecfidera in 2015.         now off all immunosuppressive therapies for couple of years      Patient passed her swallow study in August 2021  Still need to be careful  Lives on 1 level now  In past staircase had motorized lift seat that she used ( moved )  Has a walk-in shower with hand-held sprayer,grab bar  toliet high , with rails  No longer drives  As a hospital bed so that she can sit up in bed and do some  amish      Did get a bed that elevates so that it is easier for her to sleep at night  Uses oxybutynin at nighttime so she does not have to get up to urinate frequently did talk about bedside commode in the future if necessary  Significant urinary difficulties follows with urology is supposed to self cath every 3-4 hours    Has a motorized scooter to get around inside the home    When she goes to appointments though this is too heavy to transport to visits such as medical visits    Will write a prescription for medical durable equipment  Lightweight collapsible wheelchair self propel  For use to get to medical appointments  Diagnosis multiple sclerosis  Severe ataxia high risk for falls and injury        B.  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  Has had hyperthyroidism in the past with proptosis  Has difficulty with swallowing coughs when she eats certain foods such as lettuce or meats that are hard to chew do not go down as well  Significant ataxia with left-sided weakness and clumsiness greater than right  Has a lift on the staircase  Significant urinary difficulties follows with urology is supposed to self cath every 3-4 hours  No longer drives  At home lives with her   Staircase has a motorized lift seat that she uses  As a walk-in shower with a shower bar  Has a hand-held sprayer  Uses a 4 wheeled walker with seat and hand brakes  Does have a powered motorized scooter but it is too heavy to transport outside the home for visits  Question whether they should switch out the seat in the shower so that she can use it as she thinks it is too small  Does follow with urology for her urinary retention and self caths about every 4  "hours  Poor vision which also affects balance is going to be seeing the eye doctor shortly        Neurologic history summary  Diagnosed with multiple sclerosis in the late .  Presented with difficulty with gait falling down and bladder problems.  Work-up by Dr. Goins with lumbar puncture positive for MS  Treated with Copaxone in the past had some injection site irritation.  In  had reports of some memory difficulties  Has had significant bladder control problems  Trouble with dysarthria and speech  Also had significant difficulty with fatigue  Has been totally disabled since   Around  seen at the [facility] Dr. Luis Antonio Bray, recommended a trial of Cytoxan patient chose not to pursue that  Followed by Dr. Mukherjee from [id number]  Consider Tecfidera in       Past medical history  Multiple sclerosis onset 1980s  Left eye herpetic infection with visual loss around   Urinary incontinence  Insomnia  Hyperlipidemia  Hyper thyroidism/proptosis  Restless leg syndrome  Depression  Low back pain      Habits  History of smoking (has cut down to half a pack per day which \"is good for her\")  Does not use alcohol  Totally disabled since   Past driving restrictions, no freeway driving, no rush hour driving last evaluated   No longer drives      Family history  Father with lung cancer and MI  at age 66  Mother with MI and high blood pressure  at age 73  Brother  in motor vehicle accident  Sister with cervical cancer      Work-up  MRI scan brain 2005 increasing plaque load compared to 1999 no active plaques  MRI brain 2007 moderate white matter changes stable compared to   MRI brain 2014, multiple new chronic lesions compared to previous scan no active lesions seen (compared to )  MRI scan brain 2015  A.  Diffuse volume loss and cerebral white matter changes consistent with multiple sclerosis  B.  No active lesions seen on contrast " scan  Swallow study 8/13/2021  No aspiration    MoCA  8/4/2021, 25/30    Exam    Review of system  Pertinent positives and negatives    No headache no chest pain no shortness of breath no nausea vomiting no diarrhea no fever chills    Does have diplopia  Blurry vision worse on the left than the right chronic  Dysarthric speech  Dysphagia coughs when eating  Clumsy limbs worse on the left and worse in the legs and the arms  Poor gait ataxic uses a walker  Urinary incontinence and retention self caths    Otherwise review systems negative    General exam  Blood pressure 118/82, pulse 74  HEENT significant for proptosis  Lungs decreased breath sounds bilaterally history of smoker possible COPD no wheezing  Heart rate regular  Abdomen soft positive bowel sounds  Symmetrical pulses  No edema the feet    Neurologic exam  Alert oriented x3  Prosody of speech dysarthric halting  Normal naming  Normal comprehension  Normal repetition  No aphasia  No neglect  Formal [place] memory testing 25 out of 30    Oriented x3 he knows the president the  recent crises in the news is fairly up-to-date with current events stable memory.    Cranial nerves II through XII significant for  Unreactive left pupil  Ophthalmoplegia with poor eye saccades (also has proptosis)  Blurry vision worse in the left than the eye with monocular testing  No discrete visual field cut noted  Dysarthric speech tongue twisters thick  Face relatively symmetrical though  Dysarthric speech  Unreactive left pupil optic pallor left greater than right  Dysmetria bilaterally        Upper extremities  Dysmetria with finger-nose  No specific drift    Lower extremities  Left iliopsoas slightly weaker than the right  Increased tone in the left lower extremity compared to the right but present bilaterally    Clumsy dysmetria bilateral lower extremities worse on the left than the right    Reflexes  Brisk in the upper extremities  Brisk at the knees with a little  bit of overflow  Absent at the ankles  Toes equivocal  Negative Azevedo reflex    Gait  Needs both hands to stand up  With the four-wheel walker with hand brake has a spastic ataxic gait  Drags her left leg more so than the right  Turns are slow            Assessment/plan    1.  Secondary progressive MS onset late 1980s with progressive MRI scan changes as above      2.  History of left eye herpes infection with visual loss/hyperthyroidism with proptosis    3.  Dysarthria/visual changes/gait difficulties paresthesias/bladder difficulties/mood difficulties/memory difficulties, as part of her MS and medical issues    4.  Urinary dysfunction from MS follows with urology self caths every 4 to every 3 hours dexterity in hands enough to do the self cath still    5.  Decreased breath sounds history of continuing to smoke COPD discussed with patient cutting down or quitting smoking        Diagnosis  Secondary progressive MS  No longer on immune modulating medications  Originally diagnosed in the late 1980s    Plan  Formal physical therapy eval to see if we can maximize gait safety  Formal video swallow with speech path to see if we can adjust diet to avoid pneumonias  Continue to follow with urology with instructions for self cathing every 3-every 4 hours  Current medications filled by primary MD     Neurontin 100 mg tab, 2 tabs nightly     Imipramine 50 mg tab, 1 tab nightly     Oxybutynin ER 15 mg tab, 2 tabs nightly     Trazodone 50 mg nightly    See formal med list for other meds    Reviewed gait safety  As moving into 1 level living    Reviewed medication side effects and benefits    32 minutes total care time today    Patient will follow-up in April 2023 has difficulty traveling during the winter months.  Medications failed    As part of visit today  Reviewed associated eye clinic notes from 6/3/2022, has primary open-angle glaucoma and HSV keratitis  Reviewed podiatrist notes 3/31/2022 difficulty with left foot  pain  Reviewed notes 3/22/2022 Raynaud's type phenomenon causing increased symptoms, had blood flow studies in the lower extremities that were normal reviewed

## 2022-07-18 ENCOUNTER — TELEPHONE (OUTPATIENT)
Dept: FAMILY MEDICINE | Facility: CLINIC | Age: 67
End: 2022-07-18

## 2022-07-18 DIAGNOSIS — R32 URINARY INCONTINENCE, UNSPECIFIED TYPE: ICD-10-CM

## 2022-07-18 DIAGNOSIS — G35 MULTIPLE SCLEROSIS (H): Primary | ICD-10-CM

## 2022-07-18 DIAGNOSIS — Z78.9 SELF-CATHETERIZES URINARY BLADDER: ICD-10-CM

## 2022-07-18 DIAGNOSIS — N31.9 NEUROGENIC BLADDER: ICD-10-CM

## 2022-07-18 NOTE — TELEPHONE ENCOUNTER
This order require a face to face visit documentation in the order.  Please schedule a virtual VIDEO visit.  Robb Cabrera MD  Family Medicine

## 2022-07-18 NOTE — TELEPHONE ENCOUNTER
[facility]: [facility]: [phone] calling.   Trying to get DME order renewed.     Per [place]: Previous catheter prescription has . Needs new prescription for 16 fr catheters. Please provide refills for 6 months or 12 months. Qty 200 per month.     Please call [facility] with any questions or fax new order to [phone].    Thank you,   Taya ANTHONY RN  Specialty Clinics

## 2022-07-19 NOTE — TELEPHONE ENCOUNTER
Called and informed Pt and assisted her in setting up a video visit on 7/26/22.     Salina Martines RN

## 2022-07-26 ENCOUNTER — VIRTUAL VISIT (OUTPATIENT)
Dept: FAMILY MEDICINE | Facility: CLINIC | Age: 67
End: 2022-07-26
Payer: COMMERCIAL

## 2022-07-26 DIAGNOSIS — Z78.9 SELF-CATHETERIZES URINARY BLADDER: ICD-10-CM

## 2022-07-26 DIAGNOSIS — R32 URINARY INCONTINENCE, UNSPECIFIED TYPE: ICD-10-CM

## 2022-07-26 DIAGNOSIS — G35 MULTIPLE SCLEROSIS (H): Primary | ICD-10-CM

## 2022-07-26 DIAGNOSIS — N31.9 NEUROGENIC BLADDER: ICD-10-CM

## 2022-07-26 PROCEDURE — 99213 OFFICE O/P EST LOW 20 MIN: CPT | Mod: 95 | Performed by: FAMILY MEDICINE

## 2022-07-26 ASSESSMENT — ANXIETY QUESTIONNAIRES
2. NOT BEING ABLE TO STOP OR CONTROL WORRYING: NOT AT ALL
6. BECOMING EASILY ANNOYED OR IRRITABLE: NOT AT ALL
5. BEING SO RESTLESS THAT IT IS HARD TO SIT STILL: NOT AT ALL
7. FEELING AFRAID AS IF SOMETHING AWFUL MIGHT HAPPEN: NOT AT ALL
GAD7 TOTAL SCORE: 1
1. FEELING NERVOUS, ANXIOUS, OR ON EDGE: SEVERAL DAYS
GAD7 TOTAL SCORE: 1
IF YOU CHECKED OFF ANY PROBLEMS ON THIS QUESTIONNAIRE, HOW DIFFICULT HAVE THESE PROBLEMS MADE IT FOR YOU TO DO YOUR WORK, TAKE CARE OF THINGS AT HOME, OR GET ALONG WITH OTHER PEOPLE: NOT DIFFICULT AT ALL
3. WORRYING TOO MUCH ABOUT DIFFERENT THINGS: NOT AT ALL

## 2022-07-26 ASSESSMENT — PATIENT HEALTH QUESTIONNAIRE - PHQ9
5. POOR APPETITE OR OVEREATING: NOT AT ALL
SUM OF ALL RESPONSES TO PHQ QUESTIONS 1-9: 7

## 2022-07-26 NOTE — PATIENT INSTRUCTIONS
Face to face visit was done for DME of catheter supplies.          Thank you for choosing [facility].  You may be receiving an email and/or telephone survey request from [facility] Customer Experience regarding your visit today.  Please take a few minutes to respond to the survey to let us know how we are doing.      If you have questions or concerns, please contact us via [employer] or you can contact your care team at [phone] option [phone].    Our Clinic hours are:  Monday - Thursday 7am-6pm  Friday 7am-5pm    The [place] outpatient lab hours are:  Monday - Friday 7am-4:30pm    Appointments are required, call [phone]    If you have clinical questions after hours or would like to schedule an appointment,  call the clinic at [phone].

## 2022-07-26 NOTE — PROGRESS NOTES
Tonya is a 67 year old who is being evaluated via a billable video visit.      How would you like to obtain your AVS? MyChart  If the video visit is dropped, the invitation should be resent by: Text to cell phone: [phone]  Will anyone else be joining your video visit? No        Assessment & Plan     Multiple sclerosis (H)  Patient does self catheterization due to MS, neurogenic bladder etc.  She does it about 8 times per day.  I went through the order for the DME.  No complications of this.  Documented  face to face visit is done today for the DME  - Catheterization Supplies Order for DME - ONLY FOR DME    Urinary incontinence, unspecified type    - Catheterization Supplies Order for DME - ONLY FOR DME    Self-catheterizes urinary bladder    - Catheterization Supplies Order for DME - ONLY FOR DME    Neurogenic bladder    - Catheterization Supplies Order for DME - ONLY FOR DME                 Return in about 4 weeks (around 8/23/2022) for If not better.    Robb Cabrera MD  [facility]    Subjective   Tonya is a 67 year old accompanied by her self , presenting for the following health issues:  Chief Complaint   Patient presents with     Orders     Needs Order for DME : Needs [employer] Orders for Her Catheters , Order needs to be printed and faxed . [employer] Phone # [phone], Fax # [phone]      Health Maintenance     Due for colon screen, dexa scan            HPI   Orders Needs Order for DME : Needs Up[employer] Orders for Her Catheters , Order needs to be printed and faxed . [employer] Phone # [phone], Fax # [phone]                 Review of Systems         Objective           Vitals:  No vitals were obtained today due to virtual visit.    Physical Exam   GENERAL: Healthy, alert and no distress  EYES: Eyes grossly normal to inspection.  No discharge or erythema, or obvious scleral/conjunctival abnormalities.  RESP: No audible wheeze, cough, or visible cyanosis.   No visible retractions or increased work of breathing.    SKIN: Visible skin clear. No significant rash, abnormal pigmentation or lesions.  NEURO: Cranial nerves grossly intact.  Mentation and speech appropriate for age.  PSYCH: Mentation appears normal, affect normal/bright, judgement and insight intact, normal speech and appearance well-groomed.                Video-Visit Details    Video Start Time: 1327    Type of service:  Video Visit    Video End Time:1334    Originating Location (pt. Location): Home    Distant Location (provider location):  [facility]     Platform used for Video Visit: Roc2Loc    .  ..

## 2022-07-26 NOTE — NURSING NOTE
Printed and faxed order for DME for Cath supplies to  at [phone]. Confirmed sent . - Curtis RAMEY CMA

## 2022-09-27 ENCOUNTER — TRANSFERRED RECORDS (OUTPATIENT)
Dept: FAMILY MEDICINE | Facility: CLINIC | Age: 67
End: 2022-09-27

## 2022-10-05 ENCOUNTER — TELEPHONE (OUTPATIENT)
Dept: FAMILY MEDICINE | Facility: CLINIC | Age: 67
End: 2022-10-05

## 2022-10-05 NOTE — TELEPHONE ENCOUNTER
Patient Quality Outreach    Patient is due for the following:   Colon Cancer Screening    Next Steps:   No follow up needed at this time.    Type of outreach:    Sent letter.      Questions for provider review:    None     Pushpa Knapp, CMA

## 2022-10-05 NOTE — LETTER
October 5, 2022      Tonya RICHARDSON Michael  [address] [facility] [address]      Your healthcare team cares about your health. To provide you with the best care,   we have reviewed your chart and based on our findings, we see that you are due to:     - COLON CANCER SCREENING:  Call or mychart the clinic to schedule your colonoscopy or schedule/ your FIT Test, or Cologuard test    If you have already completed these items, please contact the clinic via phone or   Mychart so your care team can review and update your records. Thank you for   choosing [facility] for your healthcare needs. For any questions,   concerns, or to schedule an appointment please contact the clinic.       Healthy Regards,      Your [facility] Care Team

## 2022-10-23 ENCOUNTER — HEALTH MAINTENANCE LETTER (OUTPATIENT)
Age: 67
End: 2022-10-23

## 2022-10-29 DIAGNOSIS — E78.2 MIXED HYPERLIPIDEMIA: ICD-10-CM

## 2022-10-29 DIAGNOSIS — G25.81 RESTLESS LEG: ICD-10-CM

## 2022-11-01 RX ORDER — SIMVASTATIN 20 MG
TABLET ORAL
Qty: 90 TABLET | Refills: 1 | Status: SHIPPED | OUTPATIENT
Start: 2022-11-01 | End: 2023-04-14

## 2022-11-01 RX ORDER — GABAPENTIN 100 MG/1
CAPSULE ORAL
Qty: 180 CAPSULE | Refills: 3 | OUTPATIENT
Start: 2022-11-01

## 2022-11-01 NOTE — TELEPHONE ENCOUNTER
Simvastatin Prescription approved per [facility] Refill Protocol     Cathie Suárez RN MSN

## 2022-11-01 NOTE — TELEPHONE ENCOUNTER
Gabapentin Routing to ordering provider for consideration, not on refill protocol.           Cathie Suárez     RN MSN

## 2023-01-20 DIAGNOSIS — R32 URINARY INCONTINENCE, UNSPECIFIED TYPE: ICD-10-CM

## 2023-01-20 DIAGNOSIS — G35 MULTIPLE SCLEROSIS (H): ICD-10-CM

## 2023-01-23 RX ORDER — IMIPRAMINE HCL 50 MG
TABLET ORAL
Qty: 90 TABLET | Refills: 1 | Status: SHIPPED | OUTPATIENT
Start: 2023-01-23 | End: 2023-05-15

## 2023-01-31 DIAGNOSIS — E05.90 HYPERTHYROIDISM: ICD-10-CM

## 2023-01-31 RX ORDER — METHIMAZOLE 10 MG/1
TABLET ORAL
Qty: 225 TABLET | Refills: 3 | Status: CANCELLED | OUTPATIENT
Start: 2023-01-31

## 2023-02-02 DIAGNOSIS — E05.00 GRAVES DISEASE: Primary | ICD-10-CM

## 2023-02-02 DIAGNOSIS — E05.90 HYPERTHYROIDISM: ICD-10-CM

## 2023-02-02 RX ORDER — METHIMAZOLE 10 MG/1
TABLET ORAL
Qty: 225 TABLET | Refills: 2 | Status: CANCELLED | OUTPATIENT
Start: 2023-02-02

## 2023-02-02 NOTE — TELEPHONE ENCOUNTER
Endocrine triage  Request for refill of methimazole.  Former [facility] patient, last TFTS 12/21.      To clinic staff:   Please call Tonya.  Confirm with her what dose of methimazole she has been taking, and let me know . Help her get scheduled with a new endocrine provider.    Tell her she needs labs now.  I have placed orders.      Thanks    Francia Isaac MD

## 2023-02-02 NOTE — TELEPHONE ENCOUNTER
"  methimazole (TAPAZOLE) 10 MG tablet     Last Written Prescription Date:  3/7/22  Last Fill Quantity: 225,   # refills: 3  Last Office Visit : 1/31/22 [place]  Future Office visit:  Recommend future care with Dr Jimenez in [place] or Dr Isaac at the [facility].     Routing refill request to provider for review/approval because:  Methimazole not on the Endocrine [facility], [facility] or [facility] refill protocol/ former [place] patient    Last TSH, T3 and T4 12/24/21   Scheduling has been notified to contact the pt for appointment.\" Recommend future care with Dr Tony espinosa [place] or Dr Isaac at the [facility]. \"      "

## 2023-02-02 NOTE — TELEPHONE ENCOUNTER
Tonya is taking 25 mg Methimazole daily    (2.5 tablets of 10 mg tablets)     Has follow-up appointment scheduled for October.   Advised to get labs done.     Taya ANTHONY RN  [facility]

## 2023-02-06 ENCOUNTER — LAB (OUTPATIENT)
Dept: LAB | Facility: CLINIC | Age: 68
End: 2023-02-06
Payer: COMMERCIAL

## 2023-02-06 DIAGNOSIS — E05.00 GRAVES DISEASE: ICD-10-CM

## 2023-02-06 DIAGNOSIS — E78.2 MIXED HYPERLIPIDEMIA: ICD-10-CM

## 2023-02-06 LAB
CHOLEST SERPL-MCNC: 193 MG/DL
HDLC SERPL-MCNC: 57 MG/DL
LDLC SERPL CALC-MCNC: 115 MG/DL
NONHDLC SERPL-MCNC: 136 MG/DL
T3 SERPL-MCNC: 121 NG/DL (ref 85–202)
T4 FREE SERPL-MCNC: 0.47 NG/DL (ref 0.9–1.7)
TRIGL SERPL-MCNC: 105 MG/DL
TSH SERPL DL<=0.005 MIU/L-ACNC: 27.54 UIU/ML (ref 0.3–4.2)

## 2023-02-06 PROCEDURE — 80061 LIPID PANEL: CPT

## 2023-02-06 PROCEDURE — 84443 ASSAY THYROID STIM HORMONE: CPT

## 2023-02-06 PROCEDURE — 84480 ASSAY TRIIODOTHYRONINE (T3): CPT

## 2023-02-06 PROCEDURE — 36415 COLL VENOUS BLD VENIPUNCTURE: CPT

## 2023-02-06 PROCEDURE — 84439 ASSAY OF FREE THYROXINE: CPT

## 2023-02-06 NOTE — TELEPHONE ENCOUNTER
"Lab appointment scheduled for today  Patient is feeling \"just fine\" on current dose of medication    Taya ANTHONY RN  [facility]     "

## 2023-02-07 DIAGNOSIS — E05.00 GRAVES DISEASE: Primary | ICD-10-CM

## 2023-02-07 RX ORDER — METHIMAZOLE 5 MG/1
5 TABLET ORAL DAILY
Qty: 30 TABLET | Refills: 3 | Status: SHIPPED | OUTPATIENT
Start: 2023-02-07 | End: 2024-04-19

## 2023-03-02 NOTE — TELEPHONE ENCOUNTER
RECORDS RECEIVED FROM: internal    DATE RECEIVED: 4/17/23   NOTES (FOR ALL VISITS) STATUS DETAILS   OFFICE NOTES from referring provider internal  Robb Cabrera MD   OFFICE NOTES from other specialist internal    1.31.22 [place]  12.24.21 Rick      MEDICATION LIST internal       LABS     DIABETES: HBGA1C, CREATININE, FASTING LIPIDS, MICROALBUMIN URINE, POTASSIUM, TSH, T4    THYROID: TSH, T4, CBC, THYRODLONULIN, TOTAL T3, FREE T4, CALCITONIN, CEA internal  Lipid- 2.6.23  TSH/T4/T3- 2.6.23  Glucose- 12.24.21  ALT- 12.24.21

## 2023-04-06 NOTE — PROGRESS NOTES
In person visit    HPI  8/4/2021, in person consultation  12/8/2021, in person visit  6/8/2022, in person visit  4/7/2023, in person visit      67-year-old being followed neurologically for:  Secondary progressive MS  Diagnosis in the late 1980s  Previously treated with Copaxone  Now off all immunosuppressive therapy times a couple of years      Since last seen  No hospitalizations  No surgeries    Has moved into a single level living  Does use the 4 wheeled walker with hand break  Has raised toilet seat in a bar to get up  Has a shower chair  Does not go out much uses the walker indoors does have a motorized scooter    Was off of thyroid meds for a bit and  Had labs as below primary readjusted thyroid medication  Laboratory data shows elevated TSH  2/6/2023,   TSH                      27.54    May fall maybe 2 times per year her left leg seems to stick even with a walker  She does try to be careful  Rediscussed safety            A.  Secondary progressive MS       Original diagnosis of MS late 1980s, presented with gait difficulties falling bladder problems.       Original work-up by Dr. Goins with lumbar puncture positive for MS       Treated with Copaxone in the past had some injection site irritation.       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 Luis Antonio Bray, recommended a trial of Cytoxan patient chose not to pursue that        Followed by Dr. Mukherjee from [phone]        Considered Tecfidera in 2015.         now off all immunosuppressive therapies for couple of years      Patient passed her  swallow study in August 2021  Still need to be careful  Lives on 1 level now  In past staircase had motorized lift seat that she used ( moved )  Has a walk-in shower with hand-held sprayer,grab bar  milaelvia high , with rails  No longer drives  As a hospital bed so that she can sit up in bed and do some crocheting      Did get a bed that elevates so that it is easier for her to sleep at night  Uses oxybutynin at nighttime so she does not have to get up to urinate frequently did talk about bedside commode in the future if necessary  Significant urinary difficulties follows with urology is supposed to self cath every 3-4 hours    Has a motorized scooter to get around inside the home    When she goes to appointments though this is too heavy to transport to visits such as medical visits    Will write a prescription for medical durable equipment  Lightweight collapsible wheelchair self propel  For use to get to medical appointments  Diagnosis multiple sclerosis  Severe ataxia high risk for falls and injury        B.  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  Has had hyperthyroidism in the past with proptosis  Has difficulty with swallowing coughs when she eats certain foods such as lettuce or meats that are hard to chew do not go down as well  Significant ataxia with left-sided weakness and clumsiness greater than right  Has a lift on the staircase  Significant urinary difficulties follows with urology is supposed to self cath every 3-4 hours  No longer drives  At home lives with her   Staircase has a motorized lift seat that she uses  As a walk-in shower with a shower bar  Has a hand-held sprayer  Uses a 4 wheeled walker with seat and hand  "hernan  Does have a powered motorized scooter but it is too heavy to transport outside the home for visits  Question whether they should switch out the seat in the shower so that she can use it as she thinks it is too small  Does follow with urology for her urinary retention and self caths about every 4 hours  Poor vision which also affects balance is going to be seeing the eye doctor shortly        Neurologic history summary  Diagnosed with multiple sclerosis in the late .  Presented with difficulty with gait falling down and bladder problems.  Work-up by Dr. Goins with lumbar puncture positive for MS  Treated with Copaxone in the past had some injection site irritation.  In  had reports of some memory difficulties  Has had significant bladder control problems  Trouble with dysarthria and speech  Also had significant difficulty with fatigue  Has been totally disabled since   Around  seen at the [facility] Dr. Luis Antonio Bray, recommended a trial of Cytoxan patient chose not to pursue that  Followed by Dr. Mukherjee from [id number]  Consider Tecfidera in       Past medical history  Multiple sclerosis onset   Left eye herpetic infection with visual loss around   Urinary incontinence  Insomnia  Hyperlipidemia  Hyper thyroidism/proptosis  Restless leg syndrome  Depression  Low back pain      Habits  History of smoking (has cut down to half a pack per day which \"is good for her\")  Does not use alcohol  Totally disabled since   Past driving restrictions, no freeway driving, no rush hour driving last evaluated 2017  No longer drives      Family history  Father with lung cancer and MI  at age 66  Mother with MI and high blood pressure  at age 73  Brother  in motor vehicle accident  Sister with cervical cancer      Work-up  MRI scan brain 2005 increasing plaque load compared to 1999 no active plaques  MRI brain 2007 moderate white matter changes stable " compared to 2005  MRI brain November 2014, multiple new chronic lesions compared to previous scan no active lesions seen (compared to 2010)  MRI scan brain November 2015  A.  Diffuse volume loss and cerebral white matter changes consistent with multiple sclerosis  B.  No active lesions seen on contrast scan  Swallow study 8/13/2021  No aspiration    MoCA  8/4/2021,       25 out of 30    Laboratory data review                               2/2023  HDL/LDL              57/115    TSH                      27.54    Exam    Review of system  Pertinent positives and negatives    No headache no chest pain no shortness of breath no nausea vomiting no diarrhea no fever chills    Does have diplopia  Blurry vision worse on the left than the right chronic  Dysarthric speech  Dysphagia coughs when eating  Clumsy limbs worse on the left and worse in the legs and the arms  Poor gait ataxic uses a walker  Urinary incontinence and retention self caths    Otherwise review systems negative    General exam  Blood pressure 109/76, pulse 77  HEENT significant for proptosis  Lungs decreased breath sounds bilaterally history of smoker possible COPD no wheezing  Heart rate regular  Abdomen soft positive bowel sounds  Symmetrical pulses  No edema the feet    Neurologic exam  Alert oriented x3  Prosody of speech dysarthric halting pattern  Normal naming  Normal comprehension  Normal repetition  No aphasia  No neglect    Formal [place] memory testing 25 out of 30 in the past    Knows some current events knows the president and the         Cranial nerves II through XII significant for  Unreactive left pupil  Ophthalmoplegia with poor eye saccades (also has proptosis)  Blurry vision worse in the left than the eye with monocular testing  No discrete visual field cut noted  Dysarthric speech tongue twisters thick  Face relatively symmetrical though  Dysarthric speech  Unreactive left pupil optic pallor left greater than right  Dysmetria  bilaterally        Upper extremities  Clumsiness worse on the left than the right  Dysmetria with finger-nose  No specific drift    Lower extremities  Weakness and slowness of rapid altering movements worse on the left compared to the right  Left iliopsoas slightly weaker than the right  Increased tone in the left lower extremity compared to the right but present bilaterally    Clumsy dysmetria bilateral lower extremities worse on the left than the right    Reflexes  Brisk in the upper extremities  Brisk at the knees with a little bit of overflow  Absent at the ankles  Toes equivocal  Negative Azevedo reflex    Gait  Needs both hands to stand up  With the four-wheel walker with hand brake has a spastic ataxic gait  Drags her left leg more so than the right  Turns are slow            Assessment/plan    1.  Secondary progressive MS onset late 1980s with progressive MRI scan changes as above      2.  History of left eye herpes infection with visual loss/hyperthyroidism with proptosis    3.  Dysarthria/visual changes/gait difficulties paresthesias/bladder difficulties/mood difficulties/memory difficulties, as part of her MS and medical issues    4.  Urinary dysfunction from MS follows with urology self caths every 4 to every 3 hours dexterity in hands enough to do the self cath still    5.  Decreased breath sounds history of continuing to smoke COPD discussed with patient cutting down or quitting smoking        Diagnosis  Secondary progressive MS  No longer on immune modulating medications  Originally diagnosed in the late 1980s    Plan  In the future if having problems could consider:    Formal physical therapy eval to see if we can maximize gait safety  Formal video swallow with speech path to see if we can adjust diet to avoid pneumonias  Continue to follow with urology with instructions for self cathing every 3-every 4 hours    Current medications filled by primary MD     Neurontin 100 mg tab, 2 tabs nightly      Imipramine 50 mg tab, 1 tab nightly     Oxybutynin ER 15 mg tab, 2 tabs nightly     Trazodone 50 mg nightly    See formal med list for other meds    Reviewed gait safety    As part of the visit today  Reviewed laboratory data  Reviewed EMR notes  Reviewed gait safety and living situation    Follow-up in 1 year    Total care time today 32 minutes predominantly discussion and counseling.

## 2023-04-07 ENCOUNTER — OFFICE VISIT (OUTPATIENT)
Dept: NEUROLOGY | Facility: CLINIC | Age: 68
End: 2023-04-07
Payer: COMMERCIAL

## 2023-04-07 VITALS
DIASTOLIC BLOOD PRESSURE: 76 MMHG | HEIGHT: 69 IN | WEIGHT: 150 LBS | SYSTOLIC BLOOD PRESSURE: 109 MMHG | BODY MASS INDEX: 22.22 KG/M2 | HEART RATE: 77 BPM

## 2023-04-07 DIAGNOSIS — G35 MULTIPLE SCLEROSIS (H): Primary | ICD-10-CM

## 2023-04-07 DIAGNOSIS — G25.81 RESTLESS LEG: ICD-10-CM

## 2023-04-07 PROCEDURE — 99214 OFFICE O/P EST MOD 30 MIN: CPT | Performed by: PSYCHIATRY & NEUROLOGY

## 2023-04-07 NOTE — LETTER
4/7/2023         RE: Tonya Rodriguez  [id number]  [facility] [address]        Dear Colleague,    Thank you for referring your patient, Tonya Rodriguez, to the [facility] [place]. Please see a copy of my visit note below.    In person visit    HPI  8/4/2021, in person consultation  12/8/2021, in person visit  6/8/2022, in person visit  4/7/2023, in person visit      67-year-old being followed neurologically for:  Secondary progressive MS  Diagnosis in the late 1980s  Previously treated with Copaxone  Now off all immunosuppressive therapy times a couple of years      Since last seen  No hospitalizations  No surgeries    Has moved into a single level living  Does use the 4 wheeled walker with hand break  Has raised toilet seat in a bar to get up  Has a shower chair  Does not go out much uses the walker indoors does have a motorized scooter    Was off of thyroid meds for a bit and  Had labs as below primary readjusted thyroid medication  Laboratory data shows elevated TSH  2/6/2023,   TSH                      27.54    May fall maybe 2 times per year her left leg seems to stick even with a walker  She does try to be careful  Rediscussed safety            A.  Secondary progressive MS       Original diagnosis of MS late 1980s, presented with gait difficulties falling bladder problems.       Original work-up by Dr. Goins with lumbar puncture positive for MS       Treated with Copaxone in the past had some injection site irritation.       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 Luis Antonio Bray, recommended a trial of Cytoxan patient chose not to pursue that        Followed by Dr. Mukherjee from [phone]        Considered Tecfidera in 2015.         now off all immunosuppressive therapies for couple of years      Patient passed her swallow study in August 2021  Still need to be careful  Lives on 1 level now  In past staircase had motorized lift seat that she used ( moved )  Has a walk-in shower with hand-held sprayer,grab bar  toliet high , with rails  No longer drives  As a hospital bed so that she can sit up in bed and do some crocheting      Did get a bed that elevates so that it is easier for her to sleep at night  Uses oxybutynin at nighttime so she does not have to get up to urinate frequently did talk about bedside commode in the future if necessary  Significant urinary difficulties follows with urology is supposed to self cath every 3-4 hours    Has a motorized scooter to get around inside the home    When she goes to appointments though this is too heavy to transport to visits such as medical visits    Will write a prescription for medical durable equipment  Lightweight collapsible wheelchair self propel  For use to get to medical appointments  Diagnosis multiple sclerosis  Severe ataxia high risk for falls and injury        B.  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  Has had hyperthyroidism in the past with proptosis  Has difficulty with swallowing coughs when she eats certain foods such as lettuce or meats that are hard to chew do not go down as well  Significant ataxia with left-sided weakness and clumsiness greater than right  Has a lift on the staircase  Significant urinary difficulties follows with  "urology is supposed to self cath every 3-4 hours  No longer drives  At home lives with her   Stabrigido has a motorized lift seat that she uses  As a walk-in shower with a shower bar  Has a hand-held sprayer  Uses a 4 wheeled walker with seat and hand brakes  Does have a powered motorized scooter but it is too heavy to transport outside the home for visits  Question whether they should switch out the seat in the shower so that she can use it as she thinks it is too small  Does follow with urology for her urinary retention and self caths about every 4 hours  Poor vision which also affects balance is going to be seeing the eye doctor shortly        Neurologic history summary  Diagnosed with multiple sclerosis in the late .  Presented with difficulty with gait falling down and bladder problems.  Work-up by Dr. Goins with lumbar puncture positive for MS  Treated with Copaxone in the past had some injection site irritation.  In  had reports of some memory difficulties  Has had significant bladder control problems  Trouble with dysarthria and speech  Also had significant difficulty with fatigue  Has been totally disabled since   Around  seen at the [facility] Dr. Luis Antonio Bray, recommended a trial of Cytoxan patient chose not to pursue that  Followed by Dr. Mukherjee from [id number]  Consider Tecfidera in       Past medical history  Multiple sclerosis onset   Left eye herpetic infection with visual loss around   Urinary incontinence  Insomnia  Hyperlipidemia  Hyper thyroidism/proptosis  Restless leg syndrome  Depression  Low back pain      Habits  History of smoking (has cut down to half a pack per day which \"is good for her\")  Does not use alcohol  Totally disabled since   Past driving restrictions, no freeway driving, no rush hour driving last evaluated 2017  No longer drives      Family history  Father with lung cancer and MI  at age 66  Mother with MI and high blood " pressure  at age 73  Brother  in motor vehicle accident  Sister with cervical cancer      Work-up  MRI scan brain 2005 increasing plaque load compared to 1999 no active plaques  MRI brain 2007 moderate white matter changes stable compared to   MRI brain 2014, multiple new chronic lesions compared to previous scan no active lesions seen (compared to )  MRI scan brain 2015  A.  Diffuse volume loss and cerebral white matter changes consistent with multiple sclerosis  B.  No active lesions seen on contrast scan  Swallow study 2021  No aspiration    MoCA  2021,       25 out of 30    Laboratory data review                               2023  HDL/LDL              57/115    TSH                      27.54    Exam    Review of system  Pertinent positives and negatives    No headache no chest pain no shortness of breath no nausea vomiting no diarrhea no fever chills    Does have diplopia  Blurry vision worse on the left than the right chronic  Dysarthric speech  Dysphagia coughs when eating  Clumsy limbs worse on the left and worse in the legs and the arms  Poor gait ataxic uses a walker  Urinary incontinence and retention self caths    Otherwise review systems negative    General exam  Blood pressure 109/76, pulse 77  HEENT significant for proptosis  Lungs decreased breath sounds bilaterally history of smoker possible COPD no wheezing  Heart rate regular  Abdomen soft positive bowel sounds  Symmetrical pulses  No edema the feet    Neurologic exam  Alert oriented x3  Prosody of speech dysarthric halting pattern  Normal naming  Normal comprehension  Normal repetition  No aphasia  No neglect    Formal Alva memory testing 25 out of 30 in the past    Knows some current events knows the president and the         Cranial nerves II through XII significant for  Unreactive left pupil  Ophthalmoplegia with poor eye saccades (also has proptosis)  Blurry  vision worse in the left than the eye with monocular testing  No discrete visual field cut noted  Dysarthric speech tongue twisters thick  Face relatively symmetrical though  Dysarthric speech  Unreactive left pupil optic pallor left greater than right  Dysmetria bilaterally        Upper extremities  Clumsiness worse on the left than the right  Dysmetria with finger-nose  No specific drift    Lower extremities  Weakness and slowness of rapid altering movements worse on the left compared to the right  Left iliopsoas slightly weaker than the right  Increased tone in the left lower extremity compared to the right but present bilaterally    Clumsy dysmetria bilateral lower extremities worse on the left than the right    Reflexes  Brisk in the upper extremities  Brisk at the knees with a little bit of overflow  Absent at the ankles  Toes equivocal  Negative Azevedo reflex    Gait  Needs both hands to stand up  With the four-wheel walker with hand brake has a spastic ataxic gait  Drags her left leg more so than the right  Turns are slow            Assessment/plan    1.  Secondary progressive MS onset late 1980s with progressive MRI scan changes as above      2.  History of left eye herpes infection with visual loss/hyperthyroidism with proptosis    3.  Dysarthria/visual changes/gait difficulties paresthesias/bladder difficulties/mood difficulties/memory difficulties, as part of her MS and medical issues    4.  Urinary dysfunction from MS follows with urology self caths every 4 to every 3 hours dexterity in hands enough to do the self cath still    5.  Decreased breath sounds history of continuing to smoke COPD discussed with patient cutting down or quitting smoking        Diagnosis  Secondary progressive MS  No longer on immune modulating medications  Originally diagnosed in the late 1980s    Plan  In the future if having problems could consider:    Formal physical therapy eval to see if we can maximize gait  safety  Formal video swallow with speech path to see if we can adjust diet to avoid pneumonias  Continue to follow with urology with instructions for self cathing every 3-every 4 hours    Current medications filled by primary MD     Neurontin 100 mg tab, 2 tabs nightly     Imipramine 50 mg tab, 1 tab nightly     Oxybutynin ER 15 mg tab, 2 tabs nightly     Trazodone 50 mg nightly    See formal med list for other meds    Reviewed gait safety    As part of the visit today  Reviewed laboratory data  Reviewed EMR notes  Reviewed gait safety and living situation    Follow-up in 1 year    Total care time today 32 minutes predominantly discussion and counseling.      Again, thank you for allowing me to participate in the care of your patient.        Sincerely,        Devendra Tabares MD

## 2023-04-07 NOTE — NURSING NOTE
Chief Complaint   Patient presents with     MS     10 month follow up. Pt states she is doing well, feels things are stable.     Edith Em LPN on 4/7/2023 at 12:35 PM

## 2023-04-14 ENCOUNTER — TELEPHONE (OUTPATIENT)
Dept: FAMILY MEDICINE | Facility: CLINIC | Age: 68
End: 2023-04-14
Payer: COMMERCIAL

## 2023-04-14 NOTE — LETTER
April 14, 2023      Tonya Rodriguez  [address] [facility] [address]        To Whom It May Concern,     April 14, 2023    To  Tonya Rodriguez  [address] [place] [address]    Your team at [facility] cares about your health. We have reviewed your chart and based on our findings; we are making the following recommendations to better manage your health.     You are in particular need of attention regarding the following:     Call or MyChart message your clinic to schedule a colonoscopy, schedule/ a FIT Test, or order a Cologuard test. If you are unsure what type of test you need, please call your clinic and speak to clinic staff.   Colon cancer is now the second leading cause of cancer-related deaths in the [place] for both men and women and there are over 130,000 new cases and 50,000 deaths per year from colon cancer. Colonoscopies can prevent 90-95% of these deaths. Problem lesions can be removed before they ever become cancer. This test is not only looking for cancer, but also getting rid of precancerous lesions.   Please call [phone] to schedule a colonoscopy.  Contact your clinic to schedule/ your FIT Test.   Schedule an office visit with your provider if you are interested in completing your colon cancer screening with a Cologuard test    If you have already completed these items, please contact the clinic via phone or   [employer] so your care team can review and update your records. Thank you for   choosing [facility] for your healthcare needs. For any questions,   concerns, or to schedule an appointment please contact our clinic.    Healthy Regards,      Your [facility] Care Team          Sincerely,        Robb Cabrera MD

## 2023-04-17 ENCOUNTER — VIRTUAL VISIT (OUTPATIENT)
Dept: ENDOCRINOLOGY | Facility: CLINIC | Age: 68
End: 2023-04-17
Payer: COMMERCIAL

## 2023-04-17 ENCOUNTER — PRE VISIT (OUTPATIENT)
Dept: ENDOCRINOLOGY | Facility: CLINIC | Age: 68
End: 2023-04-17

## 2023-04-17 DIAGNOSIS — E05.00 GRAVES DISEASE: Primary | ICD-10-CM

## 2023-04-17 PROCEDURE — 99215 OFFICE O/P EST HI 40 MIN: CPT | Mod: VID | Performed by: STUDENT IN AN ORGANIZED HEALTH CARE EDUCATION/TRAINING PROGRAM

## 2023-04-17 NOTE — LETTER
4/17/2023       RE: Tonya Rodriguez  [id number]  [facility] [address]     Dear Colleague,    Thank you for referring your patient, Tonya Rodriguez, to the [facility]CR[facility] [place] at [facility]. Please see a copy of my visit note below.    Endocrinology Clinic Visit 4/17/2023      Video-Visit Details    Type of service:  Video Visit    Joined the call at 4/17/2023, 3:41:28 pm.  Left the call at 4/17/2023, 4:00:22 pm.    Originating Location (pt. Location): Home        Distant Location (provider location):  Off-site    Mode of Communication:  Video Conference via [employer]    Physician has received verbal consent for a Video Visit from the patient? Yes    I spent a total of 45 minutes on the date of encounter reviewing medical records, evaluating the patient, coordinating care and documenting in the EHR, as detailed above.      NAME:  Tonya Rodriguez  PCP:  Robb Cabrera  MRN:  [id number]  Reason for Consult:  Graves disease  Requesting Provider:  No ref. provider found    Chief Complaint     Chief Complaint   Patient presents with    Video Visit     Graves Disease       History of Present Illness     Tonya Rodriguez is a 67 year old female who is seen in video visit for graves disease. She was seeing dr. Figueroa, last seen 1/2022.      She has  PMH significant for MS.   In 2018 she had progressive symptoms included weight loss ~35#, insomnia, decreased appetite, palpitation. She was diagnosed with hyperthyroidism and started on MMI since then. She had elevated TSI on 1/2019 and 2/2019. She was taking 25 mg of methimazole daily and 25 mg of metoprolol daily.        on 1/2022 she saw Dr. Figueroa, continues methimazole 25 mg every day. I reviewed all her TFT in records. Most recent ones signifiant for:     Latest Reference Range & Units 12/24/21 11:19 02/06/23 14:09   T4 Free 0.90 - 1.70 ng/dL 1.06 0.47 (L)   TSH 0.40 -  "4.00 mU/L 0.48    TSH 0.30 - 4.20 uIU/mL  27.54 (H)          There is a note from DR. Francis on 2/8/23 to call the patient and lubna her :\"Please hold (don't take) methimazole for one week, then restart at 5 mg/day.  Repeat labs one week. I am submitting new Rx for methimazole 5 mg dose\"    She said at first she is taking 25 mg daily then said 2 tablets of what is prescribed. I asked to go and check her pill box and her bottle. She confirmed she is taking 5 mg daily since mid February 2023.    She reported feeling well, no new symptoms. She has chronic tremor and chronic constipation unchanged. No change in weight. No palpitation.    She said her eye symptoms are all related to her MS and not to her thyroid. Last note from opht on 6/2019 reported history of BRIDGET. She denied any new sx or concerns today.      Problem List     Patient Active Problem List   Diagnosis    Multiple sclerosis (H)    Allergic rhinitis    Other chronic allergic conjunctivitis    Dysphagia    Hyperlipidemia with target LDL less than 130    S/P revision of total hip  RIGHT    Osteoarthritis of hip    Major depression in complete remission (H)    Urinary incontinence    Restless leg    Herpes keratitis    Advanced directives, counseling/discussion    Family history of rheumatoid arthritis    Speech dysfluency    Self-catheterizes urinary bladder    Insomnia, unspecified type    Graves disease    Smoker    Screening for cervical cancer    Raynaud's syndrome without gangrene     Major depression in complete remission (H)    Neurogenic bladder        Medications     Current Outpatient Medications   Medication    gabapentin (NEURONTIN) 100 MG capsule    imipramine (TOFRANIL) 50 MG tablet    methimazole (TAPAZOLE) 5 MG tablet    metoprolol succinate ER (TOPROL XL) 25 MG 24 hr tablet    NIFEdipine ER (ADALAT CC) 30 MG 24 hr tablet    order for DME    oxybutynin ER (DITROPAN XL) 15 MG 24 hr tablet    simvastatin (ZOCOR) 20 MG tablet    traZODone " (DESYREL) 50 MG tablet    valACYclovir (VALTREX) 500 MG tablet    prednisoLONE acetate (PRED FORTE) 1 % ophthalmic suspension     No current facility-administered medications for this visit.        Allergies     Allergies   Allergen Reactions    Cipro [Ciprofloxacin] Rash     gets yeast infections - BAD with.    Lactulose GI Disturbance     Caused Vomiting       Medical / Surgical History     Past Medical History:   Diagnosis Date    Chest pain, unspecified 3/22/04    10/10 initially inseverity - responded to Aspirin and two doses of Nitroglycerin sublingual     Herpes simplex with other ophthalmic complications     on valtrex    Lipoma of unspecified site     Lower left suprascapular    Migraine, unspecified, with intractable migraine, so stated, without mention of status migrainosus 1/20/2007    midrin - about 30 pill a year     Past Surgical History:   Procedure Laterality Date    APPENDECTOMY  1980's    Retained suture    ARTHROPLASTY HIP  9/19/2012    Procedure: ARTHROPLASTY HIP;  Right Total Hip Minimally Invasive Surgery;  Surgeon: Devendra Douglas MD;  Location: [facility] OR    ARTHROSCOPY KNEE RT/LT  1989    Arthroscopy of the Left Knee    BUNIONECTOMY RT/LT      Bilateral bunionectomies x4 surgeries    DILATION AND CURETTAGE, OPERATIVE HYSTEROSCOPY WITH MORCELLATOR, COMBINED N/A 5/15/2019    Procedure: DILATION AND CURETTAGE,Hysteroscopy;  Surgeon: Lauren Moise MD;  Location: [facility] OR    EXCISE LESION TRUNK N/A 4/17/2015    Procedure: EXCISE LESION TRUNK;  Surgeon: Wander Gutierrez MD;  Location: [facility] OR    SURGICAL HISTORY OF -   1974, 1977    two normal deliveries    TUBAL LIGATION  1981-82       Social History     Social History     Socioeconomic History    Marital status:      Spouse name: Not on file    Number of children: Not on file    Years of education: Not on file    Highest education level: Not on file   Occupational History    Not on file   Tobacco Use    Smoking status: Every Day      "Packs/day: 1.00     Years: 45.00     Pack years: 45.00     Types: Cigarettes    Smokeless tobacco: Never   Vaping Use    Vaping status: Never Used   Substance and Sexual Activity    Alcohol use: No    Drug use: No    Sexual activity: Not Currently     Partners: Male   Other Topics Concern    Parent/sibling w/ CABG, MI or angioplasty before 65F 55M? Not Asked     Service No    Blood Transfusions No    Caffeine Concern Yes     Comment: 3 cups and 1 can    Occupational Exposure No    Hobby Hazards No    Sleep Concern No    Stress Concern No    Weight Concern No    Special Diet No    Back Care Yes     Comment: hurt years ago Jr in High School, low back    Exercise Yes    Bike Helmet No    Seat Belt Yes    Self-Exams Not Asked   Social History Narrative    Not on file     Social Determinants of Health     Financial Resource Strain: Not on file   Food Insecurity: Not on file   Transportation Needs: Not on file   Physical Activity: Not on file   Stress: Not on file   Social Connections: Not on file   Intimate Partner Violence: Not on file   Housing Stability: Not on file       Family History     Family History   Problem Relation Age of Onset    Hypertension Mother     Heart Disease Mother     Migraines Mother     Cancer Father         lung    Heart Disease Father     Diabetes Father     Heart Disease Maternal Grandfather     Cancer Sister         cervical    Heart Disease Son         mummer    Alcohol/Drug Brother     Alcoholism Brother     Genitourinary Problems Brother         stones    Coronary Artery Disease Brother         injury \" maker\" tree fell on him    Glaucoma No family hx of     Macular Degeneration No family hx of     Aneurysm No family hx of     Brain Hemorrhage No family hx of     Seizure Disorder No family hx of     Cerebrovascular Disease No family hx of     Depression No family hx of        ROS     12 ROS completed, pertinent positive and negative in HPI    Physical Exam   There were no " vitals taken for this visit.   GENERAL: Healthy, alert and no distress  EYES: Eyes grossly normal to inspection.  No discharge or erythema, or obvious scleral/conjunctival abnormalities.  RESP: No audible wheeze, cough, or visible cyanosis.  No visible retractions or increased work of breathing.    SKIN: Visible skin clear. No significant rash, abnormal pigmentation or lesions.  NEURO: Cranial nerves grossly intact.  Mentation and speech appropriate for age.  PSYCH: Mentation appears normal, affect normal/bright, judgement and insight intact, normal speech and appearance well-groomed.     Labs/Imaging     Pertinent Labs were reviewed and updated in EPIC and discussed briefly.  Radiology Results were  reviewed and updated in EPIC and discussed briefly.    Summary of recent findings:   Lab Results   Component Value Date    A1C 6.0 04/12/2011       TSH   Date Value Ref Range Status   02/06/2023 27.54 (H) 0.30 - 4.20 uIU/mL Final   12/24/2021 0.48 0.40 - 4.00 mU/L Final   12/02/2019 2.11 0.40 - 4.00 mU/L Final   05/09/2019 <0.01 (L) 0.40 - 4.00 mU/L Final   02/13/2019 <0.01 (L) 0.40 - 4.00 mU/L Final   01/16/2019 <0.01 (L) 0.40 - 4.00 mU/L Final   10/05/2018 <0.01 (L) 0.40 - 4.00 mU/L Final     T4 Free   Date Value Ref Range Status   12/02/2019 0.80 0.76 - 1.46 ng/dL Final   05/09/2019 1.57 (H) 0.76 - 1.46 ng/dL Final   02/13/2019 1.85 (H) 0.76 - 1.46 ng/dL Final   01/16/2019 1.93 (H) 0.76 - 1.46 ng/dL Final   10/05/2018 3.97 (H) 0.76 - 1.46 ng/dL Final     Free T4   Date Value Ref Range Status   02/06/2023 0.47 (L) 0.90 - 1.70 ng/dL Final   12/24/2021 1.06 0.76 - 1.46 ng/dL Final       Creatinine   Date Value Ref Range Status   09/24/2019 0.76 0.52 - 1.04 mg/dL Final       Recent Labs   Lab Test 02/06/23  1409 12/24/21  1119 02/08/19  1141 02/06/15  0943   CHOL 193 231*   < > 183   HDL 57 57   < > 48*   * 155*   < > 113   TRIG 105 94   < > 108   CHOLHDLRATIO  --   --   --  3.8    < > = values in this interval  not displayed.       No results found for: [id number], [id number], [id number]    I personally reviewed the patient's outside records from [facility] EMR. Summary of pertinent findings in HPI.    Impression / Plan     1. Graves disease    Diagnosed in 2018, was on high dose MMI then lost follow up for 1 year. TFT on 2/2023 showed hypothyroidism. She is on MMI 5 mg since 2/2023.    - due for TFT, will adjust MMI accordingly.    Discussed Methimazole, medication used to treat graves disease is usually well tolerated and safe that it has a rare and serious side effects.  are and serious side effects are  1.  Agranulocytosis: this is a loss of the white cell count that fights an infection.  This occurs approximately 1 in 200 people  2. liver problems this is even more rare than a granulocytosis but it can be very serious     Given her BRIDGET and smoking RAIA was discussed in the past and MMI was chosen.        Test and/or medications prescribed today:  Orders Placed This Encounter   Procedures    TSH    T4 free         Follow up: 6 month      Marleen Acosta MD  Endocrinology, Diabetes and Metabolism  [facility]

## 2023-04-17 NOTE — PROGRESS NOTES
"Endocrinology Clinic Visit 4/17/2023      Video-Visit Details    Type of service:  Video Visit    Joined the call at 4/17/2023, 3:41:28 pm.  Left the call at 4/17/2023, 4:00:22 pm.    Originating Location (pt. Location): Home        Distant Location (provider location):  Off-site    Mode of Communication:  Video Conference via [facility]    Physician has received verbal consent for a Video Visit from the patient? Yes    I spent a total of 45 minutes on the date of encounter reviewing medical records, evaluating the patient, coordinating care and documenting in the EHR, as detailed above.      NAME:  Tonya Rodriguez  PCP:  Robb Cabrera  MRN:  [id number]  Reason for Consult:  Graves disease  Requesting Provider:  No ref. provider found    Chief Complaint     Chief Complaint   Patient presents with     Video Visit     Graves Disease       History of Present Illness     Tonya Rodriguez is a 67 year old female who is seen in video visit for graves disease. She was seeing dr. Figueroa, last seen 1/2022.      She has  PMH significant for MS.   In 2018 she had progressive symptoms included weight loss ~35#, insomnia, decreased appetite, palpitation. She was diagnosed with hyperthyroidism and started on MMI since then. She had elevated TSI on 1/2019 and 2/2019. She was taking 25 mg of methimazole daily and 25 mg of metoprolol daily.        on 1/2022 she saw Dr. Figueroa, continues methimazole 25 mg every day. I reviewed all her TFT in records. Most recent ones signifiant for:     Latest Reference Range & Units 12/24/21 11:19 02/06/23 14:09   T4 Free 0.90 - 1.70 ng/dL 1.06 0.47 (L)   TSH 0.40 - 4.00 mU/L 0.48    TSH 0.30 - 4.20 uIU/mL  27.54 (H)          There is a note from DR. Francis on 2/8/23 to call the patient and lubna her :\"Please hold (don't take) methimazole for one week, then restart at 5 mg/day.  Repeat labs one week. I am submitting new Rx for methimazole 5 mg dose\"    She said at first she is " taking 25 mg daily then said 2 tablets of what is prescribed. I asked to go and check her pill box and her bottle. She confirmed she is taking 5 mg daily since mid February 2023.    She reported feeling well, no new symptoms. She has chronic tremor and chronic constipation unchanged. No change in weight. No palpitation.    She said her eye symptoms are all related to her MS and not to her thyroid. Last note from opht on 6/2019 reported history of BRIDGET. She denied any new sx or concerns today.      Problem List     Patient Active Problem List   Diagnosis     Multiple sclerosis (H)     Allergic rhinitis     Other chronic allergic conjunctivitis     Dysphagia     Hyperlipidemia with target LDL less than 130     S/P revision of total hip  RIGHT     Osteoarthritis of hip     Major depression in complete remission (H)     Urinary incontinence     Restless leg     Herpes keratitis     Advanced directives, counseling/discussion     Family history of rheumatoid arthritis     Speech dysfluency     Self-catheterizes urinary bladder     Insomnia, unspecified type     Graves disease     Smoker     Screening for cervical cancer     Raynaud's syndrome without gangrene      Major depression in complete remission (H)     Neurogenic bladder        Medications     Current Outpatient Medications   Medication     gabapentin (NEURONTIN) 100 MG capsule     imipramine (TOFRANIL) 50 MG tablet     methimazole (TAPAZOLE) 5 MG tablet     metoprolol succinate ER (TOPROL XL) 25 MG 24 hr tablet     NIFEdipine ER (ADALAT CC) 30 MG 24 hr tablet     order for DME     oxybutynin ER (DITROPAN XL) 15 MG 24 hr tablet     simvastatin (ZOCOR) 20 MG tablet     traZODone (DESYREL) 50 MG tablet     valACYclovir (VALTREX) 500 MG tablet     prednisoLONE acetate (PRED FORTE) 1 % ophthalmic suspension     No current facility-administered medications for this visit.        Allergies     Allergies   Allergen Reactions     Cipro [Ciprofloxacin] Rash     gets yeast  infections - BAD with.     Lactulose GI Disturbance     Caused Vomiting       Medical / Surgical History     Past Medical History:   Diagnosis Date     Chest pain, unspecified 3/22/04    10/10 initially inseverity - responded to Aspirin and two doses of Nitroglycerin sublingual      Herpes simplex with other ophthalmic complications     on valtrex     Lipoma of unspecified site     Lower left suprascapular     Migraine, unspecified, with intractable migraine, so stated, without mention of status migrainosus 1/20/2007    midrin - about 30 pill a year     Past Surgical History:   Procedure Laterality Date     APPENDECTOMY  1980's    Retained suture     ARTHROPLASTY HIP  9/19/2012    Procedure: ARTHROPLASTY HIP;  Right Total Hip Minimally Invasive Surgery;  Surgeon: Devendra Douglas MD;  Location: [facility] OR     ARTHROSCOPY KNEE RT/LT  1989    Arthroscopy of the Left Knee     BUNIONECTOMY RT/LT      Bilateral bunionectomies x4 surgeries     DILATION AND CURETTAGE, OPERATIVE HYSTEROSCOPY WITH MORCELLATOR, COMBINED N/A 5/15/2019    Procedure: DILATION AND CURETTAGE,Hysteroscopy;  Surgeon: Lauren Moise MD;  Location: [facility] OR     EXCISE LESION TRUNK N/A 4/17/2015    Procedure: EXCISE LESION TRUNK;  Surgeon: Wander Gutierrez MD;  Location: [facility] OR     SURGICAL HISTORY OF -   1974, 1977    two normal deliveries     TUBAL LIGATION  1981-82       Social History     Social History     Socioeconomic History     Marital status:      Spouse name: Not on file     Number of children: Not on file     Years of education: Not on file     Highest education level: Not on file   Occupational History     Not on file   Tobacco Use     Smoking status: Every Day     Packs/day: 1.00     Years: 45.00     Pack years: 45.00     Types: Cigarettes     Smokeless tobacco: Never   Vaping Use     Vaping status: Never Used   Substance and Sexual Activity     Alcohol use: No     Drug use: No     Sexual activity: Not Currently     Partners:  "Male   Other Topics Concern     Parent/sibling w/ CABG, MI or angioplasty before 65F 55M? Not Asked      Service No     Blood Transfusions No     Caffeine Concern Yes     Comment: 3 cups and 1 can     Occupational Exposure No     Hobby Hazards No     Sleep Concern No     Stress Concern No     Weight Concern No     Special Diet No     Back Care Yes     Comment: hurt years ago Jr in High School, low back     Exercise Yes     Bike Helmet No     Seat Belt Yes     Self-Exams Not Asked   Social History Narrative     Not on file     Social Determinants of Health     Financial Resource Strain: Not on file   Food Insecurity: Not on file   Transportation Needs: Not on file   Physical Activity: Not on file   Stress: Not on file   Social Connections: Not on file   Intimate Partner Violence: Not on file   Housing Stability: Not on file       Family History     Family History   Problem Relation Age of Onset     Hypertension Mother      Heart Disease Mother      Migraines Mother      Cancer Father         lung     Heart Disease Father      Diabetes Father      Heart Disease Maternal Grandfather      Cancer Sister         cervical     Heart Disease Son         mummer     Alcohol/Drug Brother      Alcoholism Brother      Genitourinary Problems Brother         stones     Coronary Artery Disease Brother         injury \" maker\" tree fell on him     Glaucoma No family hx of      Macular Degeneration No family hx of      Aneurysm No family hx of      Brain Hemorrhage No family hx of      Seizure Disorder No family hx of      Cerebrovascular Disease No family hx of      Depression No family hx of        ROS     12 ROS completed, pertinent positive and negative in HPI    Physical Exam   There were no vitals taken for this visit.   GENERAL: Healthy, alert and no distress  EYES: Eyes grossly normal to inspection.  No discharge or erythema, or obvious scleral/conjunctival abnormalities.  RESP: No audible wheeze, cough, or visible " cyanosis.  No visible retractions or increased work of breathing.    SKIN: Visible skin clear. No significant rash, abnormal pigmentation or lesions.  NEURO: Cranial nerves grossly intact.  Mentation and speech appropriate for age.  PSYCH: Mentation appears normal, affect normal/bright, judgement and insight intact, normal speech and appearance well-groomed.     Labs/Imaging     Pertinent Labs were reviewed and updated in EPIC and discussed briefly.  Radiology Results were  reviewed and updated in EPIC and discussed briefly.    Summary of recent findings:   Lab Results   Component Value Date    A1C 6.0 04/12/2011       TSH   Date Value Ref Range Status   02/06/2023 27.54 (H) 0.30 - 4.20 uIU/mL Final   12/24/2021 0.48 0.40 - 4.00 mU/L Final   12/02/2019 2.11 0.40 - 4.00 mU/L Final   05/09/2019 <0.01 (L) 0.40 - 4.00 mU/L Final   02/13/2019 <0.01 (L) 0.40 - 4.00 mU/L Final   01/16/2019 <0.01 (L) 0.40 - 4.00 mU/L Final   10/05/2018 <0.01 (L) 0.40 - 4.00 mU/L Final     T4 Free   Date Value Ref Range Status   12/02/2019 0.80 0.76 - 1.46 ng/dL Final   05/09/2019 1.57 (H) 0.76 - 1.46 ng/dL Final   02/13/2019 1.85 (H) 0.76 - 1.46 ng/dL Final   01/16/2019 1.93 (H) 0.76 - 1.46 ng/dL Final   10/05/2018 3.97 (H) 0.76 - 1.46 ng/dL Final     Free T4   Date Value Ref Range Status   02/06/2023 0.47 (L) 0.90 - 1.70 ng/dL Final   12/24/2021 1.06 0.76 - 1.46 ng/dL Final       Creatinine   Date Value Ref Range Status   09/24/2019 0.76 0.52 - 1.04 mg/dL Final       Recent Labs   Lab Test 02/06/23  1409 12/24/21  1119 02/08/19  1141 02/06/15  0943   CHOL 193 231*   < > 183   HDL 57 57   < > 48*   * 155*   < > 113   TRIG 105 94   < > 108   CHOLHDLRATIO  --   --   --  3.8    < > = values in this interval not displayed.       No results found for: [id number], [id number], [id number]    I personally reviewed the patient's outside records from [facility] EMR. Summary of pertinent findings in HPI.    Impression / Plan     1. Graves  disease    Diagnosed in 2018, was on high dose MMI then lost follow up for 1 year. TFT on 2/2023 showed hypothyroidism. She is on MMI 5 mg since 2/2023.    - due for TFT, will adjust MMI accordingly.    Discussed Methimazole, medication used to treat graves disease is usually well tolerated and safe that it has a rare and serious side effects.  are and serious side effects are  1.  Agranulocytosis: this is a loss of the white cell count that fights an infection.  This occurs approximately 1 in 200 people  2. liver problems this is even more rare than a granulocytosis but it can be very serious     Given her BRIDGET and smoking RAIA was discussed in the past and MMI was chosen.        Test and/or medications prescribed today:  Orders Placed This Encounter   Procedures     TSH     T4 free         Follow up: 6 month      Marleen Acosta MD  Endocrinology, Diabetes and Metabolism  [facility]

## 2023-04-17 NOTE — NURSING NOTE
Is the patient currently in the state of [place]? YES    Visit mode:VIDEO    If the visit is dropped, the patient can be reconnected by: TELEPHONE VISIT: Phone number: [phone]    Will anyone else be joining the visit? NO    How would you like to obtain your AVS? MyChart    Are changes needed to the allergy or medication list? NO    Reason for visit:   Chief Complaint   Patient presents with     Video Visit     Graves Disease       Terrie Wilhelm MA

## 2023-04-17 NOTE — PATIENT INSTRUCTIONS
Please schedule a lab appointment.    Discussed Methimazole, medication used to treat graves disease is usually well tolerated and safe that it has a rare and serious side effects.  are and serious side effects are  1.  Agranulocytosis: this is a loss of the white cell count that fights an infection.  This occurs approximately 1 in 200 people  2. liver problems this is even more rare than a granulocytosis but it can be very serious     If you develop the following symptoms while taking methimazole please notify your doctor immediately: fever, signs of infection such as sore throat: Or flulike illness, nausea, vomiting, abdominal pain.  any time you have an infection please have your complete blood count checked to make sure that your white cell count is stable

## 2023-04-20 ENCOUNTER — PATIENT OUTREACH (OUTPATIENT)
Dept: CARE COORDINATION | Facility: CLINIC | Age: 68
End: 2023-04-20
Payer: COMMERCIAL

## 2023-04-23 DIAGNOSIS — R32 URINARY INCONTINENCE, UNSPECIFIED TYPE: ICD-10-CM

## 2023-04-24 DIAGNOSIS — G47.00 INSOMNIA, UNSPECIFIED TYPE: ICD-10-CM

## 2023-04-24 RX ORDER — OXYBUTYNIN CHLORIDE 15 MG/1
TABLET, EXTENDED RELEASE ORAL
Qty: 180 TABLET | Refills: 3 | Status: SHIPPED | OUTPATIENT
Start: 2023-04-24 | End: 2024-03-15

## 2023-04-24 RX ORDER — TRAZODONE HYDROCHLORIDE 50 MG/1
50 TABLET, FILM COATED ORAL
Qty: 90 TABLET | Refills: 3 | Status: SHIPPED | OUTPATIENT
Start: 2023-04-24 | End: 2024-03-15

## 2023-04-24 NOTE — TELEPHONE ENCOUNTER
Refill request for: oxybutynin ER (DITROPAN XL) 15 MG 24 hr tablet   Directions: Take 2 tablets (30 mg) by mouth At Bedtime    LOV: 4/7/23  NOV: 4/9/24    90 day supply with 3 refills Medication T'd for review and signature  Annabella Dominguez CMA on 4/24/2023 at 10:22 AM

## 2023-04-24 NOTE — TELEPHONE ENCOUNTER
Refill request for: trazodone 50mg   Directions: Take 1 tablet (50 mg) by mouth nightly as needed for sleep     LOV: 04/07/23  NOV: 04/09/24    90 day supply with 3 refills Medication T'd for review and signature    Edith Em LPN on 4/24/2023 at 10:27 AM

## 2023-04-25 ENCOUNTER — LAB (OUTPATIENT)
Dept: LAB | Facility: CLINIC | Age: 68
End: 2023-04-25
Payer: COMMERCIAL

## 2023-04-25 DIAGNOSIS — E05.00 GRAVES DISEASE: ICD-10-CM

## 2023-04-25 LAB
T4 FREE SERPL-MCNC: 1.09 NG/DL (ref 0.9–1.7)
TSH SERPL DL<=0.005 MIU/L-ACNC: 3.8 UIU/ML (ref 0.3–4.2)

## 2023-04-25 PROCEDURE — 84443 ASSAY THYROID STIM HORMONE: CPT

## 2023-04-25 PROCEDURE — 36415 COLL VENOUS BLD VENIPUNCTURE: CPT

## 2023-04-25 PROCEDURE — 84439 ASSAY OF FREE THYROXINE: CPT

## 2023-05-15 ENCOUNTER — OFFICE VISIT (OUTPATIENT)
Dept: FAMILY MEDICINE | Facility: CLINIC | Age: 68
End: 2023-05-15
Payer: COMMERCIAL

## 2023-05-15 VITALS
HEART RATE: 78 BPM | OXYGEN SATURATION: 98 % | RESPIRATION RATE: 20 BRPM | DIASTOLIC BLOOD PRESSURE: 84 MMHG | BODY MASS INDEX: 22.99 KG/M2 | TEMPERATURE: 97.4 F | WEIGHT: 160.6 LBS | SYSTOLIC BLOOD PRESSURE: 114 MMHG | HEIGHT: 70 IN

## 2023-05-15 DIAGNOSIS — F32.5 MAJOR DEPRESSIVE DISORDER WITH SINGLE EPISODE, IN FULL REMISSION (H): ICD-10-CM

## 2023-05-15 DIAGNOSIS — G35 MULTIPLE SCLEROSIS (H): Primary | ICD-10-CM

## 2023-05-15 DIAGNOSIS — Z12.11 SCREEN FOR COLON CANCER: ICD-10-CM

## 2023-05-15 DIAGNOSIS — E28.39 OVARIAN FAILURE DUE TO MENOPAUSE: ICD-10-CM

## 2023-05-15 DIAGNOSIS — I73.00 RAYNAUD'S SYNDROME WITHOUT GANGRENE: ICD-10-CM

## 2023-05-15 DIAGNOSIS — F17.219 CIGARETTE NICOTINE DEPENDENCE WITH NICOTINE-INDUCED DISORDER: ICD-10-CM

## 2023-05-15 DIAGNOSIS — M81.0 AGE-RELATED OSTEOPOROSIS WITHOUT CURRENT PATHOLOGICAL FRACTURE: ICD-10-CM

## 2023-05-15 DIAGNOSIS — H17.9 LEFT CORNEAL SCAR: ICD-10-CM

## 2023-05-15 DIAGNOSIS — R32 URINARY INCONTINENCE, UNSPECIFIED TYPE: ICD-10-CM

## 2023-05-15 DIAGNOSIS — E78.2 MIXED HYPERLIPIDEMIA: ICD-10-CM

## 2023-05-15 DIAGNOSIS — E05.90 HYPERTHYROIDISM: ICD-10-CM

## 2023-05-15 DIAGNOSIS — R00.0 TACHYCARDIA: ICD-10-CM

## 2023-05-15 LAB
ANION GAP SERPL CALCULATED.3IONS-SCNC: 11 MMOL/L (ref 7–15)
BUN SERPL-MCNC: 21.2 MG/DL (ref 8–23)
CALCIUM SERPL-MCNC: 9.7 MG/DL (ref 8.8–10.2)
CHLORIDE SERPL-SCNC: 107 MMOL/L (ref 98–107)
CREAT SERPL-MCNC: 0.85 MG/DL (ref 0.51–0.95)
DEPRECATED HCO3 PLAS-SCNC: 25 MMOL/L (ref 22–29)
GFR SERPL CREATININE-BSD FRML MDRD: 75 ML/MIN/1.73M2
GLUCOSE SERPL-MCNC: 100 MG/DL (ref 70–99)
POTASSIUM SERPL-SCNC: 4.4 MMOL/L (ref 3.4–5.3)
SODIUM SERPL-SCNC: 143 MMOL/L (ref 136–145)

## 2023-05-15 PROCEDURE — 99214 OFFICE O/P EST MOD 30 MIN: CPT | Performed by: FAMILY MEDICINE

## 2023-05-15 PROCEDURE — 80048 BASIC METABOLIC PNL TOTAL CA: CPT | Performed by: FAMILY MEDICINE

## 2023-05-15 PROCEDURE — 36415 COLL VENOUS BLD VENIPUNCTURE: CPT | Performed by: FAMILY MEDICINE

## 2023-05-15 RX ORDER — NIFEDIPINE 30 MG
30 TABLET, EXTENDED RELEASE ORAL DAILY
Qty: 90 TABLET | Refills: 3 | Status: SHIPPED | OUTPATIENT
Start: 2023-05-15 | End: 2024-09-11

## 2023-05-15 RX ORDER — VALACYCLOVIR HYDROCHLORIDE 500 MG/1
500 TABLET, FILM COATED ORAL DAILY
Qty: 30 TABLET | Refills: 0 | Status: CANCELLED | OUTPATIENT
Start: 2023-05-15

## 2023-05-15 RX ORDER — SIMVASTATIN 20 MG
20 TABLET ORAL AT BEDTIME
Qty: 90 TABLET | Refills: 3 | Status: SHIPPED | OUTPATIENT
Start: 2023-05-15 | End: 2024-05-02

## 2023-05-15 RX ORDER — METOPROLOL SUCCINATE 25 MG/1
TABLET, EXTENDED RELEASE ORAL
Qty: 90 TABLET | Refills: 3 | Status: SHIPPED | OUTPATIENT
Start: 2023-05-15 | End: 2024-04-01

## 2023-05-15 RX ORDER — IMIPRAMINE HCL 50 MG
TABLET ORAL
Qty: 90 TABLET | Refills: 3 | Status: SHIPPED | OUTPATIENT
Start: 2023-05-15 | End: 2024-09-11

## 2023-05-15 ASSESSMENT — ANXIETY QUESTIONNAIRES
7. FEELING AFRAID AS IF SOMETHING AWFUL MIGHT HAPPEN: NOT AT ALL
5. BEING SO RESTLESS THAT IT IS HARD TO SIT STILL: NOT AT ALL
2. NOT BEING ABLE TO STOP OR CONTROL WORRYING: NOT AT ALL
6. BECOMING EASILY ANNOYED OR IRRITABLE: NOT AT ALL
GAD7 TOTAL SCORE: 0
7. FEELING AFRAID AS IF SOMETHING AWFUL MIGHT HAPPEN: NOT AT ALL
GAD7 TOTAL SCORE: 0
4. TROUBLE RELAXING: NOT AT ALL
3. WORRYING TOO MUCH ABOUT DIFFERENT THINGS: NOT AT ALL
8. IF YOU CHECKED OFF ANY PROBLEMS, HOW DIFFICULT HAVE THESE MADE IT FOR YOU TO DO YOUR WORK, TAKE CARE OF THINGS AT HOME, OR GET ALONG WITH OTHER PEOPLE?: NOT DIFFICULT AT ALL
1. FEELING NERVOUS, ANXIOUS, OR ON EDGE: NOT AT ALL
IF YOU CHECKED OFF ANY PROBLEMS ON THIS QUESTIONNAIRE, HOW DIFFICULT HAVE THESE PROBLEMS MADE IT FOR YOU TO DO YOUR WORK, TAKE CARE OF THINGS AT HOME, OR GET ALONG WITH OTHER PEOPLE: NOT DIFFICULT AT ALL
GAD7 TOTAL SCORE: 0

## 2023-05-15 ASSESSMENT — PATIENT HEALTH QUESTIONNAIRE - PHQ9
SUM OF ALL RESPONSES TO PHQ QUESTIONS 1-9: 2
SUM OF ALL RESPONSES TO PHQ QUESTIONS 1-9: 2
10. IF YOU CHECKED OFF ANY PROBLEMS, HOW DIFFICULT HAVE THESE PROBLEMS MADE IT FOR YOU TO DO YOUR WORK, TAKE CARE OF THINGS AT HOME, OR GET ALONG WITH OTHER PEOPLE: SOMEWHAT DIFFICULT

## 2023-05-15 ASSESSMENT — PAIN SCALES - GENERAL: PAINLEVEL: NO PAIN (0)

## 2023-05-15 NOTE — PROGRESS NOTES
Assessment & Plan     Multiple sclerosis (H)  Has been stable  - imipramine (TOFRANIL) 50 MG tablet; TAKE 1 TABLET BY MOUTH AT BEDTIME GENERIC EQUIVALENT FOR TOFRANIL    Major depressive disorder with single episode, in full remission (H)  Reviewed phq9 and controlled    Urinary incontinence, unspecified type  Due to MS and using meds and catheters  - imipramine (TOFRANIL) 50 MG tablet; TAKE 1 TABLET BY MOUTH AT BEDTIME GENERIC EQUIVALENT FOR TOFRANIL    Tachycardia  Controlled on med check lab  - metoprolol succinate ER (TOPROL XL) 25 MG 24 hr tablet; TAKE 1 TABLET BY MOUTH DAILY.  - Basic metabolic panel; Future    Hyperthyroidism  Seeing endocrinology  - metoprolol succinate ER (TOPROL XL) 25 MG 24 hr tablet; TAKE 1 TABLET BY MOUTH DAILY.    Raynaud's syndrome without gangrene   Stable on med  - NIFEdipine ER (ADALAT CC) 30 MG 24 hr tablet; Take 1 tablet (30 mg) by mouth daily    Mixed hyperlipidemia  Stable on med  - simvastatin (ZOCOR) 20 MG tablet; Take 1 tablet (20 mg) by mouth At Bedtime    Left corneal scar  Seeing eye doctor and getting valtrex from them    Screen for colon cancer  Declined colon cancer screening    Cigarette nicotine dependence with nicotine-induced disorder  Get CT scan  - CT Chest Lung Cancer Scrn Low Dose wo; Future    Ovarian failure due to menopause  Get scan  - DEXA HIP/PELVIS/SPINE - Future; Future             Nicotine/Tobacco Cessation:  She reports that she has been smoking cigarettes. She has a 45.00 pack-year smoking history. She has never used smokeless tobacco.  Nicotine/Tobacco Cessation Plan:   Information offered: Patient not interested at this time      See Patient Instructions    Robb Cabrera MD  [facility]    Heber Castaneda is a 67 year old, presenting for the following health issues:  Recheck Medication        5/15/2023     1:15 PM   Additional Questions   Roomed by Milly Aguila MA   Accompanied by Self         5/15/2023      "1:15 PM   Patient Reported Additional Medications   Patient reports taking the following new medications None     History of Present Illness       Reason for visit:  Renew meds    She eats 2-3 servings of fruits and vegetables daily.She consumes 1 sweetened beverage(s) daily.She exercises with enough effort to increase her heart rate 9 or less minutes per day.  She exercises with enough effort to increase her heart rate 3 or less days per week.   She is taking medications regularly.    Today's PHQ-9         PHQ-9 Total Score: 2    PHQ-9 Q9 Thoughts of better off dead/self-harm past 2 weeks :   Not at all    How difficult have these problems made it for you to do your work, take care of things at home, or get along with other people: Somewhat difficult  Today's MARISELA-7 Score: 0       Hyperlipidemia Follow-Up      Are you regularly taking any medication or supplement to lower your cholesterol?   Yes- Simvastatin    Are you having muscle aches or other side effects that you think could be caused by your cholesterol lowering medication?  No    Medication recheck for Imipramine  Taking as prescribed: Yes  Side Effects: non  Helping sx: Yes    Medication recheck for Nifedipine  Taking as prescribed: Yes  Side Effects: None  Helping sx: Yes    Medication recheck for Valtrex  Taking as prescribed: Yes  Side Effects: None  Helping sx: Yes    Hypothyroidism Follow-up      Since last visit, patient describes the following symptoms: Weight stable, no hair loss, no skin changes, no constipation, no loose stools    She is self catheterization  This is due to her MS.  She is self catheterization every 3-4 hours.  7-8 per day.      Review of Systems         Objective    /84 (BP Location: Right arm, Patient Position: Sitting, Cuff Size: Adult Regular)   Pulse 78   Temp 97.4  F (36.3  C) (Tympanic)   Resp 20   Ht 1.778 m (5' 10\")   Wt 72.8 kg (160 lb 9.6 oz)   SpO2 98%   BMI 23.04 kg/m    Body mass index is 23.04 " kg/m .  Physical Exam   GENERAL APPEARANCE: alert, no distress and cooperative  RESP: lungs clear to auscultation - no rales, rhonchi or wheezes  CV: regular rates and rhythm, normal S1 S2, no S3 or S4 and no murmur, click or rub  ABDOMEN: soft, nontender, without hepatosplenomegaly or masses and bowel sounds normal  MS: extremities normal- no gross deformities noted  SKIN: no suspicious lesions or rashes  NEURO: general weakness due to MS  PSYCH: mentation appears normal and affect normal/bright

## 2023-05-15 NOTE — PATIENT INSTRUCTIONS
I refilled your medications.    Please get the valtrex from your eye doctor.    Please get a CT scan of your lungs to screen for lung cancer.    Please get a dexa scan done to check for weakening of your bones.          Thank you for choosing [facility].  You may be receiving an email and/or telephone survey request from [facility] Customer Experience regarding your visit today.  Please take a few minutes to respond to the survey to let us know how we are doing.      If you have questions or concerns, please contact us via [facility] or you can contact your care team at [phone] option [phone].    Our Clinic hours are:  Monday - Thursday 7am-6pm  Friday 7am-5pm    The [place] outpatient lab hours are:  Monday - Friday 7am-4:30pm    Appointments are required, call [phone]    If you have clinical questions after hours or would like to schedule an appointment,  call the clinic at [phone].

## 2023-05-30 ENCOUNTER — HOSPITAL ENCOUNTER (OUTPATIENT)
Dept: CT IMAGING | Facility: CLINIC | Age: 68
Discharge: HOME | End: 2023-05-30
Attending: FAMILY MEDICINE
Payer: COMMERCIAL

## 2023-05-30 ENCOUNTER — HOSPITAL ENCOUNTER (OUTPATIENT)
Dept: BONE DENSITY | Facility: CLINIC | Age: 68
Discharge: HOME | End: 2023-05-30
Attending: FAMILY MEDICINE
Payer: COMMERCIAL

## 2023-05-30 DIAGNOSIS — F17.219 CIGARETTE NICOTINE DEPENDENCE WITH NICOTINE-INDUCED DISORDER: ICD-10-CM

## 2023-05-30 DIAGNOSIS — R91.8 PULMONARY NODULES: Primary | ICD-10-CM

## 2023-05-30 DIAGNOSIS — E28.39 OVARIAN FAILURE DUE TO MENOPAUSE: ICD-10-CM

## 2023-05-30 DIAGNOSIS — G35 MULTIPLE SCLEROSIS (H): ICD-10-CM

## 2023-05-30 PROCEDURE — 77080 DXA BONE DENSITY AXIAL: CPT

## 2023-05-30 PROCEDURE — 71271 CT THORAX LUNG CANCER SCR C-: CPT

## 2023-05-30 RX ORDER — ALENDRONATE SODIUM 70 MG/1
70 TABLET ORAL
Qty: 13 TABLET | Refills: 3 | Status: SHIPPED | OUTPATIENT
Start: 2023-05-30 | End: 2024-09-13

## 2023-05-31 ENCOUNTER — PATIENT OUTREACH (OUTPATIENT)
Dept: ONCOLOGY | Facility: CLINIC | Age: 68
End: 2023-05-31
Payer: COMMERCIAL

## 2023-05-31 DIAGNOSIS — R91.8 PULMONARY NODULES: Primary | ICD-10-CM

## 2023-05-31 NOTE — PROGRESS NOTES
New Patient Oncology Nurse Navigator Note     Referring provider: Dr. Robb Cabrera    Referring Clinic/Organization: [facility]     Referred to: Interventional Pulmonology    Requested provider (if applicable): First available - did not specify     Referral Received: 05/31/23       Evaluation for :   Diagnosis   R91.8 (ICD-10-CM) - Pulmonary nodules   F17.219 (ICD-10-CM) - Cigarette nicotine dependence with nicotine-induced disorder   G35 (ICD-10-CM) - Multiple sclerosis (H)     Clinical History (per Nurse review of records provided):    05/30/2023 CT Chest Lung Cancer Screen (bookmarked) showed:   FINDINGS:  Lungs: Mild biapical pleural-parenchymal scarring and paraseptal  emphysema is seen. A focal area of suspected pleural parenchymal  scarring in the anterior left upper lobe near the apex is also noted  and can be monitored on future annual surveillance imaging. There is  an irregular scar like nodule in the anterior left upper lobe  measuring 8 x 5 mm, mean diameter 6.5 mm (5-163), which is  indeterminate. A noncalcified 5 x 4 mm nodule in the left upper lobe  (5-105) is also noted. A dominant noncalcified pulmonary nodule is  seen in the right lower lobe measuring 12 x 9 mm, mean diameter 10.5  mm (5-202). A couple additional tiny 2 to 3 mm pulmonary nodules are  noted. Linear atelectasis and/or scarring are seen in the right lower  lobe and, to a lesser extent, the lingular segment of the left upper  lobe. No pleural fluid. Central airways are patent.     Additional findings: The heart size is normal. No pericardial  effusion. Minimal coronary artery atherosclerosis. Thoracic aorta is  normal in course and caliber with mild thoracic aortic atherosclerosis  noted. No enlarged thoracic lymph nodes. Multiple calcified gallstones  are seen layering in the gallbladder lumen. No signs of acute  cholecystitis, as seen. Low density nodularity of the adrenal glands  is compatible with benign adenomatous  change. No follow-up is  necessary. Multilevel degenerative changes of the spine are present,  including multilevel bridging osteophytes. No acute osseous  abnormality.                                                                      IMPRESSION:   1. ACR Assessment Category:  Lung-RADS Category 4A. Suspicious.  Additional diagnostic testing and/or tissue sampling is recommended. 3  month LDCT  (please order exam code IMG 2163); PET/CT may be used when  there is a >/= 8 mm solid component.   2. Significant Incidental Finding(s):  Category S: No.  3. Cholelithiasis without evidence of acute cholecystitis.    Clinical Assessment / Barriers to Care (Per Nurse):  Tobacco History    Smoking Status   Every Day Smoking Frequency   1 pack/day for 45.00 years (45.00 pk-yrs) Smoking Tobacco Type   Cigarettes     Records Location: [facility] Everywhere     Records Needed:   Additional testing needed prior to consult: PFTs  Referral updates and Plan:   Writer called Tonya and discussed the referral with her. She reports she has video capability and would feel comfortable doing a video visit with Dr. Marrero on June 19th. Writer will send scheduling instructions to NPS to call pt to finalize these appts. She is requesting to do her PFTs in [place] as that is a convenient location for her. All questions answered.     Jessica Knapp, ARETHAN, RN, OCN  [facility] Oncology Nurse Navigator  [phone] / [phone]

## 2023-06-01 ENCOUNTER — HEALTH MAINTENANCE LETTER (OUTPATIENT)
Age: 68
End: 2023-06-01

## 2023-06-13 ENCOUNTER — HOSPITAL ENCOUNTER (OUTPATIENT)
Dept: RESPIRATORY THERAPY | Facility: CLINIC | Age: 68
Discharge: HOME | End: 2023-06-13
Attending: FAMILY MEDICINE | Admitting: FAMILY MEDICINE
Payer: COMMERCIAL

## 2023-06-13 DIAGNOSIS — R91.8 PULMONARY NODULES: ICD-10-CM

## 2023-06-13 PROCEDURE — 94375 RESPIRATORY FLOW VOLUME LOOP: CPT

## 2023-06-13 PROCEDURE — 94726 PLETHYSMOGRAPHY LUNG VOLUMES: CPT

## 2023-06-13 PROCEDURE — 94729 DIFFUSING CAPACITY: CPT

## 2023-06-15 ENCOUNTER — TRANSFERRED RECORDS (OUTPATIENT)
Dept: MULTI SPECIALTY CLINIC | Facility: CLINIC | Age: 68
End: 2023-06-15

## 2023-06-15 LAB
DLCOUNC-%PRED-PRE: 70 %
DLCOUNC-PRE: 15.4 ML/MIN/MMHG
DLCOUNC-PRED: 21.82 ML/MIN/MMHG
ERV-%PRED-PRE: 113 %
ERV-PRE: 1.07 L
ERV-PRED: 0.95 L
EXPTIME-PRE: 5.08 SEC
FEF2575-%PRED-PRE: 77 %
FEF2575-PRE: 1.71 L/SEC
FEF2575-PRED: 2.22 L/SEC
FEFMAX-%PRED-PRE: 47 %
FEFMAX-PRE: 3.18 L/SEC
FEFMAX-PRED: 6.7 L/SEC
FEV1-%PRED-PRE: 78 %
FEV1-PRE: 2.12 L
FEV1FEV6-PRE: 71 %
FEV1FEV6-PRED: 80 %
FEV1FVC-PRE: 73 %
FEV1FVC-PRED: 78 %
FEV1SVC-PRE: 66 %
FEV1SVC-PRED: 73 %
FIFMAX-PRE: 1.25 L/SEC
FRCPLETH-%PRED-PRE: 107 %
FRCPLETH-PRE: 3.65 L
FRCPLETH-PRED: 3.39 L
FVC-%PRED-PRE: 82 %
FVC-PRE: 2.91 L
FVC-PRED: 3.52 L
IC-%PRED-PRE: 75 %
IC-PRE: 2.05 L
IC-PRED: 2.71 L
RVPLETH-%PRED-PRE: 111 %
RVPLETH-PRE: 2.51 L
RVPLETH-PRED: 2.26 L
TLCPLETH-%PRED-PRE: 95 %
TLCPLETH-PRE: 5.7 L
TLCPLETH-PRED: 5.99 L
VA-%PRED-PRE: 86 %
VA-PRE: 4.71 L
VC-%PRED-PRE: 86 %
VC-PRE: 3.19 L
VC-PRED: 3.7 L

## 2023-06-19 ENCOUNTER — VIRTUAL VISIT (OUTPATIENT)
Dept: PULMONOLOGY | Facility: CLINIC | Age: 68
End: 2023-06-19
Attending: FAMILY MEDICINE
Payer: COMMERCIAL

## 2023-06-19 VITALS
BODY MASS INDEX: 23.7 KG/M2 | HEIGHT: 69 IN | OXYGEN SATURATION: 98 % | RESPIRATION RATE: 16 BRPM | HEART RATE: 82 BPM | DIASTOLIC BLOOD PRESSURE: 74 MMHG | SYSTOLIC BLOOD PRESSURE: 107 MMHG | WEIGHT: 160 LBS

## 2023-06-19 DIAGNOSIS — F17.219 CIGARETTE NICOTINE DEPENDENCE WITH NICOTINE-INDUCED DISORDER: ICD-10-CM

## 2023-06-19 DIAGNOSIS — R91.8 PULMONARY NODULES: ICD-10-CM

## 2023-06-19 PROCEDURE — 99204 OFFICE O/P NEW MOD 45 MIN: CPT | Performed by: INTERNAL MEDICINE

## 2023-06-19 RX ORDER — TIMOLOL MALEATE 5 MG/ML
SOLUTION/ DROPS OPHTHALMIC
COMMUNITY
Start: 2023-05-04 | End: 2024-04-09

## 2023-06-19 ASSESSMENT — PAIN SCALES - GENERAL: PAINLEVEL: NO PAIN (0)

## 2023-06-19 NOTE — PROGRESS NOTES
"Virtual Visit Details    Type of service:  Video Visit   Video Start Time: {video visit start/end time for provider to select:191994}  Video End Time:{video visit start/end time for provider to select:992976}    Originating Location (pt. Location): {video visit patient location:291020::\"Home\"}  {PROVIDER LOCATION On-site should be selected for visits conducted from your clinic location or adjoining [facility] hospital, academic office, or other nearby [facility] building. Off-site should be selected for all other provider locations, including home:512364}  Distant Location (provider location):  {virtual location provider:209669}  Platform used for Video Visit: {Virtual Visit Platforms:953276::\"Progression Labs\"}  "

## 2023-06-19 NOTE — PROGRESS NOTES
LUNG NODULE & INTERVENTIONAL PULMONARY CLINIC  [facility]     Tonya Rodriguez MRN# [id number]   Age: 67 year old YOB: 1955     Reason for Consultation: Lung Nodule    Requesting Physician: Robb Cabrera MD  [address] [place] [address]    Assessment and Plan:    1. New multiple pulmonary lung nodule(s). Given the characteristics on current/previous imaging and risk factors; I would classify this to be Intermediate (6-65%) risk for cancer. Culprit nodule is the 12mm in RLL. Will pursue close surveillance, CT in 3mo. Has another RLL streaky nodule which could be scar tissue but will need to observe as well. Other iac85vi nodules are calcified/non-calcified and less concerning.     2. Tobacco use. Wants to quit. Will refer to [id number] program and would like to try chantix.     Billing: I spent a total of 45min spent on date of encounter which includes prep time, visit with the patient and post visit work including documentation and nursing communication     Rayshawn Marrero MD, MHA  Associate Professor of Medicine  Section of Interventional Pulmonology   Division of Pulmonary, Allergy, Critical Care and Sleep Medicine   [facility]  Pager: [phone]   Office: [phone]  Email: [email or web address]    Mayte Perez RN   Interventional Pulmonary Care Coordinator   Office: [phone]  Email: [email or web address]     Jadiel Powell  Interventional Pulmonary Surgery Scheduler   Office: [phone]  Email: [email or web address]         History:     Tonya Rodriguez is a 67 year old female with sig h/o for MS, HTN, hyperlipidemia who is here for evaluation/followup of Lung Nodule.    - No new resp sx or complaints. Denies dyspnea or cough.   - had lung cancer screening ct and here for evaluation for cat 4a   - Personal hx of cancer: no   - Family hx of cancer: Dad had  SCLC.   - Exposure hx: Denies asbestos or radon exposure   - Tobacco hx: Current Smoker, 3ppd since 9yo. Down to 0.75ppd for the past 2 years. Longest quit was <2 weeks. Tried NRT in the past   - My interpretation of the images relevant for this visit includes: nodules   - My interpretation of the PFT's relevant for this visit includes: Normal     Culprit Nodule(s):   1: Right lower lobe nodule and is 12 mm in size/severity and non-calcified in morphology/quality. First seen by chest CT on 5/30/23. First observed on this date .  2: Right lower lobe nodule and is 14 mm in size/severity and non-calcified in morphology/quality. 3. First seen by chest CT on 5/30/23. First observed on this date .  Multiple bilateral lung nodule(s) that are sub 10 mm. First seen by chest CT on 5/30/23. There is no interval change    Other active medical problems include:   - has HTN, hyperlipidemia. stable   - has MS.            Past Medical History:      Past Medical History:   Diagnosis Date     Chest pain, unspecified 3/22/04    10/10 initially inseverity - responded to Aspirin and two doses of Nitroglycerin sublingual      Herpes simplex with other ophthalmic complications     on valtrex     Lipoma of unspecified site     Lower left suprascapular     Migraine, unspecified, with intractable migraine, so stated, without mention of status migrainosus 1/20/2007    midrin - about 30 pill a year           Past Surgical History:      Past Surgical History:   Procedure Laterality Date     APPENDECTOMY  1980's    Retained suture     ARTHROPLASTY HIP  9/19/2012    Procedure: ARTHROPLASTY HIP;  Right Total Hip Minimally Invasive Surgery;  Surgeon: Devendra Douglas MD;  Location: [facility]     ARTHROSCOPY KNEE RT/LT  1989    Arthroscopy of the Left Knee     BUNIONECTOMY RT/LT      Bilateral bunionectomies x4 surgeries     DILATION AND CURETTAGE, OPERATIVE HYSTEROSCOPY WITH MORCELLATOR, COMBINED N/A 5/15/2019    Procedure: DILATION AND  "CURETTAGE,Hysteroscopy;  Surgeon: Lauren Moise MD;  Location: [facility] OR     EXCISE LESION TRUNK N/A 4/17/2015    Procedure: EXCISE LESION TRUNK;  Surgeon: Wander Gutierrez MD;  Location: [facility]     SURGICAL HISTORY OF -   1974, 1977    two normal deliveries     TUBAL LIGATION  1981-82          Social History:     Social History     Tobacco Use     Smoking status: Every Day     Packs/day: 1.00     Years: 45.00     Pack years: 45.00     Types: Cigarettes     Smokeless tobacco: Never   Vaping Use     Vaping status: Never Used   Substance Use Topics     Alcohol use: No          Family History:     Family History   Problem Relation Age of Onset     Hypertension Mother      Heart Disease Mother      Migraines Mother      Cancer Father         lung     Heart Disease Father      Diabetes Father      Heart Disease Maternal Grandfather      Cancer Sister         cervical     Heart Disease Son         mummer     Alcohol/Drug Brother      Alcoholism Brother      Genitourinary Problems Brother         stones     Coronary Artery Disease Brother         injury \" maker\" tree fell on him     Glaucoma No family hx of      Macular Degeneration No family hx of      Aneurysm No family hx of      Brain Hemorrhage No family hx of      Seizure Disorder No family hx of      Cerebrovascular Disease No family hx of      Depression No family hx of            Allergies:      Allergies   Allergen Reactions     Cipro [Ciprofloxacin] Rash     gets yeast infections - BAD with.     Lactulose GI Disturbance     Caused Vomiting          Medications:     Current Outpatient Medications   Medication Sig     alendronate (FOSAMAX) 70 MG tablet Take 1 tablet (70 mg) by mouth every 7 days     gabapentin (NEURONTIN) 100 MG capsule Take 2 capsules (200 mg) by mouth At Bedtime     imipramine (TOFRANIL) 50 MG tablet TAKE 1 TABLET BY MOUTH AT BEDTIME GENERIC EQUIVALENT FOR TOFRANIL     methimazole (TAPAZOLE) 5 MG tablet Take 1 tablet (5 mg) by mouth " daily     metoprolol succinate ER (TOPROL XL) 25 MG 24 hr tablet TAKE 1 TABLET BY MOUTH DAILY.     NIFEdipine ER (ADALAT CC) 30 MG 24 hr tablet Take 1 tablet (30 mg) by mouth daily     order for DME Equipment being ordered: urinary catheter 6 times daily     oxybutynin ER (DITROPAN XL) 15 MG 24 hr tablet TAKE 2 TABLETS BY MOUTH AT BEDTIME     prednisoLONE acetate (PRED FORTE) 1 % ophthalmic suspension      simvastatin (ZOCOR) 20 MG tablet Take 1 tablet (20 mg) by mouth At Bedtime     timolol maleate (TIMOPTIC) 0.5 % ophthalmic solution Place 1 drop into each eye EVERY MORNING     traZODone (DESYREL) 50 MG tablet Take 1 tablet (50 mg) by mouth nightly as needed for sleep     valACYclovir (VALTREX) 500 MG tablet Take 1 tablet (500 mg) by mouth daily Will prescribe only for 2 months to last you until you have an appointment with our [facility] doctors. Please schedule an appointment with your eye doctor at [phone].     No current facility-administered medications for this visit.          Review of Systems:     CONSTITUTIONAL: negative for fever, chills, change in weight  INTEGUMENTARY/SKIN: no rash or obvious new lesions  ENT/MOUTH: no sore throat, new sinus pain or nasal drainage  RESP: see interval history  CV: negative for chest pain, palpitations or peripheral edema  GI: no nausea, vomiting, change in stools  : no dysuria  MUSCULOSKELETAL: no myalgias, arthralgias  ENDOCRINE: blood sugars with adequate control  PSYCHIATRIC: mood stable  LYMPHATIC: no new lymphadenopathy  HEME: no bleeding or easy bruisability  NEURO: no numbness, weakness, headaches         Physical Exam:     Constitutional - looks well, in no apparent distress  Respiratory -breathing appears comfortable.   Skin - No appreciable discoloration or lesions (very limited exam)  Neurological - No apparent tremors. Speech fluent and articlate          Current Laboratory Data:   All laboratory and imaging data reviewed.    No results found for this or  any previous visit (from the past 24 hour(s)).      Latest Ref Rng & Units 6/13/2023     1:00 PM   PFT   FVC L 2.91     FEV1 L 2.12     FVC% % 82     FEV1% % 78

## 2023-06-19 NOTE — PROGRESS NOTES
"Tonya Rodriguez's goals for this visit include: Consult  She requests these members of her care team be copied on today's visit information: PCP    PCP: Robb Cabrera    Referring Provider:  Robb Cabrera MD  [address] [place] [address]    /74   Pulse 82   Resp 16   Ht 1.753 m (5' 9.02\")   Wt 72.6 kg (160 lb)   SpO2 98%   BMI 23.62 kg/m      Do you need any medication refills at today's visit? N      Vera Nielson LPN  Pulmonary Medicine:  [facility]  Phone: [phone] Fax: [phone]      "

## 2023-08-02 ENCOUNTER — TELEPHONE (OUTPATIENT)
Dept: FAMILY MEDICINE | Facility: CLINIC | Age: 68
End: 2023-08-02
Payer: COMMERCIAL

## 2023-08-02 NOTE — TELEPHONE ENCOUNTER
Patient Quality Outreach    Patient is due for the following:   Colon Cancer Screening    Next Steps:   No follow up needed at this time.    Type of outreach:    Chart review performed, no outreach needed. and pt declined colon cancer screening on 05/15/23 and did not want to be reminded      Questions for provider review:    None           Pushpa Knapp, CMA

## 2023-08-21 ENCOUNTER — OFFICE VISIT (OUTPATIENT)
Dept: PULMONOLOGY | Facility: CLINIC | Age: 68
End: 2023-08-21
Payer: COMMERCIAL

## 2023-08-21 ENCOUNTER — ANCILLARY PROCEDURE (OUTPATIENT)
Dept: CT IMAGING | Facility: CLINIC | Age: 68
End: 2023-08-21
Attending: INTERNAL MEDICINE
Payer: COMMERCIAL

## 2023-08-21 VITALS
OXYGEN SATURATION: 100 % | BODY MASS INDEX: 22.96 KG/M2 | RESPIRATION RATE: 16 BRPM | HEIGHT: 70 IN | DIASTOLIC BLOOD PRESSURE: 79 MMHG | SYSTOLIC BLOOD PRESSURE: 113 MMHG | HEART RATE: 89 BPM

## 2023-08-21 DIAGNOSIS — R91.8 PULMONARY NODULES: ICD-10-CM

## 2023-08-21 DIAGNOSIS — R91.8 PULMONARY NODULES: Primary | ICD-10-CM

## 2023-08-21 PROCEDURE — 99215 OFFICE O/P EST HI 40 MIN: CPT | Performed by: INTERNAL MEDICINE

## 2023-08-21 PROCEDURE — 71250 CT THORAX DX C-: CPT | Mod: GC | Performed by: RADIOLOGY

## 2023-08-21 ASSESSMENT — PAIN SCALES - GENERAL: PAINLEVEL: NO PAIN (0)

## 2023-08-21 NOTE — PROGRESS NOTES
"Tonya Rodriguez's goals for this visit include: Return  She requests these members of her care team be copied on today's visit information: PCP    PCP: Robb Cabrera    Referring Provider:  No referring provider defined for this encounter.    /79   Pulse 89   Resp 16   Ht 1.778 m (5' 10\")   SpO2 100%   BMI 22.96 kg/m      Do you need any medication refills at today's visit? N      Vera Nielson LPN  Pulmonary Medicine:  [facility]  Phone: [phone] Fax: [phone]      "

## 2023-08-21 NOTE — PROGRESS NOTES
LUNG NODULE & INTERVENTIONAL PULMONARY CLINIC  [facility]     Tonya Rodriguez MRN# [id number]   Age: 68 year old YOB: 1955     Reason for Consultation: Lung Nodule    Requesting Physician: No referring provider defined for this encounter.    Assessment and Plan:    1. Established multiple pulmonary lung nodule(s). No interval change in last 3mo. Repeat CT in 6mo     2. Tobacco use. Cutting down on smoking.        Billing: I spent a total of 45min spent on date of encounter which includes prep time, visit with the patient and post visit work including documentation and nursing communication     Rayshawn Marrero MD, MHA  Associate Professor of Medicine  Section of Interventional Pulmonology   Division of Pulmonary, Allergy, Critical Care and Sleep Medicine   [facility]  Pager: [phone]   Office: [phone]  Email: [email or web address]    Mayte Perez RN   Interventional Pulmonary Care Coordinator   Office: [phone]  Email: [email or web address]     Jadiel Powell  Interventional Pulmonary Surgery Scheduler   Office: [phone]  Email: [email or web address]         History:     Tonya Rodriguez is a 67 year old female with sig h/o for MS, HTN, hyperlipidemia who is here for evaluation/followup of Lung Nodule.     - No new resp sx or complaints. Denies dyspnea or cough.   - here for followup nodules  - Personal hx of cancer: no   - Family hx of cancer: Dad had SCLC.   - Exposure hx: Denies asbestos or radon exposure   - Tobacco hx: Current Smoker, 3ppd since 11yo. Down to 0.75ppd for the past 2 years. Longest quit was <2 weeks. Tried NRT in the past   - My interpretation of the images relevant for this visit includes: nodules   - My interpretation of the PFT's relevant for this visit includes: Normal      Culprit Nodule(s):   1: Right lower lobe nodule and is 12 mm in  size/severity and non-calcified in morphology/quality. First seen by chest CT on 5/30/23. No interval change  2: Right lower lobe nodule and is 14 mm in size/severity and non-calcified in morphology/quality. 3. First seen by chest CT on 5/30/23. No interval change  Multiple bilateral lung nodule(s) that are sub 10 mm. First seen by chest CT on 5/30/23. There is no interval change     Other active medical problems include:   - has HTN, hyperlipidemia. stable   - has MS.            Past Medical History:      Past Medical History:   Diagnosis Date    Chest pain, unspecified 3/22/04    10/10 initially inseverity - responded to Aspirin and two doses of Nitroglycerin sublingual     Herpes simplex with other ophthalmic complications     on valtrex    Lipoma of unspecified site     Lower left suprascapular    Migraine, unspecified, with intractable migraine, so stated, without mention of status migrainosus 1/20/2007    midrin - about 30 pill a year           Past Surgical History:      Past Surgical History:   Procedure Laterality Date    APPENDECTOMY  1980's    Retained suture    ARTHROPLASTY HIP  9/19/2012    Procedure: ARTHROPLASTY HIP;  Right Total Hip Minimally Invasive Surgery;  Surgeon: Devendra Douglas MD;  Location: [facility]    ARTHROSCOPY KNEE RT/LT  1989    Arthroscopy of the Left Knee    BUNIONECTOMY RT/LT      Bilateral bunionectomies x4 surgeries    DILATION AND CURETTAGE, OPERATIVE HYSTEROSCOPY WITH MORCELLATOR, COMBINED N/A 5/15/2019    Procedure: DILATION AND CURETTAGE,Hysteroscopy;  Surgeon: Lauren Moise MD;  Location: [facility]    EXCISE LESION TRUNK N/A 4/17/2015    Procedure: EXCISE LESION TRUNK;  Surgeon: Wander Gutierrez MD;  Location: [facility]    SURGICAL HISTORY OF -   1974, 1977    two normal deliveries    TUBAL LIGATION  1981-82          Social History:     Social History     Tobacco Use    Smoking status: Every Day     Packs/day: 1.00     Years: 45.00     Pack years: 45.00     Types:  "Cigarettes    Smokeless tobacco: Never   Substance Use Topics    Alcohol use: No          Family History:     Family History   Problem Relation Age of Onset    Hypertension Mother     Heart Disease Mother     Migraines Mother     Cancer Father         lung    Heart Disease Father     Diabetes Father     Heart Disease Maternal Grandfather     Cancer Sister         cervical    Heart Disease Son         mummer    Alcohol/Drug Brother     Alcoholism Brother     Genitourinary Problems Brother         stones    Coronary Artery Disease Brother         injury \" maker\" tree fell on him    Glaucoma No family hx of     Macular Degeneration No family hx of     Aneurysm No family hx of     Brain Hemorrhage No family hx of     Seizure Disorder No family hx of     Cerebrovascular Disease No family hx of     Depression No family hx of            Allergies:      Allergies   Allergen Reactions    Cipro [Ciprofloxacin] Rash     gets yeast infections - BAD with.    Lactulose GI Disturbance     Caused Vomiting          Medications:     Current Outpatient Medications   Medication Sig    alendronate (FOSAMAX) 70 MG tablet Take 1 tablet (70 mg) by mouth every 7 days    gabapentin (NEURONTIN) 100 MG capsule Take 2 capsules (200 mg) by mouth At Bedtime    imipramine (TOFRANIL) 50 MG tablet TAKE 1 TABLET BY MOUTH AT BEDTIME GENERIC EQUIVALENT FOR TOFRANIL    methimazole (TAPAZOLE) 5 MG tablet Take 1 tablet (5 mg) by mouth daily    metoprolol succinate ER (TOPROL XL) 25 MG 24 hr tablet TAKE 1 TABLET BY MOUTH DAILY.    NIFEdipine ER (ADALAT CC) 30 MG 24 hr tablet Take 1 tablet (30 mg) by mouth daily    order for DME Equipment being ordered: urinary catheter 6 times daily    oxybutynin ER (DITROPAN XL) 15 MG 24 hr tablet TAKE 2 TABLETS BY MOUTH AT BEDTIME    simvastatin (ZOCOR) 20 MG tablet Take 1 tablet (20 mg) by mouth At Bedtime    timolol maleate (TIMOPTIC) 0.5 % ophthalmic solution Place 1 drop into each eye EVERY MORNING    traZODone " (DESYREL) 50 MG tablet Take 1 tablet (50 mg) by mouth nightly as needed for sleep    valACYclovir (VALTREX) 500 MG tablet Take 1 tablet (500 mg) by mouth daily Will prescribe only for 2 months to last you until you have an appointment with our [facility] doctors. Please schedule an appointment with your eye doctor at [phone].    prednisoLONE acetate (PRED FORTE) 1 % ophthalmic suspension  (Patient not taking: Reported on 8/21/2023)     No current facility-administered medications for this visit.          Review of Systems:     CONSTITUTIONAL: negative for fever, chills, change in weight  INTEGUMENTARY/SKIN: no rash or obvious new lesions  ENT/MOUTH: no sore throat, new sinus pain or nasal drainage  RESP: see interval history  CV: negative for chest pain, palpitations or peripheral edema  GI: no nausea, vomiting, change in stools  : no dysuria  MUSCULOSKELETAL: no myalgias, arthralgias  ENDOCRINE: blood sugars with adequate control  PSYCHIATRIC: mood stable  LYMPHATIC: no new lymphadenopathy  HEME: no bleeding or easy bruisability  NEURO: no numbness, weakness, headaches         Physical Exam:     Constitutional - looks well, in no apparent distress  Respiratory -breathing appears comfortable.   Skin - No appreciable discoloration or lesions (very limited exam)  Neurological - No apparent tremors. Speech fluent and articlate          Current Laboratory Data:   All laboratory and imaging data reviewed.    Results for orders placed or performed in visit on 08/21/23 (from the past 24 hour(s))   CT Chest w/o Contrast    Narrative    EXAMINATION: Chest CT  8/21/2023 2:54 PM    CLINICAL HISTORY: Pulmonary nodules    COMPARISON: 5/30/2023    TECHNIQUE: CT imaging obtained through the chest without contrast.  Axial, coronal, and sagittal reconstructions and axial MIP reformatted  images are reviewed.     CONTRAST: No contrast    FINDINGS:  Lungs: Small debris in the right main bronchus. Otherwise the central  airways are  patent. No pneumothorax or pleural effusion. No focal  airspace opacity. Similar appearance of curvilinear atelectasis in the  right lower lobe. Additional atelectasis/scarring in the lingula.  Increased size of most likely groundglass pulmonary nodule in the left  upper lobe measures 1.1 x 0.6 cm, previously 0.9 x 0.4 cm when  measured in similar fashion on image 171, series 4 with slightly  increased solid component. Stable mildly spiculated 6 mm pulmonary  nodule in the right minor fissure on image 175, series 4. Slightly  increased size of 5 mm spiculated nodule in the left upper lobe on  image 56, series 4. Other scattered solid pulmonary nodules throughout  the lungs are similar to prior. For example, the site of the primary  nodule in the right lower lobe measuring 1.0 x 0.9 cm on image 226,  series 4. Cavitary nodule in the superior right lower lobe measuring  0.9 x 0.6 cm on image 172, series 4.     Mediastinum: The visualized thyroid gland is unremarkable. The heart  size is within normal limits. No pericardial effusion. The ascending  aorta and main pulmonary artery diameters are within normal limits.  Normal appearance and configuration of the great vessels off of the  aortic arch. No suspicious mediastinal, hilar, or axillary lymph  nodes.    Bones and soft tissues: No suspicious bone findings.     Upper Abdomen: Similar appearance of bilateral adrenal adenomas.  Cholelithiasis. Pancreatic atrophy.      Impression    IMPRESSION:   1. Slightly increased size of multiple pulmonary nodules throughout  the lungs. For example, increased in size groundglass nodule in the  left upper lobe. Minimal increase in spiculated anterolateral left  upper lobe nodule. Consider PET/CT or tissue sampling for further  evaluation. Stable right lower lobe solid nodules. Stable right middle  lobe nodule.  2. Stable bilateral adrenal adenomas and cholecystitis.    I have personally reviewed the examination and initial  interpretation  and I agree with the findings.    CAMERON GARNER MD         SYSTEM ID:  [id number]         Latest Ref Rng & Units 6/13/2023     1:00 PM   PFT   FVC L 2.91    FEV1 L 2.12    FVC% % 82    FEV1% % 78

## 2023-10-19 ENCOUNTER — VIRTUAL VISIT (OUTPATIENT)
Dept: ENDOCRINOLOGY | Facility: CLINIC | Age: 68
End: 2023-10-19
Payer: COMMERCIAL

## 2023-10-19 VITALS — BODY MASS INDEX: 21.52 KG/M2 | WEIGHT: 150 LBS

## 2023-10-19 DIAGNOSIS — E05.90 HYPERTHYROIDISM: Primary | ICD-10-CM

## 2023-10-19 PROCEDURE — 99214 OFFICE O/P EST MOD 30 MIN: CPT | Mod: VID | Performed by: STUDENT IN AN ORGANIZED HEALTH CARE EDUCATION/TRAINING PROGRAM

## 2023-10-19 NOTE — PROGRESS NOTES
"Endocrinology Clinic Visit  10/19/2023      Video-Visit Details    Type of service:  Video Visit/Phone Visit    Call started at 10/19/2023, 10:30 AM.  Call completed at 10/19/2023, 11:03 AM.    Originating Location (pt. Location): Home    Distant Location (provider location):  Off-site    Mode of Communication:  Video Conference via [employer], transitioned to [employer] given poor video connection quality    Physician has received verbal consent for a Video Visit from the patient? Yes    I spent a total of 30 minutes on the date of encounter reviewing medical records, evaluating the patient, coordinating care and documenting in the EHR, as detailed above.      NAME:  oTnya Rodriguez  PCP:  Robb Cabrera  MRN:  [id number]  Reason for Consult:  Graves disease  Requesting Provider:  No ref. provider found    Chief Complaint     Chief Complaint   Patient presents with    RECHECK    Video Visit       History of Present Illness     Tonya Rodriguez is a 67 year old female who is seen in video visit for Graves' Disease. She was previously followed by Dr. Figueroa, with their most recent visit in 1/2022.    She has a PMH significant for MS.  In 2018 she had progressive symptoms included weight loss ~35 pounds, insomnia, decreased appetite, palpitation. She was diagnosed with hyperthyroidism and started on MMI since then. She had elevated TSI on 1/2019 and 2/2019. She was taking 25 mg of methimazole daily and 25 mg of metoprolol daily.      In 1/2022 she saw Dr. Figueroa, at that time continuing methimazole 25 mg every day. I reviewed all her TFT in records. Most recent ones significant for:     Latest Reference Range & Units 12/24/21 11:19 02/06/23 14:09   T4 Free 0.90 - 1.70 ng/dL 1.06 0.47 (L)   TSH 0.40 - 4.00 mU/L 0.48    TSH 0.30 - 4.20 uIU/mL  27.54 (H)        There is a note from Dr. Francis on 2/8/23 to call the patient and tell her :\"Please hold (don't take) methimazole for one week, then restart at 5 " "mg/day.  Repeat labs one week. I am submitting new Rx for methimazole 5 mg dose\"    She said at first she is taking 25 mg daily then said 2 tablets of what is prescribed. I asked to go and check her pill box and her bottle. She confirmed she is taking 5 mg daily since mid February 2023, which has remained consistent.    Today (10/19/23), she reports overall feeling well with no new symptoms or recent illnesses. No issues taking the 5 mg of methimazole and 25 mg of metoprolol daily. She has a chronic tremor and chronic constipation that is stable. No change in weight or unintentional weight loss. No palpitations or chest pain. No abdominal pain.    She said her eye symptoms are all related to her MS and not to her thyroid. Most recent appointment with ophthalmology was in 8/2023, with no medication changes recommended at that time and a reported stable exam. No change in proptosis.    Problem List     Patient Active Problem List   Diagnosis    Multiple sclerosis (H)    Allergic rhinitis    Other chronic allergic conjunctivitis    Dysphagia    Hyperlipidemia with target LDL less than 130    S/P revision of total hip  RIGHT    Osteoarthritis of hip    Major depression in complete remission (H)    Urinary incontinence    Restless leg    Herpes keratitis    Advanced directives, counseling/discussion    Family history of rheumatoid arthritis    Speech dysfluency    Self-catheterizes urinary bladder    Insomnia, unspecified type    Graves disease    Smoker    Screening for cervical cancer    Raynaud's syndrome without gangrene     Major depression in complete remission (H24)    Neurogenic bladder    Tachycardia    Hyperthyroidism    Mixed hyperlipidemia        Medications     Current Outpatient Medications   Medication    gabapentin (NEURONTIN) 100 MG capsule    imipramine (TOFRANIL) 50 MG tablet    methimazole (TAPAZOLE) 5 MG tablet    metoprolol succinate ER (TOPROL XL) 25 MG 24 hr tablet    NIFEdipine ER (ADALAT CC) 30 " MG 24 hr tablet    order for DME    oxybutynin ER (DITROPAN XL) 15 MG 24 hr tablet    simvastatin (ZOCOR) 20 MG tablet    timolol maleate (TIMOPTIC) 0.5 % ophthalmic solution    traZODone (DESYREL) 50 MG tablet    valACYclovir (VALTREX) 500 MG tablet    alendronate (FOSAMAX) 70 MG tablet    prednisoLONE acetate (PRED FORTE) 1 % ophthalmic suspension     No current facility-administered medications for this visit.        Allergies     Allergies   Allergen Reactions    Cipro [Ciprofloxacin] Rash     gets yeast infections - BAD with.    Lactulose GI Disturbance     Caused Vomiting       Medical / Surgical History     Past Medical History:   Diagnosis Date    Chest pain, unspecified 3/22/04    10/10 initially inseverity - responded to Aspirin and two doses of Nitroglycerin sublingual     Herpes simplex with other ophthalmic complications     on valtrex    Lipoma of unspecified site     Lower left suprascapular    Migraine, unspecified, with intractable migraine, so stated, without mention of status migrainosus 1/20/2007    midrin - about 30 pill a year     Past Surgical History:   Procedure Laterality Date    APPENDECTOMY  1980's    Retained suture    ARTHROPLASTY HIP  9/19/2012    Procedure: ARTHROPLASTY HIP;  Right Total Hip Minimally Invasive Surgery;  Surgeon: Devendra Douglas MD;  Location: [facility]    ARTHROSCOPY KNEE RT/LT  1989    Arthroscopy of the Left Knee    BUNIONECTOMY RT/LT      Bilateral bunionectomies x4 surgeries    DILATION AND CURETTAGE, OPERATIVE HYSTEROSCOPY WITH MORCELLATOR, COMBINED N/A 5/15/2019    Procedure: DILATION AND CURETTAGE,Hysteroscopy;  Surgeon: Lauren Moise MD;  Location: [facility]    EXCISE LESION TRUNK N/A 4/17/2015    Procedure: EXCISE LESION TRUNK;  Surgeon: Wander Gutierrez MD;  Location: [facility]    SURGICAL HISTORY OF -   1974, 1977    two normal deliveries    TUBAL LIGATION  1981-82       Social History     Social History     Socioeconomic History    Marital status:  "     Spouse name: Not on file    Number of children: Not on file    Years of education: Not on file    Highest education level: Not on file   Occupational History    Not on file   Tobacco Use    Smoking status: Every Day     Packs/day: 1.00     Years: 45.00     Additional pack years: 0.00     Total pack years: 45.00     Types: Cigarettes    Smokeless tobacco: Never   Vaping Use    Vaping Use: Never used   Substance and Sexual Activity    Alcohol use: No    Drug use: No    Sexual activity: Not Currently     Partners: Male   Other Topics Concern    Parent/sibling w/ CABG, MI or angioplasty before 65F 55M? Not Asked     Service No    Blood Transfusions No    Caffeine Concern Yes     Comment: 3 cups and 1 can    Occupational Exposure No    Hobby Hazards No    Sleep Concern No    Stress Concern No    Weight Concern No    Special Diet No    Back Care Yes     Comment: hurt years ago Jr in High School, low back    Exercise Yes    Bike Helmet No    Seat Belt Yes    Self-Exams Not Asked   Social History Narrative    Not on file     Social Determinants of Health     Financial Resource Strain: Not on file   Food Insecurity: Not on file   Transportation Needs: Not on file   Physical Activity: Not on file   Stress: Not on file   Social Connections: Not on file   Interpersonal Safety: Not on file   Housing Stability: Not on file       Family History     Family History   Problem Relation Age of Onset    Hypertension Mother     Heart Disease Mother     Migraines Mother     Cancer Father         lung    Heart Disease Father     Diabetes Father     Heart Disease Maternal Grandfather     Cancer Sister         cervical    Heart Disease Son         mummer    Alcohol/Drug Brother     Alcoholism Brother     Genitourinary Problems Brother         stones    Coronary Artery Disease Brother         injury \" maker\" tree fell on him    Glaucoma No family hx of     Macular Degeneration No family hx of     Aneurysm No family hx " of     Brain Hemorrhage No family hx of     Seizure Disorder No family hx of     Cerebrovascular Disease No family hx of     Depression No family hx of        ROS     12 ROS completed, pertinent positive and negative in HPI    Physical Exam   Wt 68 kg (150 lb)   BMI 21.52 kg/m     GENERAL: Healthy, alert and no distress  EYES: Eyes grossly normal to inspection.  No discharge or erythema, or obvious scleral/conjunctival abnormalities.  RESP: No audible wheeze, cough, or visible cyanosis.  No visible retractions or increased work of breathing.    SKIN: Visible skin clear. No significant rash, abnormal pigmentation or lesions.  NEURO: Cranial nerves grossly intact.  Mentation and speech appropriate for age.  PSYCH: Mentation appears normal, affect normal/bright, judgement and insight intact, normal speech and appearance well-groomed.     Labs/Imaging     Pertinent Labs were reviewed and updated in EPIC and discussed briefly.  Radiology Results were  reviewed and updated in EPIC and discussed briefly.    Summary of recent findings:   Lab Results   Component Value Date    A1C 6.0 04/12/2011       TSH   Date Value Ref Range Status   04/25/2023 3.80 0.30 - 4.20 uIU/mL Final   02/06/2023 27.54 (H) 0.30 - 4.20 uIU/mL Final   12/24/2021 0.48 0.40 - 4.00 mU/L Final   12/02/2019 2.11 0.40 - 4.00 mU/L Final   05/09/2019 <0.01 (L) 0.40 - 4.00 mU/L Final   02/13/2019 <0.01 (L) 0.40 - 4.00 mU/L Final   01/16/2019 <0.01 (L) 0.40 - 4.00 mU/L Final   10/05/2018 <0.01 (L) 0.40 - 4.00 mU/L Final     T4 Free   Date Value Ref Range Status   12/02/2019 0.80 0.76 - 1.46 ng/dL Final   05/09/2019 1.57 (H) 0.76 - 1.46 ng/dL Final   02/13/2019 1.85 (H) 0.76 - 1.46 ng/dL Final   01/16/2019 1.93 (H) 0.76 - 1.46 ng/dL Final   10/05/2018 3.97 (H) 0.76 - 1.46 ng/dL Final     Free T4   Date Value Ref Range Status   04/25/2023 1.09 0.90 - 1.70 ng/dL Final   02/06/2023 0.47 (L) 0.90 - 1.70 ng/dL Final   12/24/2021 1.06 0.76 - 1.46 ng/dL Final  "      Creatinine   Date Value Ref Range Status   05/15/2023 0.85 0.51 - 0.95 mg/dL Final   09/24/2019 0.76 0.52 - 1.04 mg/dL Final       Recent Labs   Lab Test 02/06/23  1409 12/24/21  1119 02/08/19  1141 02/06/15  0943   CHOL 193 231*   < > 183   HDL 57 57   < > 48*   * 155*   < > 113   TRIG 105 94   < > 108   CHOLHDLRATIO  --   --   --  3.8    < > = values in this interval not displayed.       No results found for: \"[id number]\", \"[id number]\", \"[id number]\"    I personally reviewed the patient's outside records from [employer] EMR. Summary of pertinent findings in HPI.    Impression / Plan     1. Graves disease    Diagnosed in 2018, was on high dose MMI then lost to follow up for 1 year. TFTs on 2/2023 showed hypothyroidism. She has been on MMI 5 mg since 2/2023 with most recent TSH and fT4 from 4/2023 within normal limits. She he has not had any further labs since that time.    - due for TFTs, will adjust MMI accordingly  - pending TFTs and further adjustment of MMI, can consider trial off methimazole at future visit     Previously iscussed Methimazole, medication used to treat Graves' Disease is usually well tolerated and safe that it has a rare and serious side effects.  are and serious side effects are  1.  Agranulocytosis: this is a loss of the white cell count that fights an infection.  This occurs approximately 1 in 200 people  2. liver problems this is even more rare than a granulocytosis but it can be very serious     Given her BRIDGET and smoking RAIA was discussed in the past and MMI was chosen.      Test and/or medications prescribed today:  Orders Placed This Encounter   Procedures    TSH    T4 free    T3 total    Thyroid stimulating immunoglobulin     Follow up: 6 month    This patient was discussed with the attending physician, Dr. Acosta.    Mark Pena MD  PGY-2, Internal Medicine-Pediatrics      Physician Attestation   I, Marleen Acosta MD, saw this patient with the resident and agree with " the resident's findings and plan of care as documented in the resident s note.      Marleen Acosta MD  Date of Service (when I saw the patient): Oct 19, 2023

## 2023-10-19 NOTE — LETTER
10/19/2023       RE: Tonya Rodriguez  [id number]  [facility] [address]     Dear Colleague,    Thank you for referring your patient, Tonya Rodriguez, to the [facility] [employer] [facility]. Please see a copy of my visit note below.    Endocrinology Clinic Visit  10/19/2023      Video-Visit Details    Type of service:  Video Visit/Phone Visit    Call started at 10/19/2023, 10:30 AM.  Call completed at 10/19/2023, 11:03 AM.    Originating Location (pt. Location): Home    Distant Location (provider location):  Off-site    Mode of Communication:  Video Conference via [email or web address], transitioned to [employer] given poor video connection quality    Physician has received verbal consent for a Video Visit from the patient? Yes    I spent a total of 30 minutes on the date of encounter reviewing medical records, evaluating the patient, coordinating care and documenting in the EHR, as detailed above.      NAME:  Tonya Rodriguez  PCP:  Robb Cabrera  MRN:  [id number]  Reason for Consult:  Graves disease  Requesting Provider:  No ref. provider found    Chief Complaint     Chief Complaint   Patient presents with    RECHECK    Video Visit       History of Present Illness     Tonya Rodriguez is a 67 year old female who is seen in video visit for Graves' Disease. She was previously followed by Dr. Figueroa, with their most recent visit in 1/2022.    She has a PMH significant for MS.  In 2018 she had progressive symptoms included weight loss ~35 pounds, insomnia, decreased appetite, palpitation. She was diagnosed with hyperthyroidism and started on MMI since then. She had elevated TSI on 1/2019 and 2/2019. She was taking 25 mg of methimazole daily and 25 mg of metoprolol daily.      In 1/2022 she saw Dr. Figueroa, at that time continuing methimazole 25 mg every day. I reviewed all her TFT in records. Most recent ones significant for:      "Latest Reference Range & Units 12/24/21 11:19 02/06/23 14:09   T4 Free 0.90 - 1.70 ng/dL 1.06 0.47 (L)   TSH 0.40 - 4.00 mU/L 0.48    TSH 0.30 - 4.20 uIU/mL  27.54 (H)        There is a note from Dr. Francis on 2/8/23 to call the patient and tell her :\"Please hold (don't take) methimazole for one week, then restart at 5 mg/day.  Repeat labs one week. I am submitting new Rx for methimazole 5 mg dose\"    She said at first she is taking 25 mg daily then said 2 tablets of what is prescribed. I asked to go and check her pill box and her bottle. She confirmed she is taking 5 mg daily since mid February 2023, which has remained consistent.    Today (10/19/23), she reports overall feeling well with no new symptoms or recent illnesses. No issues taking the 5 mg of methimazole and 25 mg of metoprolol daily. She has a chronic tremor and chronic constipation that is stable. No change in weight or unintentional weight loss. No palpitations or chest pain. No abdominal pain.    She said her eye symptoms are all related to her MS and not to her thyroid. Most recent appointment with ophthalmology was in 8/2023, with no medication changes recommended at that time and a reported stable exam. No change in proptosis.    Problem List     Patient Active Problem List   Diagnosis    Multiple sclerosis (H)    Allergic rhinitis    Other chronic allergic conjunctivitis    Dysphagia    Hyperlipidemia with target LDL less than 130    S/P revision of total hip  RIGHT    Osteoarthritis of hip    Major depression in complete remission (H)    Urinary incontinence    Restless leg    Herpes keratitis    Advanced directives, counseling/discussion    Family history of rheumatoid arthritis    Speech dysfluency    Self-catheterizes urinary bladder    Insomnia, unspecified type    Graves disease    Smoker    Screening for cervical cancer    Raynaud's syndrome without gangrene     Major depression in complete remission (H24)    Neurogenic bladder    " Tachycardia    Hyperthyroidism    Mixed hyperlipidemia        Medications     Current Outpatient Medications   Medication    gabapentin (NEURONTIN) 100 MG capsule    imipramine (TOFRANIL) 50 MG tablet    methimazole (TAPAZOLE) 5 MG tablet    metoprolol succinate ER (TOPROL XL) 25 MG 24 hr tablet    NIFEdipine ER (ADALAT CC) 30 MG 24 hr tablet    order for DME    oxybutynin ER (DITROPAN XL) 15 MG 24 hr tablet    simvastatin (ZOCOR) 20 MG tablet    timolol maleate (TIMOPTIC) 0.5 % ophthalmic solution    traZODone (DESYREL) 50 MG tablet    valACYclovir (VALTREX) 500 MG tablet    alendronate (FOSAMAX) 70 MG tablet    prednisoLONE acetate (PRED FORTE) 1 % ophthalmic suspension     No current facility-administered medications for this visit.        Allergies     Allergies   Allergen Reactions    Cipro [Ciprofloxacin] Rash     gets yeast infections - BAD with.    Lactulose GI Disturbance     Caused Vomiting       Medical / Surgical History     Past Medical History:   Diagnosis Date    Chest pain, unspecified 3/22/04    10/10 initially inseverity - responded to Aspirin and two doses of Nitroglycerin sublingual     Herpes simplex with other ophthalmic complications     on valtrex    Lipoma of unspecified site     Lower left suprascapular    Migraine, unspecified, with intractable migraine, so stated, without mention of status migrainosus 1/20/2007    midrin - about 30 pill a year     Past Surgical History:   Procedure Laterality Date    APPENDECTOMY  1980's    Retained suture    ARTHROPLASTY HIP  9/19/2012    Procedure: ARTHROPLASTY HIP;  Right Total Hip Minimally Invasive Surgery;  Surgeon: Devendra Douglas MD;  Location: [facility] OR    ARTHROSCOPY KNEE RT/LT  1989    Arthroscopy of the Left Knee    BUNIONECTOMY RT/LT      Bilateral bunionectomies x4 surgeries    DILATION AND CURETTAGE, OPERATIVE HYSTEROSCOPY WITH MORCELLATOR, COMBINED N/A 5/15/2019    Procedure: DILATION AND CURETTAGE,Hysteroscopy;  Surgeon: Lauren Moise,  MD;  Location: [facility] OR    EXCISE LESION TRUNK N/A 4/17/2015    Procedure: EXCISE LESION TRUNK;  Surgeon: Wander Gutierrez MD;  Location: [facility]    SURGICAL HISTORY OF -   1974, 1977    two normal deliveries    TUBAL LIGATION  1981-82       Social History     Social History     Socioeconomic History    Marital status:      Spouse name: Not on file    Number of children: Not on file    Years of education: Not on file    Highest education level: Not on file   Occupational History    Not on file   Tobacco Use    Smoking status: Every Day     Packs/day: 1.00     Years: 45.00     Additional pack years: 0.00     Total pack years: 45.00     Types: Cigarettes    Smokeless tobacco: Never   Vaping Use    Vaping Use: Never used   Substance and Sexual Activity    Alcohol use: No    Drug use: No    Sexual activity: Not Currently     Partners: Male   Other Topics Concern    Parent/sibling w/ CABG, MI or angioplasty before 65F 55M? Not Asked     Service No    Blood Transfusions No    Caffeine Concern Yes     Comment: 3 cups and 1 can    Occupational Exposure No    Hobby Hazards No    Sleep Concern No    Stress Concern No    Weight Concern No    Special Diet No    Back Care Yes     Comment: hurt years ago Jr in High School, low back    Exercise Yes    Bike Helmet No    Seat Belt Yes    Self-Exams Not Asked   Social History Narrative    Not on file     Social Determinants of Health     Financial Resource Strain: Not on file   Food Insecurity: Not on file   Transportation Needs: Not on file   Physical Activity: Not on file   Stress: Not on file   Social Connections: Not on file   Interpersonal Safety: Not on file   Housing Stability: Not on file       Family History     Family History   Problem Relation Age of Onset    Hypertension Mother     Heart Disease Mother     Migraines Mother     Cancer Father         lung    Heart Disease Father     Diabetes Father     Heart Disease Maternal Grandfather     Cancer Sister   "       cervical    Heart Disease Son         mummer    Alcohol/Drug Brother     Alcoholism Brother     Genitourinary Problems Brother         stones    Coronary Artery Disease Brother         injury \" maker\" tree fell on him    Glaucoma No family hx of     Macular Degeneration No family hx of     Aneurysm No family hx of     Brain Hemorrhage No family hx of     Seizure Disorder No family hx of     Cerebrovascular Disease No family hx of     Depression No family hx of        ROS     12 ROS completed, pertinent positive and negative in HPI    Physical Exam   Wt 68 kg (150 lb)   BMI 21.52 kg/m     GENERAL: Healthy, alert and no distress  EYES: Eyes grossly normal to inspection.  No discharge or erythema, or obvious scleral/conjunctival abnormalities.  RESP: No audible wheeze, cough, or visible cyanosis.  No visible retractions or increased work of breathing.    SKIN: Visible skin clear. No significant rash, abnormal pigmentation or lesions.  NEURO: Cranial nerves grossly intact.  Mentation and speech appropriate for age.  PSYCH: Mentation appears normal, affect normal/bright, judgement and insight intact, normal speech and appearance well-groomed.     Labs/Imaging     Pertinent Labs were reviewed and updated in EPIC and discussed briefly.  Radiology Results were  reviewed and updated in EPIC and discussed briefly.    Summary of recent findings:   Lab Results   Component Value Date    A1C 6.0 04/12/2011       TSH   Date Value Ref Range Status   04/25/2023 3.80 0.30 - 4.20 uIU/mL Final   02/06/2023 27.54 (H) 0.30 - 4.20 uIU/mL Final   12/24/2021 0.48 0.40 - 4.00 mU/L Final   12/02/2019 2.11 0.40 - 4.00 mU/L Final   05/09/2019 <0.01 (L) 0.40 - 4.00 mU/L Final   02/13/2019 <0.01 (L) 0.40 - 4.00 mU/L Final   01/16/2019 <0.01 (L) 0.40 - 4.00 mU/L Final   10/05/2018 <0.01 (L) 0.40 - 4.00 mU/L Final     T4 Free   Date Value Ref Range Status   12/02/2019 0.80 0.76 - 1.46 ng/dL Final   05/09/2019 1.57 (H) 0.76 - 1.46 ng/dL " "Final   02/13/2019 1.85 (H) 0.76 - 1.46 ng/dL Final   01/16/2019 1.93 (H) 0.76 - 1.46 ng/dL Final   10/05/2018 3.97 (H) 0.76 - 1.46 ng/dL Final     Free T4   Date Value Ref Range Status   04/25/2023 1.09 0.90 - 1.70 ng/dL Final   02/06/2023 0.47 (L) 0.90 - 1.70 ng/dL Final   12/24/2021 1.06 0.76 - 1.46 ng/dL Final       Creatinine   Date Value Ref Range Status   05/15/2023 0.85 0.51 - 0.95 mg/dL Final   09/24/2019 0.76 0.52 - 1.04 mg/dL Final       Recent Labs   Lab Test 02/06/23  1409 12/24/21  1119 02/08/19  1141 02/06/15  0943   CHOL 193 231*   < > 183   HDL 57 57   < > 48*   * 155*   < > 113   TRIG 105 94   < > 108   CHOLHDLRATIO  --   --   --  3.8    < > = values in this interval not displayed.       No results found for: \"[id number]\", \"[id number]\", \"[id number]\"    I personally reviewed the patient's outside records from [employer] EMR. Summary of pertinent findings in HPI.    Impression / Plan     1. Graves disease    Diagnosed in 2018, was on high dose MMI then lost to follow up for 1 year. TFTs on 2/2023 showed hypothyroidism. She has been on MMI 5 mg since 2/2023 with most recent TSH and fT4 from 4/2023 within normal limits. She he has not had any further labs since that time.    - due for TFTs, will adjust MMI accordingly  - pending TFTs and further adjustment of MMI, can consider trial off methimazole at future visit     Previously iscussed Methimazole, medication used to treat Graves' Disease is usually well tolerated and safe that it has a rare and serious side effects.  are and serious side effects are  1.  Agranulocytosis: this is a loss of the white cell count that fights an infection.  This occurs approximately 1 in 200 people  2. liver problems this is even more rare than a granulocytosis but it can be very serious     Given her BRIDGET and smoking RAIA was discussed in the past and MMI was chosen.      Test and/or medications prescribed today:  Orders Placed This Encounter   Procedures    TSH    T4 " free    T3 total    Thyroid stimulating immunoglobulin     Follow up: 6 month    This patient was discussed with the attending physician, Dr. Acosta.    Mark Pena MD  PGY-2, Internal Medicine-Pediatrics      Physician Attestation  I, Marleen Acosta MD, saw this patient with the resident and agree with the resident's findings and plan of care as documented in the resident s note.      Marleen Acosta MD  Date of Service (when I saw the patient): Oct 19, 2023

## 2023-10-19 NOTE — NURSING NOTE
Is the patient currently in the state of [place]? NO    Visit mode:VIDEO    If the visit is dropped, the patient can be reconnected by: VIDEO VISIT: Text to cell phone:   Telephone Information:   Mobile [phone]       Will anyone else be joining the visit? NO  (If patient encounters technical issues they should call [phone]    How would you like to obtain your AVS? MyChart    Are changes needed to the allergy or medication list? No    Reason for visit: RECHECK and Video Visit    Erna NICHOLS

## 2023-10-25 ENCOUNTER — TELEPHONE (OUTPATIENT)
Dept: ENDOCRINOLOGY | Facility: CLINIC | Age: 68
End: 2023-10-25
Payer: COMMERCIAL

## 2023-10-25 NOTE — TELEPHONE ENCOUNTER
Patient call:     Appointment type: RETURN ENDOCRINE  Provider:   Return date:SCHEDULE AROUND 04/2024  Speciality phone number: [phone]  Additional appointment(s) needed:LABS   Additional notes:  LVM AND SENT MYC X1   Fallon Gabriel on 10/25/2023 at 11:43 AM        SCHEDULE LABS PER  FIRST AVAILABLE     SCHEDULE RETURN ENDOCRINE WITH  AROUND 04/2024

## 2023-10-30 NOTE — TELEPHONE ENCOUNTER
Spoke with patient and scheduled the 6 mo follow-up appointment with Dr. Acosta for 4/11/24. Patient said they will call to schedule lab appointment later.

## 2023-12-28 ENCOUNTER — PATIENT OUTREACH (OUTPATIENT)
Dept: CARE COORDINATION | Facility: CLINIC | Age: 68
End: 2023-12-28
Payer: COMMERCIAL

## 2024-01-25 ENCOUNTER — PATIENT OUTREACH (OUTPATIENT)
Dept: CARE COORDINATION | Facility: CLINIC | Age: 69
End: 2024-01-25
Payer: COMMERCIAL

## 2024-02-13 ENCOUNTER — OFFICE VISIT (OUTPATIENT)
Dept: FAMILY MEDICINE | Facility: CLINIC | Age: 69
End: 2024-02-13
Payer: COMMERCIAL

## 2024-02-13 VITALS
HEIGHT: 70 IN | SYSTOLIC BLOOD PRESSURE: 108 MMHG | RESPIRATION RATE: 22 BRPM | HEART RATE: 105 BPM | BODY MASS INDEX: 22.76 KG/M2 | TEMPERATURE: 97.5 F | DIASTOLIC BLOOD PRESSURE: 60 MMHG | OXYGEN SATURATION: 95 % | WEIGHT: 159 LBS

## 2024-02-13 DIAGNOSIS — N39.0 ACUTE UTI (URINARY TRACT INFECTION): Primary | ICD-10-CM

## 2024-02-13 DIAGNOSIS — F32.5 MAJOR DEPRESSIVE DISORDER WITH SINGLE EPISODE, IN FULL REMISSION (H): ICD-10-CM

## 2024-02-13 DIAGNOSIS — N39.0 ACUTE UTI (URINARY TRACT INFECTION): ICD-10-CM

## 2024-02-13 DIAGNOSIS — G35 MULTIPLE SCLEROSIS (H): ICD-10-CM

## 2024-02-13 DIAGNOSIS — R30.0 DYSURIA: ICD-10-CM

## 2024-02-13 LAB
ALBUMIN UR-MCNC: >=300 MG/DL
APPEARANCE UR: ABNORMAL
BACTERIA #/AREA URNS HPF: ABNORMAL /HPF
BILIRUB UR QL STRIP: NEGATIVE
COLOR UR AUTO: YELLOW
GLUCOSE UR STRIP-MCNC: NEGATIVE MG/DL
HGB UR QL STRIP: ABNORMAL
KETONES UR STRIP-MCNC: NEGATIVE MG/DL
LEUKOCYTE ESTERASE UR QL STRIP: ABNORMAL
NITRATE UR QL: POSITIVE
PH UR STRIP: 8.5 [PH] (ref 5–7)
RBC #/AREA URNS AUTO: ABNORMAL /HPF
SP GR UR STRIP: 1.02 (ref 1–1.03)
UROBILINOGEN UR STRIP-ACNC: 0.2 E.U./DL
WBC #/AREA URNS AUTO: >100 /HPF
WBC CLUMPS #/AREA URNS HPF: PRESENT /HPF

## 2024-02-13 PROCEDURE — 87086 URINE CULTURE/COLONY COUNT: CPT | Performed by: FAMILY MEDICINE

## 2024-02-13 PROCEDURE — 87088 URINE BACTERIA CULTURE: CPT | Performed by: FAMILY MEDICINE

## 2024-02-13 PROCEDURE — 99214 OFFICE O/P EST MOD 30 MIN: CPT | Performed by: FAMILY MEDICINE

## 2024-02-13 PROCEDURE — 87186 SC STD MICRODIL/AGAR DIL: CPT | Performed by: FAMILY MEDICINE

## 2024-02-13 PROCEDURE — 81001 URINALYSIS AUTO W/SCOPE: CPT | Performed by: FAMILY MEDICINE

## 2024-02-13 RX ORDER — SULFAMETHOXAZOLE/TRIMETHOPRIM 800-160 MG
1 TABLET ORAL 2 TIMES DAILY
Qty: 14 TABLET | Refills: 0 | Status: SHIPPED | OUTPATIENT
Start: 2024-02-13 | End: 2024-04-09

## 2024-02-13 RX ORDER — RESPIRATORY SYNCYTIAL VIRUS VACCINE 120MCG/0.5
0.5 KIT INTRAMUSCULAR ONCE
Qty: 1 EACH | Refills: 0 | Status: CANCELLED | OUTPATIENT
Start: 2024-02-13 | End: 2024-02-13

## 2024-02-13 RX ORDER — SULFAMETHOXAZOLE/TRIMETHOPRIM 800-160 MG
1 TABLET ORAL 2 TIMES DAILY
Qty: 6 TABLET | Refills: 0 | Status: SHIPPED | OUTPATIENT
Start: 2024-02-13 | End: 2024-02-13

## 2024-02-13 ASSESSMENT — PATIENT HEALTH QUESTIONNAIRE - PHQ9
SUM OF ALL RESPONSES TO PHQ QUESTIONS 1-9: 6
10. IF YOU CHECKED OFF ANY PROBLEMS, HOW DIFFICULT HAVE THESE PROBLEMS MADE IT FOR YOU TO DO YOUR WORK, TAKE CARE OF THINGS AT HOME, OR GET ALONG WITH OTHER PEOPLE: SOMEWHAT DIFFICULT
SUM OF ALL RESPONSES TO PHQ QUESTIONS 1-9: 6

## 2024-02-13 ASSESSMENT — PAIN SCALES - GENERAL: PAINLEVEL: MILD PAIN (2)

## 2024-02-13 NOTE — PROGRESS NOTES
"  Assessment & Plan     Acute UTI (urinary tract infection)  At higher risk due to self catheterization due to her MS  Will treat for a week due to complicated UTI  Culture pending  - sulfamethoxazole-trimethoprim (BACTRIM DS) 800-160 MG tablet; Take 1 tablet by mouth 2 times daily for 7 days    Dysuria     - UA Macroscopic with reflex to Microscopic and Culture - Clinic Collect  - Urine Microscopic Exam  - Urine Culture    Multiple sclerosis (H)  Increases her risk in this situation due to self catheterization    Major depressive disorder with single episode, in full remission (H24)   Continue trazodone  stable                Subjective   Tonya is a 68 year old, presenting for the following health issues:  UTI        2/13/2024    10:09 AM   Additional Questions   Roomed by Lorena RAINES CMA   Accompanied by Self     HPI       Genitourinary - Female  Onset/Duration: 3 days  Description:   Painful urination (Dysuria): YES           Frequency: No  Blood in urine (Hematuria): No  Delay in urine (Hesitency): No  Intensity: moderate  Progression of Symptoms:  same  Accompanying Signs & Symptoms:  Fever/chills: No  Flank pain: No  Nausea and vomiting: No  Vaginal symptoms: discharge  Abdominal/Pelvic Pain: No  History:   History of frequent UTI s: YES  History of kidney stones: No  Sexually Active: No  Possibility of pregnancy: No  Precipitating or alleviating factors: None  Therapies tried and outcome: None        Review of Systems  Constitutional, HEENT, cardiovascular, pulmonary, gi and gu systems are negative, except as otherwise noted.      Objective    /60   Pulse 105   Temp 97.5  F (36.4  C) (Tympanic)   Resp 22   Ht 1.778 m (5' 10\")   Wt 72.1 kg (159 lb)   SpO2 95%   BMI 22.81 kg/m    Body mass index is 22.81 kg/m .  Physical Exam   GENERAL: alert and no distress   (female): normal female external genitalia, normal urethral meatus , and thick smegma type discharge in the crease between the left labia " majora and minora.  This was removed with baby wipes and skin appears normal.      Denae Knapp M.D.          Signed Electronically by: Denae Knapp MD

## 2024-02-13 NOTE — TELEPHONE ENCOUNTER
RN cued up 7 day supply as chart note indicates from today for provider to please review.    Lucille Lopez RN on 2/13/2024 at 11:39 AM

## 2024-02-13 NOTE — TELEPHONE ENCOUNTER
RN called pharmacy and they have received the new prescription.    Lucille Lopez RN on 2/13/2024 at 11:57 AM

## 2024-02-13 NOTE — TELEPHONE ENCOUNTER
Pharmacy called to clarify the Bactrim DS rx that was sent over.   sulfamethoxazole-trimethoprim (BACTRIM DS) 800-160 MG tablet Take 1 tablet by mouth 2 times daily for 7 days 6 tablet     If provider wants it filled for 7 days or 3 days? A quantity of 6 was only sent.     Call back #[phone]    Routed to provider and care team.    Swetha Cárdenas RN on 2/13/2024 at 11:14 AM

## 2024-02-13 NOTE — TELEPHONE ENCOUNTER
Yes, as per note, 7 day supply was intended.    New prescription sent to pharmacy    Denae Knapp M.D.

## 2024-02-15 LAB
BACTERIA UR CULT: ABNORMAL
BACTERIA UR CULT: ABNORMAL

## 2024-03-15 DIAGNOSIS — R32 URINARY INCONTINENCE, UNSPECIFIED TYPE: ICD-10-CM

## 2024-03-15 DIAGNOSIS — G47.00 INSOMNIA, UNSPECIFIED TYPE: ICD-10-CM

## 2024-03-15 NOTE — TELEPHONE ENCOUNTER
Refill request for: trazodone 50mg   Directions: Take 1 tablet (50 mg) by mouth nightly as needed for sleep     Refill request for: oxybutynin ER (DITROPAN XL) 15 MG 24 hr tablet    Directions: TAKE 2 TABLETS BY MOUTH AT BEDTIME     LOV: 04/07/23  NOV: 04/09/24    This is a pt of Dr. Tabares. He is out of the office until 03/25/24. Can you refill in his absence?    90 day supply with 0 refills Medication T'd for review and signature    Edith Em LPN on 3/15/2024 at 2:43 PM

## 2024-03-16 RX ORDER — TRAZODONE HYDROCHLORIDE 50 MG/1
50 TABLET, FILM COATED ORAL
Qty: 90 TABLET | Refills: 0 | Status: SHIPPED | OUTPATIENT
Start: 2024-03-16 | End: 2024-06-07

## 2024-03-16 RX ORDER — OXYBUTYNIN CHLORIDE 15 MG/1
30 TABLET, EXTENDED RELEASE ORAL AT BEDTIME
Qty: 180 TABLET | Refills: 0 | Status: SHIPPED | OUTPATIENT
Start: 2024-03-16 | End: 2024-06-07

## 2024-03-30 ENCOUNTER — HEALTH MAINTENANCE LETTER (OUTPATIENT)
Age: 69
End: 2024-03-30

## 2024-03-31 DIAGNOSIS — R00.0 TACHYCARDIA: ICD-10-CM

## 2024-03-31 DIAGNOSIS — E05.90 HYPERTHYROIDISM: ICD-10-CM

## 2024-04-01 RX ORDER — METOPROLOL SUCCINATE 25 MG/1
TABLET, EXTENDED RELEASE ORAL
Qty: 90 TABLET | Refills: 0 | Status: SHIPPED | OUTPATIENT
Start: 2024-04-01 | End: 2024-09-11

## 2024-04-01 NOTE — TELEPHONE ENCOUNTER
I saw patient one time.    Refilled medication but she needs to choose a new PCP and establish care with someone new.    Denae Knapp M.D.

## 2024-04-08 NOTE — PROGRESS NOTES
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  Blurry vision left eye than right chronic    Follows with endocrinology for her thyroid disease  Was off of thyroid meds in the past  Had labs as below primary readjusted thyroid medication  Laboratory data shows elevated TSH  2/6/2023,   TSH                      27.54              A.  Secondary progressive MS       Original diagnosis of MS late 1980s, presented with gait difficulties falling bladder problems.       Original work-up by Dr. Goins with lumbar puncture positive for MS       Treated with Copaxone in the past had some injection site irritation.       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 Luis Antonio Bray, recommended a trial of Cytoxan patient chose not to pursue that         Followed by Dr. Mukherjee from [id number]        Considered Tecfidera in 2015.         now off all immunosuppressive therapies for couple of years      Patient passed her swallow study in August 2021  Still need to be careful  Lives on 1 level now  In past staircase had motorized lift seat that she used ( moved )  Has a walk-in shower with hand-held sprayer,grab bar  toliet high , with rails  No longer drives  Has a hospital bed so that she can sit up in bed and do some crocheting      Did get a bed that elevates so that it is easier for her to sleep at night  Uses oxybutynin at nighttime so she does not have to get up to urinate frequently did talk about bedside commode in the future if necessary  Significant urinary difficulties follows with urology is supposed to self cath every 3-4 hours    Has a motorized scooter to get around inside the home    When she goes to appointments though this is too heavy to transport to visits such as medical visits    Will write a prescription for medical durable equipment  Lightweight collapsible wheelchair self propel  For use to get to medical appointments  Diagnosis multiple sclerosis  Severe ataxia high risk for falls and injury        B.  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  Has had hyperthyroidism in the past with proptosis  Has difficulty with swallowing coughs when she eats certain foods such as lettuce or meats that are hard to chew do not go down as well  Significant ataxia with left-sided weakness and clumsiness greater than right  Has a lift on the staircase  Significant urinary difficulties follows with urology is supposed to self cath every 3-4 hours  No longer drives  At home lives with her  "  Staircase has a motorized lift seat that she uses  As a walk-in shower with a shower bar  Has a hand-held sprayer  Uses a 4 wheeled walker with seat and hand brakes  Does have a powered motorized scooter but it is too heavy to transport outside the home for visits  Question whether they should switch out the seat in the shower so that she can use it as she thinks it is too small  Does follow with urology for her urinary retention and self caths about every 4 hours  Poor vision which also affects balance is going to be seeing the eye doctor shortly        Neurologic history summary  Diagnosed with multiple sclerosis in the late .  Presented with difficulty with gait falling down and bladder problems.  Work-up by Dr. Goins with lumbar puncture positive for MS  Treated with Copaxone in the past had some injection site irritation.  In  had reports of some memory difficulties  Has had significant bladder control problems  Trouble with dysarthria and speech  Also had significant difficulty with fatigue  Has been totally disabled since   Around  seen at the [facility] Dr. Luis Antonio Bray, recommended a trial of Cytoxan patient chose not to pursue that  Followed by Dr. Mukherjee from [id number]  Consider Tecfidera in       Past medical history  Multiple sclerosis onset   Left eye herpetic infection with visual loss around   Urinary incontinence  Insomnia  Hyperlipidemia  Hyper thyroidism/proptosis  Restless leg syndrome  Depression  Low back pain      Habits  History of smoking (has cut down to half a pack per day which \"is good for her\")  Does not use alcohol  Totally disabled since   Past driving restrictions, no freeway driving, no rush hour driving last evaluated 2017  No longer drives      Family history  Father with lung cancer and MI  at age 66  Mother with MI and high blood pressure  at age 73  Brother  in motor vehicle accident  Sister with cervical " cancer      Work-up  MRI scan brain June 2005 increasing plaque load compared to November 1999 no active plaques  MRI brain January 2007 moderate white matter changes stable compared to 2005  MRI brain November 2014, multiple new chronic lesions compared to previous scan no active lesions seen (compared to 2010)  MRI scan brain November 2015  A.  Diffuse volume loss and cerebral white matter changes consistent with multiple sclerosis  B.  No active lesions seen on contrast scan  Swallow study 8/13/2021  No aspiration  CT chest 8/21/2023 see official report (follows with pulmonology)  1. Slightly increased size of multiple pulmonary nodules throughout  the lungs. For example, increased in size groundglass nodule in the  left upper lobe. Minimal increase in spiculated anterolateral left  upper lobe nodule. Consider PET/CT or tissue sampling for further  evaluation. Stable right lower lobe solid nodules. Stable right middle  lobe nodule.  2. Stable bilateral adrenal adenomas and cholecystitis.    MoCA  8/4/2021,       25 out of 30    Laboratory data review                               2/2023 5/2023  NA/K                                     143/4.4  BUN/Cr                                 21.2/0.85  GLU                                      100  HDL/LDL              57/115  TSH                      27.54         3.8    Exam    Review of system  Pertinent positives and negatives  No headache no chest pain or shortness of breath no nausea vomiting no fever chills no diarrhea    No true diplopia more of blurry vision  Left eye blurrier than right chronic  Slightly dysarthric scanning speech  Some past history of dysphagia but not worse careful when she eats  Clumsiness of her limbs left greater than right legs worse than arms  Ataxia needs to use the walker    Urinary incontinence with retention does self cath  Is not Ditropan    Otherwise review of systems negative    General exam  Blood pressure 129/81, pulse  79  HEENT significant for proptosis, blurry vision left greater than right chronic  Lungs decreased breath sounds bilaterally history of smoker possible COPD no wheezing  Heart rate regular  Abdomen soft positive bowel sounds  Symmetrical pulses  No edema the feet    Neurologic exam  Alert oriented x3  Prosody of speech dysarthric halting pattern  Normal naming  Normal comprehension  Normal repetition  No aphasia  No neglect    Formal Greer memory testing 25 out of 30 in the past    Knows some current events knows the president and the         Cranial nerves II through XII significant for  Unreactive left pupil  Ophthalmoplegia with poor eye saccades (also has proptosis)  Blurry vision worse in the left than the eye with monocular testing  No discrete visual field cut noted  Dysarthric speech tongue twisters thick  Face relatively symmetrical though  Dysarthric speech  Unreactive left pupil optic pallor left greater than right          Upper extremities  Clumsiness worse on the left than the right  Dysmetria with finger-nose  No specific drift    Lower extremities  Weakness and slowness of rapid altering movements worse on the left compared to the right  Left iliopsoas slightly weaker than the right  Increased tone in the left lower extremity compared to the right but present bilaterally    Clumsy dysmetria bilateral lower extremities worse on the left than the right    Reflexes  Brisk in the upper extremities  Brisk at the knees with a little bit of overflow  Absent at the ankles  Toes equivocal  Negative Azevedo reflex    Gait  Needs both hands to stand up  With the four-wheel walker with hand brake has a spastic ataxic gait  Drags her left leg more so than the right  Turns are slow            Assessment/plan    1.  Secondary progressive MS onset late 1980s with progressive MRI scan changes as above         Currently off of immunosuppression    2.  History of left eye herpes infection with visual  loss/hyperthyroidism with proptosis    3.  Dysarthria/visual changes/gait difficulties paresthesias/bladder difficulties/mood difficulties/memory difficulties, as part of her MS and medical issues    4.  Urinary dysfunction from MS follows with urology self caths every 4 to every 3 hours dexterity in hands enough to do the self cath still    5.  Decreased breath sounds history of continuing to smoke COPD discussed with patient cutting down or quitting smoking        Diagnosis  Secondary progressive MS  No longer on immune modulating medications  Originally diagnosed in the late 1980s    Plan  In the future if having problems could consider:    Formal physical therapy eval to see if we can maximize gait safety  Formal video swallow with speech path to see if we can adjust diet to avoid pneumonias  Continue to follow with urology with instructions for self cathing every 3-every 4 hours    Current medications filled      Neurontin 100 mg tab, 2 tabs nightly neurology now     Oxybutynin ER 15 mg tab, 2 tabs nightly neurology now     Trazodone 50 mg nightly neurology now       Imipramine 50 mg tab, 1 tab nightly primary MD    See formal med list for other meds    Reviewed gait safety    Follow-up in 1 year    Total care time today 30 minutes    As part of visit today  Reviewed EMR notes  Reviewed gait safety  Reviewed living situation  Reviewed laboratory data  Filled medications

## 2024-04-09 ENCOUNTER — OFFICE VISIT (OUTPATIENT)
Dept: NEUROLOGY | Facility: CLINIC | Age: 69
End: 2024-04-09
Payer: COMMERCIAL

## 2024-04-09 VITALS
HEART RATE: 79 BPM | SYSTOLIC BLOOD PRESSURE: 129 MMHG | BODY MASS INDEX: 23.05 KG/M2 | DIASTOLIC BLOOD PRESSURE: 81 MMHG | WEIGHT: 161 LBS | HEIGHT: 70 IN

## 2024-04-09 DIAGNOSIS — G25.81 RESTLESS LEG: ICD-10-CM

## 2024-04-09 DIAGNOSIS — G35 MULTIPLE SCLEROSIS (H): Primary | ICD-10-CM

## 2024-04-09 PROCEDURE — 99214 OFFICE O/P EST MOD 30 MIN: CPT | Performed by: PSYCHIATRY & NEUROLOGY

## 2024-04-09 RX ORDER — GABAPENTIN 100 MG/1
200 CAPSULE ORAL AT BEDTIME
Qty: 180 CAPSULE | Refills: 3 | Status: SHIPPED | OUTPATIENT
Start: 2024-04-09

## 2024-04-09 NOTE — LETTER
4/9/2024         RE: Tonya Rodriguez  [address]  [facility] [address]        Dear Colleague,    Thank you for referring your patient, Tonya Rodriguez, to the [facility] [place]. Please see a copy of my visit note below.    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  Blurry vision left eye than right chronic    Follows with endocrinology for her thyroid disease  Was off of thyroid meds in the past  Had labs as below primary readjusted thyroid medication  Laboratory data shows elevated TSH  2/6/2023,   TSH                      27.54              A.  Secondary progressive MS       Original diagnosis of MS late 1980s, presented with gait difficulties falling bladder problems.       Original work-up by Dr. Goins with lumbar puncture positive for MS       Treated with Copaxone in the past had some injection site irritation.       In 2005 had reports of some memory difficulties       Has had significant bladder control problems        Trouble with dysarthria and speech        Also had significant difficulty with fatigue        Does have some difficulty with diplopia       Has left eye blurriness from herpes  ophthalmicus and MS        Has had hyperthyroidism in the past with proptosis         Has been totally disabled since 2000          Around 2009 seen at the [facility] Dr. Luis Antonio Bray, recommended a trial of Cytoxan patient chose not to pursue that        Followed by Dr. Mukherjee from [id number]        Considered Tecfidera in 2015.         now off all immunosuppressive therapies for couple of years      Patient passed her swallow study in August 2021  Still need to be careful  Lives on 1 level now  In past staircase had motorized lift seat that she used ( moved )  Has a walk-in shower with hand-held sprayer,grab bar  toliet high , with rails  No longer drives  Has a hospital bed so that she can sit up in bed and do some crocheting      Did get a bed that elevates so that it is easier for her to sleep at night  Uses oxybutynin at nighttime so she does not have to get up to urinate frequently did talk about bedside commode in the future if necessary  Significant urinary difficulties follows with urology is supposed to self cath every 3-4 hours    Has a motorized scooter to get around inside the home    When she goes to appointments though this is too heavy to transport to visits such as medical visits    Will write a prescription for medical durable equipment  Lightweight collapsible wheelchair self propel  For use to get to medical appointments  Diagnosis multiple sclerosis  Severe ataxia high risk for falls and injury        B.  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  Has had hyperthyroidism in the past with proptosis  Has difficulty with swallowing coughs when she eats certain foods such as lettuce or meats that are hard to chew do not go  "down as well  Significant ataxia with left-sided weakness and clumsiness greater than right  Has a lift on the staircase  Significant urinary difficulties follows with urology is supposed to self cath every 3-4 hours  No longer drives  At home lives with her   Staircase has a motorized lift seat that she uses  As a walk-in shower with a shower bar  Has a hand-held sprayer  Uses a 4 wheeled walker with seat and hand brakes  Does have a powered motorized scooter but it is too heavy to transport outside the home for visits  Question whether they should switch out the seat in the shower so that she can use it as she thinks it is too small  Does follow with urology for her urinary retention and self caths about every 4 hours  Poor vision which also affects balance is going to be seeing the eye doctor shortly        Neurologic history summary  Diagnosed with multiple sclerosis in the late 1980s.  Presented with difficulty with gait falling down and bladder problems.  Work-up by Dr. Goins with lumbar puncture positive for MS  Treated with Copaxone in the past had some injection site irritation.  In 2005 had reports of some memory difficulties  Has had significant bladder control problems  Trouble with dysarthria and speech  Also had significant difficulty with fatigue  Has been totally disabled since 2000  Around 2009 seen at the [facility] Dr. Luis Antonio Bray, recommended a trial of Cytoxan patient chose not to pursue that  Followed by Dr. Mukherjee from [id number]  Consider Tecfidera in 2015      Past medical history  Multiple sclerosis onset 1980s  Left eye herpetic infection with visual loss around 2008  Urinary incontinence  Insomnia  Hyperlipidemia  Hyper thyroidism/proptosis  Restless leg syndrome  Depression  Low back pain      Habits  History of smoking (has cut down to half a pack per day which \"is good for her\")  Does not use alcohol  Totally disabled since 2000  Past driving restrictions, no freeway " driving, no rush hour driving last evaluated   No longer drives      Family history  Father with lung cancer and MI  at age 66  Mother with MI and high blood pressure  at age 73  Brother  in motor vehicle accident  Sister with cervical cancer      Work-up  MRI scan brain 2005 increasing plaque load compared to 1999 no active plaques  MRI brain 2007 moderate white matter changes stable compared to   MRI brain 2014, multiple new chronic lesions compared to previous scan no active lesions seen (compared to )  MRI scan brain 2015  A.  Diffuse volume loss and cerebral white matter changes consistent with multiple sclerosis  B.  No active lesions seen on contrast scan  Swallow study 2021  No aspiration  CT chest 2023 see official report (follows with pulmonology)  1. Slightly increased size of multiple pulmonary nodules throughout  the lungs. For example, increased in size groundglass nodule in the  left upper lobe. Minimal increase in spiculated anterolateral left  upper lobe nodule. Consider PET/CT or tissue sampling for further  evaluation. Stable right lower lobe solid nodules. Stable right middle  lobe nodule.  2. Stable bilateral adrenal adenomas and cholecystitis.    MoCA  2021,       25 out of 30    Laboratory data review                               2023  NA/K                                     143/4.4  BUN/Cr                                 21.2/0.85  GLU                                      100  HDL/LDL              57/115  TSH                      27.54         3.8    Exam    Review of system  Pertinent positives and negatives  No headache no chest pain or shortness of breath no nausea vomiting no fever chills no diarrhea    No true diplopia more of blurry vision  Left eye blurrier than right chronic  Slightly dysarthric scanning speech  Some past history of dysphagia but not worse careful when she eats  Clumsiness of  her limbs left greater than right legs worse than arms  Ataxia needs to use the walker    Urinary incontinence with retention does self cath  Is not Ditropan    Otherwise review of systems negative    General exam  Blood pressure 129/81, pulse 79  HEENT significant for proptosis, blurry vision left greater than right chronic  Lungs decreased breath sounds bilaterally history of smoker possible COPD no wheezing  Heart rate regular  Abdomen soft positive bowel sounds  Symmetrical pulses  No edema the feet    Neurologic exam  Alert oriented x3  [place]ody of speech dysarthric halting pattern  Normal naming  Normal comprehension  Normal repetition  No aphasia  No neglect    Formal [place] memory testing 25 out of 30 in the past    Knows some current events knows the president and the         Cranial nerves II through XII significant for  Unreactive left pupil  Ophthalmoplegia with poor eye saccades (also has proptosis)  Blurry vision worse in the left than the eye with monocular testing  No discrete visual field cut noted  Dysarthric speech tongue twisters thick  Face relatively symmetrical though  Dysarthric speech  Unreactive left pupil optic pallor left greater than right          Upper extremities  Clumsiness worse on the left than the right  Dysmetria with finger-nose  No specific drift    Lower extremities  Weakness and slowness of rapid altering movements worse on the left compared to the right  Left iliopsoas slightly weaker than the right  Increased tone in the left lower extremity compared to the right but present bilaterally    Clumsy dysmetria bilateral lower extremities worse on the left than the right    Reflexes  Brisk in the upper extremities  Brisk at the knees with a little bit of overflow  Absent at the ankles  Toes equivocal  Negative Azevedo reflex    Gait  Needs both hands to stand up  With the four-wheel walker with hand brake has a spastic ataxic gait  Drags her left leg more so than the  right  Turns are slow            Assessment/plan    1.  Secondary progressive MS onset late 1980s with progressive MRI scan changes as above         Currently off of immunosuppression    2.  History of left eye herpes infection with visual loss/hyperthyroidism with proptosis    3.  Dysarthria/visual changes/gait difficulties paresthesias/bladder difficulties/mood difficulties/memory difficulties, as part of her MS and medical issues    4.  Urinary dysfunction from MS follows with urology self caths every 4 to every 3 hours dexterity in hands enough to do the self cath still    5.  Decreased breath sounds history of continuing to smoke COPD discussed with patient cutting down or quitting smoking        Diagnosis  Secondary progressive MS  No longer on immune modulating medications  Originally diagnosed in the late 1980s    Plan  In the future if having problems could consider:    Formal physical therapy eval to see if we can maximize gait safety  Formal video swallow with speech path to see if we can adjust diet to avoid pneumonias  Continue to follow with urology with instructions for self cathing every 3-every 4 hours    Current medications filled      Neurontin 100 mg tab, 2 tabs nightly neurology now     Oxybutynin ER 15 mg tab, 2 tabs nightly neurology now     Trazodone 50 mg nightly neurology now       Imipramine 50 mg tab, 1 tab nightly primary MD    See formal med list for other meds    Reviewed gait safety    Follow-up in 1 year    Total care time today 30 minutes    As part of visit today  Reviewed EMR notes  Reviewed gait safety  Reviewed living situation  Reviewed laboratory data  Filled medications          Again, thank you for allowing me to participate in the care of your patient.        Sincerely,        shabbir Tabares MD

## 2024-04-09 NOTE — NURSING NOTE
Chief Complaint   Patient presents with    MS     Annual follow up. She states she always has to use her walker now. Denies any new concerns.     Edith Em LPN on 4/9/2024 at 1:52 PM

## 2024-04-11 ENCOUNTER — VIRTUAL VISIT (OUTPATIENT)
Dept: ENDOCRINOLOGY | Facility: CLINIC | Age: 69
End: 2024-04-11
Payer: COMMERCIAL

## 2024-04-11 DIAGNOSIS — E05.00 GRAVES DISEASE: ICD-10-CM

## 2024-04-11 PROCEDURE — 99213 OFFICE O/P EST LOW 20 MIN: CPT | Mod: 95 | Performed by: STUDENT IN AN ORGANIZED HEALTH CARE EDUCATION/TRAINING PROGRAM

## 2024-04-11 PROCEDURE — G2211 COMPLEX E/M VISIT ADD ON: HCPCS | Mod: 95 | Performed by: STUDENT IN AN ORGANIZED HEALTH CARE EDUCATION/TRAINING PROGRAM

## 2024-04-11 ASSESSMENT — PAIN SCALES - GENERAL: PAINLEVEL: NO PAIN (0)

## 2024-04-11 NOTE — NURSING NOTE
Is the patient currently in the state of [place]? YES    Visit mode:VIDEO    If the visit is dropped, the patient can be reconnected by: VIDEO VISIT: Text to cell phone:   Telephone Information:   Mobile [phone]       Will anyone else be joining the visit? NO  (If patient encounters technical issues they should call [phone]    How would you like to obtain your AVS? MyChart    Are changes needed to the allergy or medication list? No    Are refills needed on medications prescribed by this physician?     Reason for visit: RECHECK Shelby Kocher VVF

## 2024-04-11 NOTE — LETTER
4/11/2024       RE: Tonya Rodriguez  [address]  [facility] [address]     Dear Colleague,    Thank you for referring your patient, Tonya Rodriguez, to the [facility]CRINOLOG[facility] [place] at [facility]. Please see a copy of my visit note below.    Endocrinology Clinic Visit        Video-Visit Details    Type of service:  Video Visit/Phone Visit  Joined the call at 4/11/2024, 9:07:34 am.  Left the call at 4/11/2024, 9:18:08 am.    Originating Location (pt. Location): Home    Distant Location (provider location):  Off-site    Mode of Communication:  Video Conference via Vrvana, transitioned to Doximity given poor video connection quality    Physician has received verbal consent for a Video Visit from the patient? Yes    I spent a total of 20 minutes on the date of encounter reviewing medical records, evaluating the patient, coordinating care and documenting in the EHR, as detailed above.      NAME:  Tonya Rodriguez  PCP:  Robb Cabrera  MRN:  [id number]  Reason for Consult:  Graves disease  Requesting Provider:  No ref. provider found    Chief Complaint     Chief Complaint   Patient presents with    RECHECK       History of Present Illness     Tonya Rodriguez is a 67 year old female who is seen in video visit for Graves' Disease. She was previously followed by Dr. Figueroa, with their most recent visit in 1/2022. She established with me 4/2023. Last seen 10/2023.    She has a PMH significant for MS.  In 2018 she had progressive symptoms included weight loss ~35 pounds, insomnia, decreased appetite, palpitation. She was diagnosed with hyperthyroidism and started on MMI since then. She had elevated TSI on 1/2019 and 2/2019. She was taking 25 mg of methimazole daily and 25 mg of metoprolol daily.      In 1/2022 she saw Dr. Figueroa, at that time continuing methimazole 25 mg every day. I reviewed all her TFT in records. Most recent  "ones significant for:     Latest Reference Range & Units 12/24/21 11:19 02/06/23 14:09   T4 Free 0.90 - 1.70 ng/dL 1.06 0.47 (L)   TSH 0.40 - 4.00 mU/L 0.48    TSH 0.30 - 4.20 uIU/mL  27.54 (H)        There is a note from Dr. Francis on 2/8/23 to call the patient and tell her :\"Please hold (don't take) methimazole for one week, then restart at 5 mg/day.  Repeat labs one week. I am submitting new Rx for methimazole 5 mg dose\"    She said at first she is taking 25 mg daily then said 2 tablets of what is prescribed. I asked to go and check her pill box and her bottle. She confirmed she was on 5 mg daily restarted mid February 2023.  On 10/19/23, she reported overall feeling well with no new symptoms or recent illnesses. No issues taking the 5 mg of methimazole and 25 mg of metoprolol daily. She has a chronic tremor and chronic constipation that is stable. No change in weight or unintentional weight loss. No palpitations or chest pain. No abdominal pain.    She said her eye symptoms are all related to her MS and not to her thyroid. Most recent appointment with ophthalmology was in 8/2023, with no medication changes recommended at that time and a reported stable exam. No change in proptosis.      She did not complete any labs since last year, on 4/25/23 with normal TFT. She reported today she ran out of MMI, unsure for how long she has been off. She said for sure few month.    Problem List     Patient Active Problem List   Diagnosis    Multiple sclerosis (H)    Allergic rhinitis    Other chronic allergic conjunctivitis    Dysphagia    Hyperlipidemia with target LDL less than 130    S/P revision of total hip  RIGHT    Osteoarthritis of hip    Major depression in complete remission (H)    Urinary incontinence    Restless leg    Herpes keratitis    Advanced directives, counseling/discussion    Family history of rheumatoid arthritis    Speech dysfluency    Self-catheterizes urinary bladder    Insomnia, unspecified type    " Graves disease    Smoker    Screening for cervical cancer    Raynaud's syndrome without gangrene     Major depression in complete remission (H24)    Neurogenic bladder    Tachycardia    Hyperthyroidism    Mixed hyperlipidemia        Medications     Current Outpatient Medications   Medication Sig Dispense Refill    alendronate (FOSAMAX) 70 MG tablet Take 1 tablet (70 mg) by mouth every 7 days 13 tablet 3    gabapentin (NEURONTIN) 100 MG capsule Take 2 capsules (200 mg) by mouth at bedtime 180 capsule 3    imipramine (TOFRANIL) 50 MG tablet TAKE 1 TABLET BY MOUTH AT BEDTIME GENERIC EQUIVALENT FOR TOFRANIL 90 tablet 3    methimazole (TAPAZOLE) 5 MG tablet Take 1 tablet (5 mg) by mouth daily 30 tablet 3    metoprolol succinate ER (TOPROL XL) 25 MG 24 hr tablet ** VISIT DUE/LAST FILL ** TAKE 1 TABLET BY MOUTH DAILY GENERIC EQUIVALENT FOR TOPROL XL 90 tablet 0    NIFEdipine ER (ADALAT CC) 30 MG 24 hr tablet Take 1 tablet (30 mg) by mouth daily 90 tablet 3    order for DME Equipment being ordered: urinary catheter 6 times daily 540 catheter 3    oxyBUTYnin ER (DITROPAN XL) 15 MG 24 hr tablet Take 2 tablets (30 mg) by mouth at bedtime 180 tablet 0    prednisoLONE acetate (PRED FORTE) 1 % ophthalmic suspension       simvastatin (ZOCOR) 20 MG tablet Take 1 tablet (20 mg) by mouth At Bedtime 90 tablet 3    traZODone (DESYREL) 50 MG tablet Take 1 tablet (50 mg) by mouth nightly as needed for sleep 90 tablet 0    valACYclovir (VALTREX) 500 MG tablet Take 1 tablet (500 mg) by mouth daily Will prescribe only for 2 months to last you until you have an appointment with our [facility] doctors. Please schedule an appointment with your eye doctor at [phone]. 30 tablet 0     No current facility-administered medications for this visit.        Allergies     Allergies   Allergen Reactions    Cipro [Ciprofloxacin] Rash     gets yeast infections - BAD with.    Lactulose GI Disturbance     Caused Vomiting       Medical / Surgical History      Past Medical History:   Diagnosis Date    Chest pain, unspecified 3/22/04    10/10 initially inseverity - responded to Aspirin and two doses of Nitroglycerin sublingual     Herpes simplex with other ophthalmic complications     on valtrex    Lipoma of unspecified site     Lower left suprascapular    Migraine, unspecified, with intractable migraine, so stated, without mention of status migrainosus 1/20/2007    midrin - about 30 pill a year     Past Surgical History:   Procedure Laterality Date    APPENDECTOMY  1980's    Retained suture    ARTHROPLASTY HIP  9/19/2012    Procedure: ARTHROPLASTY HIP;  Right Total Hip Minimally Invasive Surgery;  Surgeon: Devendra Douglas MD;  Location: [facility] OR    ARTHROSCOPY KNEE RT/LT  1989    Arthroscopy of the Left Knee    BUNIONECTOMY RT/LT      Bilateral bunionectomies x4 surgeries    DILATION AND CURETTAGE, OPERATIVE HYSTEROSCOPY WITH MORCELLATOR, COMBINED N/A 5/15/2019    Procedure: DILATION AND CURETTAGE,Hysteroscopy;  Surgeon: Lauren Moise MD;  Location: [facility] OR    EXCISE LESION TRUNK N/A 4/17/2015    Procedure: EXCISE LESION TRUNK;  Surgeon: Wander Gutierrez MD;  Location: [facility] OR    SURGICAL HISTORY OF -   1974, 1977    two normal deliveries    TUBAL LIGATION  1981-82       Social History     Social History     Socioeconomic History    Marital status:      Spouse name: Not on file    Number of children: Not on file    Years of education: Not on file    Highest education level: Not on file   Occupational History    Not on file   Tobacco Use    Smoking status: Every Day     Current packs/day: 1.00     Average packs/day: 1 pack/day for 45.0 years (45.0 ttl pk-yrs)     Types: Cigarettes    Smokeless tobacco: Never   Vaping Use    Vaping status: Never Used   Substance and Sexual Activity    Alcohol use: No    Drug use: No    Sexual activity: Not Currently     Partners: Male   Other Topics Concern    Parent/sibling w/ CABG, MI or angioplasty before 65F 55M? Not  "Asked     Service No    Blood Transfusions No    Caffeine Concern Yes     Comment: 3 cups and 1 can    Occupational Exposure No    Hobby Hazards No    Sleep Concern No    Stress Concern No    Weight Concern No    Special Diet No    Back Care Yes     Comment: hurt years ago Jr in High School, low back    Exercise Yes    Bike Helmet No    Seat Belt Yes    Self-Exams Not Asked   Social History Narrative    Not on file     Social Determinants of Health     Financial Resource Strain: Not on file   Food Insecurity: Not on file   Transportation Needs: Not on file   Physical Activity: Not on file   Stress: Not on file   Social Connections: Not on file   Interpersonal Safety: Low Risk  (2/13/2024)    Interpersonal Safety     Do you feel physically and emotionally safe where you currently live?: Yes     Within the past 12 months, have you been hit, slapped, kicked or otherwise physically hurt by someone?: No     Within the past 12 months, have you been humiliated or emotionally abused in other ways by your partner or ex-partner?: No   Housing Stability: Not on file       Family History     Family History   Problem Relation Age of Onset    Hypertension Mother     Heart Disease Mother     Migraines Mother     Cancer Father         lung    Heart Disease Father     Diabetes Father     Heart Disease Maternal Grandfather     Cancer Sister         cervical    Heart Disease Son         mummer    Alcohol/Drug Brother     Alcoholism Brother     Genitourinary Problems Brother         stones    Coronary Artery Disease Brother         injury \" maker\" tree fell on him    Glaucoma No family hx of     Macular Degeneration No family hx of     Aneurysm No family hx of     Brain Hemorrhage No family hx of     Seizure Disorder No family hx of     Cerebrovascular Disease No family hx of     Depression No family hx of        ROS     12 ROS completed, pertinent positive and negative in HPI    Physical Exam   There were no vitals taken " for this visit.   GENERAL: Healthy, alert and no distress  EYES:  No discharge or erythema, or obvious scleral/conjunctival abnormalities.  RESP: No audible wheeze, cough, or visible cyanosis.  No visible retractions or increased work of breathing.    SKIN: Visible skin clear. No significant rash, abnormal pigmentation or lesions.  NEURO: Cranial nerves grossly intact.  Mentation and speech appropriate for age.  PSYCH: Mentation appears normal, affect normal/bright, judgement and insight intact, normal speech and appearance well-groomed.     Labs/Imaging     Pertinent Labs were reviewed and updated in EPIC and discussed briefly.  Radiology Results were  reviewed and updated in EPIC and discussed briefly.    Summary of recent findings:   Lab Results   Component Value Date    A1C 6.0 04/12/2011       TSH   Date Value Ref Range Status   04/25/2023 3.80 0.30 - 4.20 uIU/mL Final   02/06/2023 27.54 (H) 0.30 - 4.20 uIU/mL Final   12/24/2021 0.48 0.40 - 4.00 mU/L Final   12/02/2019 2.11 0.40 - 4.00 mU/L Final   05/09/2019 <0.01 (L) 0.40 - 4.00 mU/L Final   02/13/2019 <0.01 (L) 0.40 - 4.00 mU/L Final   01/16/2019 <0.01 (L) 0.40 - 4.00 mU/L Final   10/05/2018 <0.01 (L) 0.40 - 4.00 mU/L Final     T4 Free   Date Value Ref Range Status   12/02/2019 0.80 0.76 - 1.46 ng/dL Final   05/09/2019 1.57 (H) 0.76 - 1.46 ng/dL Final   02/13/2019 1.85 (H) 0.76 - 1.46 ng/dL Final   01/16/2019 1.93 (H) 0.76 - 1.46 ng/dL Final   10/05/2018 3.97 (H) 0.76 - 1.46 ng/dL Final     Free T4   Date Value Ref Range Status   04/25/2023 1.09 0.90 - 1.70 ng/dL Final   02/06/2023 0.47 (L) 0.90 - 1.70 ng/dL Final   12/24/2021 1.06 0.76 - 1.46 ng/dL Final       Creatinine   Date Value Ref Range Status   05/15/2023 0.85 0.51 - 0.95 mg/dL Final   09/24/2019 0.76 0.52 - 1.04 mg/dL Final       Recent Labs   Lab Test 02/06/23  1409 12/24/21  1119 02/08/19  1141 02/06/15  0943   CHOL 193 231*   < > 183   HDL 57 57   < > 48*   * 155*   < > 113   TRIG 105 94   " < > 108   CHOLHDLRATIO  --   --   --  3.8    < > = values in this interval not displayed.       No results found for: \"[id number]\", \"[id number]\", \"[id number]\"    I personally reviewed the patient's outside records from [employer] EMR. Summary of pertinent findings in HPI.    Impression / Plan     1. Graves disease    Diagnosed in 2018, was on high dose MMI then lost to follow up for 1 year. TFTs on 2/2023 showed hypothyroidism. She has been on MMI 5 mg since 2/2023 with most recent TSH and fT4 from 4/2023 within normal limits. She he has not had any further labs since that time. Ran out of MMI more than a month ago.   Requesting refill for MMI.  We discussed the importance of TFT checks before refilling her MMI. She is planning to get her labs done tomorrow.        Test and/or medications prescribed today:  Orders Placed This Encounter   Procedures    TSH    T4 free    T3 total    Thyroid stimulating immunoglobulin     Follow up: 6 month    .The longitudinal plan of care for the diagnosis(es)/condition(s) as documented were addressed during this visit. Due to the added complexity in care, I will continue to support Tonya in the subsequent management and with ongoing continuity of care.    Marleen Acosta MD  Endocrinology, Diabetes and Metabolism  [facility]      "

## 2024-04-11 NOTE — PROGRESS NOTES
"Endocrinology Clinic Visit        Video-Visit Details    Type of service:  Video Visit/Phone Visit  Joined the call at 4/11/2024, 9:07:34 am.  Left the call at 4/11/2024, 9:18:08 am.    Originating Location (pt. Location): Home    Distant Location (provider location):  Off-site    Mode of Communication:  Video Conference via [employer], transitioned to D[employer]im[employer] given poor video connection quality    Physician has received verbal consent for a Video Visit from the patient? Yes    I spent a total of 20 minutes on the date of encounter reviewing medical records, evaluating the patient, coordinating care and documenting in the EHR, as detailed above.      NAME:  Tonya Rodriguez  PCP:  Robb Cabrera  MRN:  [id number]  Reason for Consult:  Graves disease  Requesting Provider:  No ref. provider found    Chief Complaint     Chief Complaint   Patient presents with    RECHECK       History of Present Illness     Tonya Rodriguez is a 67 year old female who is seen in video visit for Graves' Disease. She was previously followed by Dr. Figueroa, with their most recent visit in 1/2022. She established with me 4/2023. Last seen 10/2023.    She has a PMH significant for MS.  In 2018 she had progressive symptoms included weight loss ~35 pounds, insomnia, decreased appetite, palpitation. She was diagnosed with hyperthyroidism and started on MMI since then. She had elevated TSI on 1/2019 and 2/2019. She was taking 25 mg of methimazole daily and 25 mg of metoprolol daily.      In 1/2022 she saw Dr. Figueroa, at that time continuing methimazole 25 mg every day. I reviewed all her TFT in records. Most recent ones significant for:     Latest Reference Range & Units 12/24/21 11:19 02/06/23 14:09   T4 Free 0.90 - 1.70 ng/dL 1.06 0.47 (L)   TSH 0.40 - 4.00 mU/L 0.48    TSH 0.30 - 4.20 uIU/mL  27.54 (H)        There is a note from Dr. Francis on 2/8/23 to call the patient and tell her :\"Please hold (don't take) methimazole for " "one week, then restart at 5 mg/day.  Repeat labs one week. I am submitting new Rx for methimazole 5 mg dose\"    She said at first she is taking 25 mg daily then said 2 tablets of what is prescribed. I asked to go and check her pill box and her bottle. She confirmed she was on 5 mg daily restarted mid February 2023.  On 10/19/23, she reported overall feeling well with no new symptoms or recent illnesses. No issues taking the 5 mg of methimazole and 25 mg of metoprolol daily. She has a chronic tremor and chronic constipation that is stable. No change in weight or unintentional weight loss. No palpitations or chest pain. No abdominal pain.    She said her eye symptoms are all related to her MS and not to her thyroid. Most recent appointment with ophthalmology was in 8/2023, with no medication changes recommended at that time and a reported stable exam. No change in proptosis.      She did not complete any labs since last year, on 4/25/23 with normal TFT. She reported today she ran out of MMI, unsure for how long she has been off. She said for sure few month.    Problem List     Patient Active Problem List   Diagnosis    Multiple sclerosis (H)    Allergic rhinitis    Other chronic allergic conjunctivitis    Dysphagia    Hyperlipidemia with target LDL less than 130    S/P revision of total hip  RIGHT    Osteoarthritis of hip    Major depression in complete remission (H)    Urinary incontinence    Restless leg    Herpes keratitis    Advanced directives, counseling/discussion    Family history of rheumatoid arthritis    Speech dysfluency    Self-catheterizes urinary bladder    Insomnia, unspecified type    Graves disease    Smoker    Screening for cervical cancer    Raynaud's syndrome without gangrene     Major depression in complete remission (H24)    Neurogenic bladder    Tachycardia    Hyperthyroidism    Mixed hyperlipidemia        Medications     Current Outpatient Medications   Medication Sig Dispense Refill    " alendronate (FOSAMAX) 70 MG tablet Take 1 tablet (70 mg) by mouth every 7 days 13 tablet 3    gabapentin (NEURONTIN) 100 MG capsule Take 2 capsules (200 mg) by mouth at bedtime 180 capsule 3    imipramine (TOFRANIL) 50 MG tablet TAKE 1 TABLET BY MOUTH AT BEDTIME GENERIC EQUIVALENT FOR TOFRANIL 90 tablet 3    methimazole (TAPAZOLE) 5 MG tablet Take 1 tablet (5 mg) by mouth daily 30 tablet 3    metoprolol succinate ER (TOPROL XL) 25 MG 24 hr tablet ** VISIT DUE/LAST FILL ** TAKE 1 TABLET BY MOUTH DAILY GENERIC EQUIVALENT FOR TOPROL XL 90 tablet 0    NIFEdipine ER (ADALAT CC) 30 MG 24 hr tablet Take 1 tablet (30 mg) by mouth daily 90 tablet 3    order for DME Equipment being ordered: urinary catheter 6 times daily 540 catheter 3    oxyBUTYnin ER (DITROPAN XL) 15 MG 24 hr tablet Take 2 tablets (30 mg) by mouth at bedtime 180 tablet 0    prednisoLONE acetate (PRED FORTE) 1 % ophthalmic suspension       simvastatin (ZOCOR) 20 MG tablet Take 1 tablet (20 mg) by mouth At Bedtime 90 tablet 3    traZODone (DESYREL) 50 MG tablet Take 1 tablet (50 mg) by mouth nightly as needed for sleep 90 tablet 0    valACYclovir (VALTREX) 500 MG tablet Take 1 tablet (500 mg) by mouth daily Will prescribe only for 2 months to last you until you have an appointment with our [facility] doctors. Please schedule an appointment with your eye doctor at [phone]. 30 tablet 0     No current facility-administered medications for this visit.        Allergies     Allergies   Allergen Reactions    Cipro [Ciprofloxacin] Rash     gets yeast infections - BAD with.    Lactulose GI Disturbance     Caused Vomiting       Medical / Surgical History     Past Medical History:   Diagnosis Date    Chest pain, unspecified 3/22/04    10/10 initially inseverity - responded to Aspirin and two doses of Nitroglycerin sublingual     Herpes simplex with other ophthalmic complications     on valtrex    Lipoma of unspecified site     Lower left suprascapular    Migraine,  unspecified, with intractable migraine, so stated, without mention of status migrainosus 1/20/2007    midrin - about 30 pill a year     Past Surgical History:   Procedure Laterality Date    APPENDECTOMY  1980's    Retained suture    ARTHROPLASTY HIP  9/19/2012    Procedure: ARTHROPLASTY HIP;  Right Total Hip Minimally Invasive Surgery;  Surgeon: Devendra Douglas MD;  Location: [facility]    ARTHROSCOPY KNEE RT/LT  1989    Arthroscopy of the Left Knee    BUNIONECTOMY RT/LT      Bilateral bunionectomies x4 surgeries    DILATION AND CURETTAGE, OPERATIVE HYSTEROSCOPY WITH MORCELLATOR, COMBINED N/A 5/15/2019    Procedure: DILATION AND CURETTAGE,Hysteroscopy;  Surgeon: Lauren Moise MD;  Location: [facility]    EXCISE LESION TRUNK N/A 4/17/2015    Procedure: EXCISE LESION TRUNK;  Surgeon: Wander Gutierrez MD;  Location: [facility]    SURGICAL HISTORY OF -   1974, 1977    two normal deliveries    TUBAL LIGATION  1981-82       Social History     Social History     Socioeconomic History    Marital status:      Spouse name: Not on file    Number of children: Not on file    Years of education: Not on file    Highest education level: Not on file   Occupational History    Not on file   Tobacco Use    Smoking status: Every Day     Current packs/day: 1.00     Average packs/day: 1 pack/day for 45.0 years (45.0 ttl pk-yrs)     Types: Cigarettes    Smokeless tobacco: Never   Vaping Use    Vaping status: Never Used   Substance and Sexual Activity    Alcohol use: No    Drug use: No    Sexual activity: Not Currently     Partners: Male   Other Topics Concern    Parent/sibling w/ CABG, MI or angioplasty before 65F 55M? Not Asked     Service No    Blood Transfusions No    Caffeine Concern Yes     Comment: 3 cups and 1 can    Occupational Exposure No    Hobby Hazards No    Sleep Concern No    Stress Concern No    Weight Concern No    Special Diet No    Back Care Yes     Comment: hurt years ago Jr in High School, low back     "Exercise Yes    Bike Helmet No    Seat Belt Yes    Self-Exams Not Asked   Social History Narrative    Not on file     Social Determinants of Health     Financial Resource Strain: Not on file   Food Insecurity: Not on file   Transportation Needs: Not on file   Physical Activity: Not on file   Stress: Not on file   Social Connections: Not on file   Interpersonal Safety: Low Risk  (2/13/2024)    Interpersonal Safety     Do you feel physically and emotionally safe where you currently live?: Yes     Within the past 12 months, have you been hit, slapped, kicked or otherwise physically hurt by someone?: No     Within the past 12 months, have you been humiliated or emotionally abused in other ways by your partner or ex-partner?: No   Housing Stability: Not on file       Family History     Family History   Problem Relation Age of Onset    Hypertension Mother     Heart Disease Mother     Migraines Mother     Cancer Father         lung    Heart Disease Father     Diabetes Father     Heart Disease Maternal Grandfather     Cancer Sister         cervical    Heart Disease Son         mummer    Alcohol/Drug Brother     Alcoholism Brother     Genitourinary Problems Brother         stones    Coronary Artery Disease Brother         injury \" maker\" tree fell on him    Glaucoma No family hx of     Macular Degeneration No family hx of     Aneurysm No family hx of     Brain Hemorrhage No family hx of     Seizure Disorder No family hx of     Cerebrovascular Disease No family hx of     Depression No family hx of        ROS     12 ROS completed, pertinent positive and negative in HPI    Physical Exam   There were no vitals taken for this visit.   GENERAL: Healthy, alert and no distress  EYES:  No discharge or erythema, or obvious scleral/conjunctival abnormalities.  RESP: No audible wheeze, cough, or visible cyanosis.  No visible retractions or increased work of breathing.    SKIN: Visible skin clear. No significant rash, abnormal " "pigmentation or lesions.  NEURO: Cranial nerves grossly intact.  Mentation and speech appropriate for age.  PSYCH: Mentation appears normal, affect normal/bright, judgement and insight intact, normal speech and appearance well-groomed.     Labs/Imaging     Pertinent Labs were reviewed and updated in EPIC and discussed briefly.  Radiology Results were  reviewed and updated in EPIC and discussed briefly.    Summary of recent findings:   Lab Results   Component Value Date    A1C 6.0 04/12/2011       TSH   Date Value Ref Range Status   04/25/2023 3.80 0.30 - 4.20 uIU/mL Final   02/06/2023 27.54 (H) 0.30 - 4.20 uIU/mL Final   12/24/2021 0.48 0.40 - 4.00 mU/L Final   12/02/2019 2.11 0.40 - 4.00 mU/L Final   05/09/2019 <0.01 (L) 0.40 - 4.00 mU/L Final   02/13/2019 <0.01 (L) 0.40 - 4.00 mU/L Final   01/16/2019 <0.01 (L) 0.40 - 4.00 mU/L Final   10/05/2018 <0.01 (L) 0.40 - 4.00 mU/L Final     T4 Free   Date Value Ref Range Status   12/02/2019 0.80 0.76 - 1.46 ng/dL Final   05/09/2019 1.57 (H) 0.76 - 1.46 ng/dL Final   02/13/2019 1.85 (H) 0.76 - 1.46 ng/dL Final   01/16/2019 1.93 (H) 0.76 - 1.46 ng/dL Final   10/05/2018 3.97 (H) 0.76 - 1.46 ng/dL Final     Free T4   Date Value Ref Range Status   04/25/2023 1.09 0.90 - 1.70 ng/dL Final   02/06/2023 0.47 (L) 0.90 - 1.70 ng/dL Final   12/24/2021 1.06 0.76 - 1.46 ng/dL Final       Creatinine   Date Value Ref Range Status   05/15/2023 0.85 0.51 - 0.95 mg/dL Final   09/24/2019 0.76 0.52 - 1.04 mg/dL Final       Recent Labs   Lab Test 02/06/23  1409 12/24/21  1119 02/08/19  1141 02/06/15  0943   CHOL 193 231*   < > 183   HDL 57 57   < > 48*   * 155*   < > 113   TRIG 105 94   < > 108   CHOLHDLRATIO  --   --   --  3.8    < > = values in this interval not displayed.       No results found for: \"UWNA51JLSYP\", \"[id number]\", \"[id number]\"    I personally reviewed the patient's outside records from [facility] EMR. Summary of pertinent findings in HPI.    Impression / Plan     1. Graves " disease    Diagnosed in 2018, was on high dose MMI then lost to follow up for 1 year. TFTs on 2/2023 showed hypothyroidism. She has been on MMI 5 mg since 2/2023 with most recent TSH and fT4 from 4/2023 within normal limits. She he has not had any further labs since that time. Ran out of MMI more than a month ago.   Requesting refill for MMI.  We discussed the importance of TFT checks before refilling her MMI. She is planning to get her labs done tomorrow.        Test and/or medications prescribed today:  Orders Placed This Encounter   Procedures    TSH    T4 free    T3 total    Thyroid stimulating immunoglobulin     Follow up: 6 month    .The longitudinal plan of care for the diagnosis(es)/condition(s) as documented were addressed during this visit. Due to the added complexity in care, I will continue to support Tonya in the subsequent management and with ongoing continuity of care.    Marleen Acosta MD  Endocrinology, Diabetes and Metabolism  [facility]

## 2024-04-15 ENCOUNTER — VIRTUAL VISIT (OUTPATIENT)
Dept: PULMONOLOGY | Facility: CLINIC | Age: 69
End: 2024-04-15
Attending: INTERNAL MEDICINE
Payer: COMMERCIAL

## 2024-04-15 ENCOUNTER — ANCILLARY PROCEDURE (OUTPATIENT)
Dept: CT IMAGING | Facility: CLINIC | Age: 69
End: 2024-04-15
Attending: INTERNAL MEDICINE
Payer: COMMERCIAL

## 2024-04-15 VITALS — BODY MASS INDEX: 21.47 KG/M2 | WEIGHT: 150 LBS | HEIGHT: 70 IN

## 2024-04-15 DIAGNOSIS — R91.8 PULMONARY NODULES: Primary | ICD-10-CM

## 2024-04-15 DIAGNOSIS — R91.8 PULMONARY NODULES: ICD-10-CM

## 2024-04-15 PROCEDURE — 71250 CT THORAX DX C-: CPT | Performed by: RADIOLOGY

## 2024-04-15 PROCEDURE — 99215 OFFICE O/P EST HI 40 MIN: CPT | Mod: 95 | Performed by: INTERNAL MEDICINE

## 2024-04-15 ASSESSMENT — PAIN SCALES - GENERAL: PAINLEVEL: NO PAIN (0)

## 2024-04-15 NOTE — PROGRESS NOTES
"LUNG NODULE & INTERVENTIONAL PULMONARY CLINIC  [facility], [facility], [facility]   VIDEO VISIT    Tonya Rodriguez MRN# [id number]   Age: 68 year old YOB: 1955     Reason for Consultation: Lung Nodule     The patient has been notified of following:   \"This video visit will be conducted via a call between you and your physician/provider. We have found that certain health care needs can be provided without the need for an in-person physical exam.  This service lets us provide the care you need with a video conversation.  If a prescription is necessary we can send it directly to your pharmacy.  If lab work is needed we can place an order for that and you can then stop by our lab to have the test done at a later time.  Video visits are billed at different rates depending on your insurance coverage.  Please reach out to your insurance provider with any questions.  If during the course of the call the physician/provider feels a video visit is not appropriate, you will not be charged for this service.\"  Patient has given verbal consent for Video visit? Yes  How would you like to obtain your AVS? Please refer to rooming staff note  Patient would like the video invitation sent by: Please refer to rooming staff note   Will anyone else be joining your video visit? Please refer to rooming staff note    Video-Visit Details     Type of service:  Video Visit  Video Start Time: 125  Video End Time: 137  Provider Name: Rayshawn Marrero MD, A   Originating Location (pt. Location): Home  Provider Location: Off campus   Distant Location (provider location): Home/Clinic  Platform used for Video Visit: [facility]    Assessment and Plan:    1. Established multiple pulmonary lung nodule(s). Report slight increase in atelectatic area in RLL. Appears similar to August in my opinion. Will repeat CT in 6mo     2. Tobacco use. Cutting down on smoking.     Billing: I spent a total of " 45min spent on date of encounter which includes prep time, visit with the patient and post visit work including documentation and nursing communication     Rayshawn Marrero MD, MHA  Associate Professor of Medicine  Section of Interventional Pulmonology   Division of Pulmonary, Allergy, Critical Care and Sleep Medicine   [facility]  Pager: [phone]   Office: [phone]  Email: [email or web address]    Mayte Perez RN   Interventional Pulmonary Care Coordinator   Office: [phone]  Email: [email or web address]     Jadiel Powell  Interventional Pulmonary Surgery Scheduler   Office: [phone]  Email: [email or web address]         History:     Alexey Rodriguez is a 67 year old female with sig h/o for MS, HTN, hyperlipidemia who is here for evaluation/followup of Lung Nodule.     - No new resp sx or complaints. Denies dyspnea or cough.   - here for followup nodules  - Personal hx of cancer: no   - Family hx of cancer: Dad had SCLC.   - Exposure hx: Denies asbestos or radon exposure   - Tobacco hx: Current Smoker, 3ppd since 11yo. Down to 0.75ppd for the past 2 years. Longest quit was <2 weeks. Tried NRT in the past   - My interpretation of the images relevant for this visit includes: nodules   - My interpretation of the PFT's relevant for this visit includes: Normal      Culprit Nodule(s):   1: Right lower lobe nodule and is 12 mm in size/severity and non-calcified in morphology/quality. First seen by chest CT on 5/30/23. No interval change  2: Right lower lobe nodule and is 14 mm in size/severity and non-calcified in morphology/quality. 3. First seen by chest CT on 5/30/23. No interval change  Multiple bilateral lung nodule(s) that are sub 10 mm. First seen by chest CT on 5/30/23. There is no interval change     Other active medical problems include:   - has HTN, hyperlipidemia. stable   - has MS.            Past Medical History:      Past Medical History:    Diagnosis Date     Chest pain, unspecified 3/22/04    10/10 initially inseverity - responded to Aspirin and two doses of Nitroglycerin sublingual      Herpes simplex with other ophthalmic complications     on valtrex     Lipoma of unspecified site     Lower left suprascapular     Migraine, unspecified, with intractable migraine, so stated, without mention of status migrainosus 1/20/2007    midrin - about 30 pill a year             Past Surgical History:      Past Surgical History:   Procedure Laterality Date     APPENDECTOMY  1980's    Retained suture     ARTHROPLASTY HIP  9/19/2012    Procedure: ARTHROPLASTY HIP;  Right Total Hip Minimally Invasive Surgery;  Surgeon: Devendra Douglas MD;  Location: [facility]     ARTHROSCOPY KNEE RT/LT  1989    Arthroscopy of the Left Knee     BUNIONECTOMY RT/LT      Bilateral bunionectomies x4 surgeries     DILATION AND CURETTAGE, OPERATIVE HYSTEROSCOPY WITH MORCELLATOR, COMBINED N/A 5/15/2019    Procedure: DILATION AND CURETTAGE,Hysteroscopy;  Surgeon: Lauren Moise MD;  Location: [facility]     EXCISE LESION TRUNK N/A 4/17/2015    Procedure: EXCISE LESION TRUNK;  Surgeon: Wander Gutierrez MD;  Location: [facility]     SURGICAL HISTORY OF -   1974, 1977    two normal deliveries     TUBAL LIGATION  1981-82            Social History:     Social History     Tobacco Use     Smoking status: Every Day     Current packs/day: 1.00     Average packs/day: 1 pack/day for 45.0 years (45.0 ttl pk-yrs)     Types: Cigarettes     Smokeless tobacco: Never   Substance Use Topics     Alcohol use: No            Family History:     Family History   Problem Relation Age of Onset     Hypertension Mother      Heart Disease Mother      Migraines Mother      Cancer Father         lung     Heart Disease Father      Diabetes Father      Heart Disease Maternal Grandfather      Cancer Sister         cervical     Heart Disease Son         mummer     Alcohol/Drug Brother      Alcoholism Brother       "Genitourinary Problems Brother         stones     Coronary Artery Disease Brother         injury \" maker\" tree fell on him     Glaucoma No family hx of      Macular Degeneration No family hx of      Aneurysm No family hx of      Brain Hemorrhage No family hx of      Seizure Disorder No family hx of      Cerebrovascular Disease No family hx of      Depression No family hx of              Allergies:      Allergies   Allergen Reactions     Cipro [Ciprofloxacin] Rash     gets yeast infections - BAD with.     Lactulose GI Disturbance     Caused Vomiting            Medications:     Current Outpatient Medications   Medication Sig Dispense Refill     alendronate (FOSAMAX) 70 MG tablet Take 1 tablet (70 mg) by mouth every 7 days 13 tablet 3     gabapentin (NEURONTIN) 100 MG capsule Take 2 capsules (200 mg) by mouth at bedtime 180 capsule 3     imipramine (TOFRANIL) 50 MG tablet TAKE 1 TABLET BY MOUTH AT BEDTIME GENERIC EQUIVALENT FOR TOFRANIL 90 tablet 3     methimazole (TAPAZOLE) 5 MG tablet Take 1 tablet (5 mg) by mouth daily 30 tablet 3     metoprolol succinate ER (TOPROL XL) 25 MG 24 hr tablet ** VISIT DUE/LAST FILL ** TAKE 1 TABLET BY MOUTH DAILY GENERIC EQUIVALENT FOR TOPROL XL 90 tablet 0     NIFEdipine ER (ADALAT CC) 30 MG 24 hr tablet Take 1 tablet (30 mg) by mouth daily 90 tablet 3     order for DME Equipment being ordered: urinary catheter 6 times daily 540 catheter 3     oxyBUTYnin ER (DITROPAN XL) 15 MG 24 hr tablet Take 2 tablets (30 mg) by mouth at bedtime 180 tablet 0     prednisoLONE acetate (PRED FORTE) 1 % ophthalmic suspension        simvastatin (ZOCOR) 20 MG tablet Take 1 tablet (20 mg) by mouth At Bedtime 90 tablet 3     traZODone (DESYREL) 50 MG tablet Take 1 tablet (50 mg) by mouth nightly as needed for sleep 90 tablet 0     valACYclovir (VALTREX) 500 MG tablet Take 1 tablet (500 mg) by mouth daily Will prescribe only for 2 months to last you until you have an appointment with our [facility] doctors. " Please schedule an appointment with your eye doctor at [phone]. 30 tablet 0     No current facility-administered medications for this visit.            Review of Systems:     12-point ROS reviewed and abnormalities stated in the history.         Physical Exam:     Constitutional - looks well, in no apparent distress  Eyes - no redness or discharge  Respiratory -breathing appears comfortable.  No cough.  Skin - No appreciable discoloration or lesions (very limited exam)  Neurological - No apparent tremors. Speech fluent and articlate  Psychiatric - no signs of delirium or anxiety     Exam limited to that easily identified on a virtual visit. The rest of a comprehensive physical examination is deferred due to PHE (public health emergency) video visit restrictions.         Current Laboratory Data:   All laboratory and imaging data reviewed.          Latest Ref Rng & Units 6/13/2023     1:00 PM   PFT   FVC L 2.91    FEV1 L 2.12    FVC% % 82    FEV1% % 78

## 2024-04-15 NOTE — NURSING NOTE
Is the patient currently in the state of [place]? YES    Visit mode:VIDEO    If the visit is dropped, the patient can be reconnected by: VIDEO VISIT: Text to cell phone:   Telephone Information:   Mobile [phone]       Will anyone else be joining the visit? NO  (If patient encounters technical issues they should call [phone] :[id number])    How would you like to obtain your AVS? MyChart    Are changes needed to the allergy or medication list? Pt stated no changes to allergies and Pt stated no med changes    Are refills needed on medications prescribed by this physician? NO    Reason for visit: DANY NICHOLS

## 2024-04-15 NOTE — PROGRESS NOTES
"Virtual Visit Details    Type of service:  Video Visit   Video Start Time: {video visit start/end time for provider to select:545213}  Video End Time:{video visit start/end time for provider to select:314559}    Originating Location (pt. Location): {video visit patient location:764503::\"Home\"}  {PROVIDER LOCATION On-site should be selected for visits conducted from your clinic location or adjoining [facility] hospital, academic office, or other nearby [facility] building. Off-site should be selected for all other provider locations, including home:988718}  Distant Location (provider location):  {virtual location provider:781838}  Platform used for Video Visit: {Virtual Visit Platforms:152443::\"TrustTeam\"}  "

## 2024-04-16 ENCOUNTER — LAB (OUTPATIENT)
Dept: LAB | Facility: CLINIC | Age: 69
End: 2024-04-16
Payer: COMMERCIAL

## 2024-04-16 DIAGNOSIS — E05.00 GRAVES DISEASE: ICD-10-CM

## 2024-04-16 LAB
T4 FREE SERPL-MCNC: 1.41 NG/DL (ref 0.9–1.7)
TSH SERPL DL<=0.005 MIU/L-ACNC: 0.64 UIU/ML (ref 0.3–4.2)

## 2024-04-16 PROCEDURE — 36415 COLL VENOUS BLD VENIPUNCTURE: CPT

## 2024-04-16 PROCEDURE — 84445 ASSAY OF TSI GLOBULIN: CPT | Mod: 90

## 2024-04-16 PROCEDURE — 84480 ASSAY TRIIODOTHYRONINE (T3): CPT

## 2024-04-16 PROCEDURE — 99000 SPECIMEN HANDLING OFFICE-LAB: CPT

## 2024-04-16 PROCEDURE — 84443 ASSAY THYROID STIM HORMONE: CPT

## 2024-04-16 PROCEDURE — 84439 ASSAY OF FREE THYROXINE: CPT

## 2024-04-17 ENCOUNTER — TELEPHONE (OUTPATIENT)
Dept: ENDOCRINOLOGY | Facility: CLINIC | Age: 69
End: 2024-04-17
Payer: COMMERCIAL

## 2024-04-17 NOTE — TELEPHONE ENCOUNTER
Left Voicemail (1st Attempt) and Sent Mychart (1st Attempt) for the patient to call back and schedule the following:    Appointment type: 6 month follow up  Provider: Dr. Acosta  Return date: Oct 2024  Specialty phone number: [phone]  Additional appointment(s) needed:   Additonal Notes:

## 2024-04-18 LAB — T3 SERPL-MCNC: 102 NG/DL (ref 85–202)

## 2024-04-22 LAB — TSI SER-ACNC: 1 TSI INDEX

## 2024-04-22 NOTE — TELEPHONE ENCOUNTER
Left Voicemail (2nd Attempt) and Sent Mychart (2nd Attempt) for the patient to call back and schedule the following:    Appointment type: Return Endocrine  Provider: Dr. Acosta  Return date: 6 mo (around Oct 2024)  Specialty phone number: [phone]  Additional appointment(s) needed: NA  Additonal Notes: LVM x2/MyC x2 (final)

## 2024-05-02 DIAGNOSIS — E78.2 MIXED HYPERLIPIDEMIA: ICD-10-CM

## 2024-05-03 RX ORDER — SIMVASTATIN 20 MG
20 TABLET ORAL AT BEDTIME
Qty: 90 TABLET | Refills: 0 | Status: SHIPPED | OUTPATIENT
Start: 2024-05-03 | End: 2024-07-22

## 2024-06-06 DIAGNOSIS — R32 URINARY INCONTINENCE, UNSPECIFIED TYPE: ICD-10-CM

## 2024-06-06 DIAGNOSIS — G47.00 INSOMNIA, UNSPECIFIED TYPE: ICD-10-CM

## 2024-06-07 RX ORDER — TRAZODONE HYDROCHLORIDE 50 MG/1
TABLET, FILM COATED ORAL
Qty: 90 TABLET | Refills: 3 | Status: SHIPPED | OUTPATIENT
Start: 2024-06-07

## 2024-06-07 RX ORDER — OXYBUTYNIN CHLORIDE 15 MG/1
TABLET, EXTENDED RELEASE ORAL
Qty: 180 TABLET | Refills: 3 | Status: SHIPPED | OUTPATIENT
Start: 2024-06-07 | End: 2024-06-07

## 2024-06-07 RX ORDER — TRAZODONE HYDROCHLORIDE 50 MG/1
TABLET, FILM COATED ORAL
Qty: 90 TABLET | Refills: 3 | Status: SHIPPED | OUTPATIENT
Start: 2024-06-07 | End: 2024-06-07

## 2024-06-07 RX ORDER — OXYBUTYNIN CHLORIDE 15 MG/1
TABLET, EXTENDED RELEASE ORAL
Qty: 180 TABLET | Refills: 3 | Status: SHIPPED | OUTPATIENT
Start: 2024-06-07

## 2024-06-07 NOTE — TELEPHONE ENCOUNTER
Refill request for: Oxybutynin & Trazodone      LOV: 4/9/24  NOV: 4/11/25    90 day supply with 3 refills Medication T'd for review and signature

## 2024-06-07 NOTE — TELEPHONE ENCOUNTER
Please resend rx's. Failed to transmit to pharmacy.    oxyBUTYnin ER (DITROPAN XL) 15 MG 24 hr tablet 180 tablet 3 6/7/2024 -- No   Sig: TAKE 2 TABLETS BY MOUTH AT BEDTIME GENERIC EQUIVALENT FOR DITROPAN XL   Sent to pharmacy as: oxyBUTYnin Chloride ER 15 MG Oral Tablet Extended Release 24 Hour (DITROPAN XL)   Class: E-Prescribe   Order: [id number]   E-Prescribing Status: Transmission to pharmacy failed (6/7/2024  3:08 PM CDT      Disp Refills Start End TAMI   traZODone (DESYREL) 50 MG tablet 90 tablet 3 6/7/2024 -- No   Sig: TAKE 1 TABLET BY MOUTH NIGHTLY AS NEEDED FOR SLEEP GENERIC EQUIVALENT FOR DESYREL   Sent to pharmacy as: traZODone HCl 50 MG Oral Tablet (DESYREL)   Class: E-Prescribe   Order: [id number]   E-Prescribing Status: Transmission to pharmacy failed (6/7/2024  3:08 PM CDT     Edith Em LPN on 6/7/2024 at 3:44 PM

## 2024-06-08 ENCOUNTER — HEALTH MAINTENANCE LETTER (OUTPATIENT)
Age: 69
End: 2024-06-08

## 2024-06-21 DIAGNOSIS — E05.90 HYPERTHYROIDISM: ICD-10-CM

## 2024-06-21 DIAGNOSIS — R00.0 TACHYCARDIA: ICD-10-CM

## 2024-06-21 RX ORDER — METOPROLOL SUCCINATE 25 MG/1
TABLET, EXTENDED RELEASE ORAL
Qty: 90 TABLET | Refills: 0 | OUTPATIENT
Start: 2024-06-21

## 2024-06-21 NOTE — TELEPHONE ENCOUNTER
Overdue for needed care. Please call to schedule appt to establish care (last saw Dr. Knapp and med was filled by her, but she has closed practice).    Gaviota Sandoval RN  [facility]

## 2024-07-22 ENCOUNTER — MYC MEDICAL ADVICE (OUTPATIENT)
Dept: FAMILY MEDICINE | Facility: CLINIC | Age: 69
End: 2024-07-22
Payer: COMMERCIAL

## 2024-07-22 DIAGNOSIS — E78.2 MIXED HYPERLIPIDEMIA: ICD-10-CM

## 2024-07-22 RX ORDER — SIMVASTATIN 20 MG
TABLET ORAL
Qty: 30 TABLET | Refills: 0 | Status: SHIPPED | OUTPATIENT
Start: 2024-07-22 | End: 2024-08-14

## 2024-07-22 NOTE — TELEPHONE ENCOUNTER
30 tabs sent in. She is over due for a visit with her PCP. Please assist with scheduling  PRISCILLA Noriega CNP

## 2024-07-22 NOTE — TELEPHONE ENCOUNTER
Dr. Knapp is NOT pt's PCP - Only saw pt once for UTI.  Pt needs appt to establish care with new PCP and for Annual Wellness Visit for ANY further refills.      LM for pt - Also mailed letter and sent My Chart message.

## 2024-07-22 NOTE — TELEPHONE ENCOUNTER
Requested Prescriptions   Pending Prescriptions Disp Refills    simvastatin (ZOCOR) 20 MG tablet [Pharmacy Med Name: SIMVASTATIN 20MG TABLETS] 90 tablet 0     Sig: TAKE 1 TABLET BY MOUTH AT BEDTIME. LAST FILL, DUE FOR VISIT       Antihyperlipidemic agents Failed - 7/22/2024  8:02 AM        Failed - LDL on file in the past 12 months        Passed - Medication is active on med list        Passed - Recent (12 mo) or future (90 days) visit within the authorizing provider's specialty     The patient must have completed an in-person or virtual visit within the past 12 months or has a future visit scheduled within the next 90 days with the authorizing provider s specialty.  Urgent care and e-visits do not quality as an office visit for this protocol.          Passed - Patient is age 18 years or older        Passed - No active pregnancy on record        Passed - No positive pregnancy test in past 12 mos

## 2024-07-22 NOTE — LETTER
[facility]  [address] [facility] [address]  [phone]  July 22, 2024    Tonya Rodriguez  [address] [address] [address]    Dear Tonya,    We care about your health and have reviewed your health plan including your medical conditions, medication list, and lab results.  Based on this review, it is recommended that you follow up regarding the following health topic(s):     -Establish care with new Primary Care Provider and Annual Wellness Visit. In Person appointment is needed  for any further medication refills     Please call us at [phone] (or use [facility]) to address the above recommendations.     Thank you for trusting [facility] and we appreciate the opportunity to serve you.  We look forward to supporting your healthcare needs in the future.    Healthy Regards,      Your Health Care Team  [facility]

## 2024-07-25 ENCOUNTER — PATIENT OUTREACH (OUTPATIENT)
Dept: CARE COORDINATION | Facility: CLINIC | Age: 69
End: 2024-07-25
Payer: COMMERCIAL

## 2024-08-11 DIAGNOSIS — E78.2 MIXED HYPERLIPIDEMIA: ICD-10-CM

## 2024-08-14 DIAGNOSIS — E05.90 HYPERTHYROIDISM: ICD-10-CM

## 2024-08-14 DIAGNOSIS — R00.0 TACHYCARDIA: ICD-10-CM

## 2024-08-14 RX ORDER — SIMVASTATIN 20 MG
TABLET ORAL
Qty: 30 TABLET | Refills: 0 | Status: SHIPPED | OUTPATIENT
Start: 2024-08-14 | End: 2024-09-11

## 2024-08-15 RX ORDER — METOPROLOL SUCCINATE 25 MG/1
TABLET, EXTENDED RELEASE ORAL
Qty: 90 TABLET | Refills: 0 | OUTPATIENT
Start: 2024-08-15

## 2024-08-27 ENCOUNTER — TRANSFERRED RECORDS (OUTPATIENT)
Dept: HEALTH INFORMATION MANAGEMENT | Facility: CLINIC | Age: 69
End: 2024-08-27

## 2024-09-11 ENCOUNTER — OFFICE VISIT (OUTPATIENT)
Dept: FAMILY MEDICINE | Facility: CLINIC | Age: 69
End: 2024-09-11
Payer: COMMERCIAL

## 2024-09-11 VITALS
RESPIRATION RATE: 16 BRPM | HEART RATE: 94 BPM | OXYGEN SATURATION: 98 % | DIASTOLIC BLOOD PRESSURE: 86 MMHG | SYSTOLIC BLOOD PRESSURE: 120 MMHG | HEIGHT: 70 IN | BODY MASS INDEX: 23.91 KG/M2 | WEIGHT: 167 LBS

## 2024-09-11 DIAGNOSIS — R53.83 OTHER FATIGUE: ICD-10-CM

## 2024-09-11 DIAGNOSIS — E05.90 HYPERTHYROIDISM: ICD-10-CM

## 2024-09-11 DIAGNOSIS — M81.0 AGE-RELATED OSTEOPOROSIS WITHOUT CURRENT PATHOLOGICAL FRACTURE: ICD-10-CM

## 2024-09-11 DIAGNOSIS — I73.00 RAYNAUD'S SYNDROME WITHOUT GANGRENE: ICD-10-CM

## 2024-09-11 DIAGNOSIS — G35 MULTIPLE SCLEROSIS (H): ICD-10-CM

## 2024-09-11 DIAGNOSIS — R32 URINARY INCONTINENCE, UNSPECIFIED TYPE: ICD-10-CM

## 2024-09-11 DIAGNOSIS — R00.0 TACHYCARDIA: ICD-10-CM

## 2024-09-11 DIAGNOSIS — Z12.31 ENCOUNTER FOR SCREENING MAMMOGRAM FOR BREAST CANCER: ICD-10-CM

## 2024-09-11 DIAGNOSIS — H17.9 LEFT CORNEAL SCAR: ICD-10-CM

## 2024-09-11 DIAGNOSIS — E78.2 MIXED HYPERLIPIDEMIA: Primary | ICD-10-CM

## 2024-09-11 DIAGNOSIS — D58.2 ELEVATED HEMOGLOBIN (H): ICD-10-CM

## 2024-09-11 DIAGNOSIS — R79.9 ABNORMAL FINDING OF BLOOD CHEMISTRY, UNSPECIFIED: ICD-10-CM

## 2024-09-11 LAB
ANION GAP SERPL CALCULATED.3IONS-SCNC: 10 MMOL/L (ref 7–15)
BASOPHILS # BLD AUTO: 0 10E3/UL (ref 0–0.2)
BASOPHILS NFR BLD AUTO: 0 %
BUN SERPL-MCNC: 21.5 MG/DL (ref 8–23)
CALCIUM SERPL-MCNC: 9.7 MG/DL (ref 8.8–10.4)
CHLORIDE SERPL-SCNC: 103 MMOL/L (ref 98–107)
CHOLEST SERPL-MCNC: 225 MG/DL
CREAT SERPL-MCNC: 0.82 MG/DL (ref 0.51–0.95)
EGFRCR SERPLBLD CKD-EPI 2021: 77 ML/MIN/1.73M2
EOSINOPHIL # BLD AUTO: 0.1 10E3/UL (ref 0–0.7)
EOSINOPHIL NFR BLD AUTO: 1 %
ERYTHROCYTE [DISTWIDTH] IN BLOOD BY AUTOMATED COUNT: 12.9 % (ref 10–15)
FASTING STATUS PATIENT QL REPORTED: NO
FASTING STATUS PATIENT QL REPORTED: NO
GLUCOSE SERPL-MCNC: 122 MG/DL (ref 70–99)
HCO3 SERPL-SCNC: 25 MMOL/L (ref 22–29)
HCT VFR BLD AUTO: 49.4 % (ref 35–47)
HDLC SERPL-MCNC: 53 MG/DL
HGB BLD-MCNC: 16.3 G/DL (ref 11.7–15.7)
IMM GRANULOCYTES # BLD: 0 10E3/UL
IMM GRANULOCYTES NFR BLD: 0 %
LDLC SERPL CALC-MCNC: 152 MG/DL
LYMPHOCYTES # BLD AUTO: 2.2 10E3/UL (ref 0.8–5.3)
LYMPHOCYTES NFR BLD AUTO: 21 %
MCH RBC QN AUTO: 29.6 PG (ref 26.5–33)
MCHC RBC AUTO-ENTMCNC: 33 G/DL (ref 31.5–36.5)
MCV RBC AUTO: 90 FL (ref 78–100)
MONOCYTES # BLD AUTO: 0.5 10E3/UL (ref 0–1.3)
MONOCYTES NFR BLD AUTO: 5 %
NEUTROPHILS # BLD AUTO: 7.7 10E3/UL (ref 1.6–8.3)
NEUTROPHILS NFR BLD AUTO: 73 %
NONHDLC SERPL-MCNC: 172 MG/DL
PLATELET # BLD AUTO: 435 10E3/UL (ref 150–450)
POTASSIUM SERPL-SCNC: 4.4 MMOL/L (ref 3.4–5.3)
RBC # BLD AUTO: 5.5 10E6/UL (ref 3.8–5.2)
SODIUM SERPL-SCNC: 138 MMOL/L (ref 135–145)
TRIGL SERPL-MCNC: 102 MG/DL
WBC # BLD AUTO: 10.5 10E3/UL (ref 4–11)

## 2024-09-11 PROCEDURE — 82306 VITAMIN D 25 HYDROXY: CPT | Performed by: FAMILY MEDICINE

## 2024-09-11 PROCEDURE — 36415 COLL VENOUS BLD VENIPUNCTURE: CPT | Performed by: FAMILY MEDICINE

## 2024-09-11 PROCEDURE — 82728 ASSAY OF FERRITIN: CPT | Performed by: FAMILY MEDICINE

## 2024-09-11 PROCEDURE — 80048 BASIC METABOLIC PNL TOTAL CA: CPT | Performed by: FAMILY MEDICINE

## 2024-09-11 PROCEDURE — 80061 LIPID PANEL: CPT | Performed by: FAMILY MEDICINE

## 2024-09-11 PROCEDURE — 85025 COMPLETE CBC W/AUTO DIFF WBC: CPT | Performed by: FAMILY MEDICINE

## 2024-09-11 PROCEDURE — 99214 OFFICE O/P EST MOD 30 MIN: CPT | Performed by: FAMILY MEDICINE

## 2024-09-11 RX ORDER — IMIPRAMINE HCL 50 MG
TABLET ORAL
Qty: 90 TABLET | Refills: 3 | Status: SHIPPED | OUTPATIENT
Start: 2024-09-11

## 2024-09-11 RX ORDER — VALACYCLOVIR HYDROCHLORIDE 500 MG/1
500 TABLET, FILM COATED ORAL DAILY
Qty: 30 TABLET | Refills: 1 | Status: SHIPPED | OUTPATIENT
Start: 2024-09-11

## 2024-09-11 RX ORDER — SIMVASTATIN 20 MG
20 TABLET ORAL AT BEDTIME
Qty: 90 TABLET | Refills: 3 | Status: SHIPPED | OUTPATIENT
Start: 2024-09-11

## 2024-09-11 RX ORDER — NIFEDIPINE 30 MG
30 TABLET, EXTENDED RELEASE ORAL DAILY
Qty: 90 TABLET | Refills: 3 | Status: SHIPPED | OUTPATIENT
Start: 2024-09-11

## 2024-09-11 RX ORDER — METOPROLOL SUCCINATE 25 MG/1
TABLET, EXTENDED RELEASE ORAL
Qty: 90 TABLET | Refills: 3 | Status: SHIPPED | OUTPATIENT
Start: 2024-09-11

## 2024-09-11 RX ORDER — OXYBUTYNIN CHLORIDE 15 MG/1
TABLET, EXTENDED RELEASE ORAL
Qty: 180 TABLET | Refills: 3 | Status: CANCELLED | OUTPATIENT
Start: 2024-09-11

## 2024-09-11 ASSESSMENT — PATIENT HEALTH QUESTIONNAIRE - PHQ9
SUM OF ALL RESPONSES TO PHQ QUESTIONS 1-9: 0
SUM OF ALL RESPONSES TO PHQ QUESTIONS 1-9: 0
10. IF YOU CHECKED OFF ANY PROBLEMS, HOW DIFFICULT HAVE THESE PROBLEMS MADE IT FOR YOU TO DO YOUR WORK, TAKE CARE OF THINGS AT HOME, OR GET ALONG WITH OTHER PEOPLE: NOT DIFFICULT AT ALL

## 2024-09-11 NOTE — PROGRESS NOTES
Assessment & Plan     Mixed hyperlipidemia  - Lipid panel reflex to direct LDL Non-fasting  - simvastatin (ZOCOR) 20 MG tablet  Dispense: 90 tablet; Refill: 3  - Lipid panel reflex to direct LDL Non-fasting    Age-related osteoporosis without current pathological fracture  Updated labs today.  Recommend to continue alendronate  - Vitamin D Deficiency  - CBC with platelets and differential  - Basic metabolic panel  (Ca, Cl, CO2, Creat, Gluc, K, Na, BUN)  - Vitamin D Deficiency  - CBC with platelets and differential  - Basic metabolic panel  (Ca, Cl, CO2, Creat, Gluc, K, Na, BUN)    Urinary incontinence, unspecified type  - imipramine (TOFRANIL) 50 MG tablet  Dispense: 90 tablet; Refill: 3    Multiple sclerosis (H)  Working with her neurologist.  Updated parking permit  - imipramin(TOFRANIL) 50 MG tablet  Dispense: 90 tablet; Refill: 3    Tachycardia  - metoprolol succinate ER (TOPROL XL) 25 MG 24 hr tablet  Dispense: 90 tablet; Refill: 3    Hyperthyroidism  - metoprolol succinate ER (TOPROL XL) 25 MG 24 hr tablet  Dispense: 90 tablet; Refill: 3    Raynaud's syndrome without gangrene   - NIFEdipine ER (ADALAT CC) 30 MG 24 hr tablet  Dispense: 90 tablet; Refill: 3    Left corneal scar  Continues to see her optometrist  - valACYclovir (VALTREX) 500 MG tablet  Dispense: 30 tablet; Refill: 1    Encounter for screening mammogram for breast cancer    - MA Screen Bilateral w/Joe    Other fatigue    - CBC with platelets and differential  - CBC with platelets and differential  - Ferritin  - Ferritin    Elevated hemoglobin (H24)  - Ferritin  - Ferritin    Abnormal finding of blood chemistry, unspecified  - Ferritin  - Ferritin            Nicotine/Tobacco Cessation  She reports that she has been smoking cigarettes. She has a 45 pack-year smoking history. She has never used smokeless tobacco.  Nicotine/Tobacco Cessation Plan  Information offered: Patient not interested at this time            Heber Castaneda is a 69 year  "old, presenting for the following health issues:  No chief complaint on file.      Via the Health Maintenance questionnaire, the patient has reported the following services have been completed , this information has been sent to the abstraction team.  History of Present Illness       Reason for visit:  Medicine refill   She is taking medications regularly.         Hypertension Follow-up    Do you check your blood pressure regularly outside of the clinic? No   Are you following a low salt diet? No  Are your blood pressures ever more than 140 on the top number (systolic) OR more   than 90 on the bottom number (diastolic), for example 140/90? Unknown due to not checking    Doing well.  Needing refills of her medications.      Review of Systems  Constitutional, HEENT, cardiovascular, pulmonary, gi and gu systems are negative, except as otherwise noted.      Objective    /86 (BP Location: Right arm, Patient Position: Chair, Cuff Size: Adult Regular)   Pulse 94   Resp 16   Ht 1.778 m (5' 10\")   Wt 75.8 kg (167 lb)   SpO2 98%   BMI 23.96 kg/m    Body mass index is 23.96 kg/m .  Physical Exam  Constitutional:       Appearance: Normal appearance.   HENT:      Head: Normocephalic.      Right Ear: Tympanic membrane normal.      Left Ear: Tympanic membrane normal.   Eyes:      Conjunctiva/sclera: Conjunctivae normal.   Cardiovascular:      Rate and Rhythm: Normal rate and regular rhythm.      Pulses: Normal pulses.   Pulmonary:      Effort: Pulmonary effort is normal.   Abdominal:      General: Bowel sounds are normal.   Skin:     General: Skin is warm and dry.   Neurological:      Mental Status: She is alert.   Psychiatric:         Thought Content: Thought content normal.         Judgment: Judgment normal.            Signed Electronically by: BARRY AWAD DO    "

## 2024-09-12 LAB — VIT D+METAB SERPL-MCNC: 21 NG/ML (ref 20–50)

## 2024-09-13 LAB — FERRITIN SERPL-MCNC: 193 NG/ML (ref 11–328)

## 2024-09-13 RX ORDER — ALENDRONATE SODIUM 70 MG/1
70 TABLET ORAL
Qty: 13 TABLET | Refills: 3 | Status: SHIPPED | OUTPATIENT
Start: 2024-09-13

## 2024-10-12 ENCOUNTER — HOSPITAL ENCOUNTER (OUTPATIENT)
Dept: CT IMAGING | Facility: CLINIC | Age: 69
Discharge: HOME | End: 2024-10-12
Attending: INTERNAL MEDICINE | Admitting: INTERNAL MEDICINE
Payer: COMMERCIAL

## 2024-10-12 DIAGNOSIS — R91.8 PULMONARY NODULES: ICD-10-CM

## 2024-10-12 PROCEDURE — 71250 CT THORAX DX C-: CPT

## 2024-10-15 ENCOUNTER — TELEPHONE (OUTPATIENT)
Dept: FAMILY MEDICINE | Facility: CLINIC | Age: 69
End: 2024-10-15
Payer: COMMERCIAL

## 2024-10-15 DIAGNOSIS — G35 MULTIPLE SCLEROSIS (H): ICD-10-CM

## 2024-10-15 DIAGNOSIS — N31.9 NEUROGENIC BLADDER: Primary | ICD-10-CM

## 2024-10-15 DIAGNOSIS — Z78.9 SELF-CATHETERIZES URINARY BLADDER: ICD-10-CM

## 2024-10-15 DIAGNOSIS — R32 UNSPECIFIED URINARY INCONTINENCE: ICD-10-CM

## 2024-10-15 NOTE — TELEPHONE ENCOUNTER
Order/Referral Request    Who is requesting: Pt's spouse/caregiver     Orders being requested: 16fr catheters - Pt uses 8 per day.  Last order was from 7/26/22 (See Other Orders).  Fax order for # 720 catheters with refills to [facility] @ Fax# [phone].  No need to call patient's spouse back, unless there are questions or problems.      Reason service is needed/diagnosis: Incontinent, M.S.    When are orders needed by: ASAP    Has this been discussed with Provider: Yes    Does patient have a preference on a Group/Provider/Facility? [facility]    Does patient have an appointment scheduled?: No    Where to send orders: Fax [phone]    Could we [facility] this information to you [facility] or would you prefer to receive a phone call?:   Patient would prefer a phone call   Okay to leave a detailed message?: Yes at Cell number on file:    Telephone Information:   Mobile [phone]

## 2024-10-16 NOTE — TELEPHONE ENCOUNTER
Catheters ordered   Order faxed to [facility]  Confirmed in right fax    Amy Cortze RN on 10/16/2024 at 12:40 PM

## 2024-11-05 SDOH — HEALTH STABILITY: PHYSICAL HEALTH: ON AVERAGE, HOW MANY DAYS PER WEEK DO YOU ENGAGE IN MODERATE TO STRENUOUS EXERCISE (LIKE A BRISK WALK)?: 0 DAYS

## 2024-11-05 SDOH — HEALTH STABILITY: PHYSICAL HEALTH: ON AVERAGE, HOW MANY MINUTES DO YOU ENGAGE IN EXERCISE AT THIS LEVEL?: 0 MIN

## 2024-11-05 ASSESSMENT — SOCIAL DETERMINANTS OF HEALTH (SDOH): HOW OFTEN DO YOU GET TOGETHER WITH FRIENDS OR RELATIVES?: NEVER

## 2024-11-05 ASSESSMENT — PATIENT HEALTH QUESTIONNAIRE - PHQ9
10. IF YOU CHECKED OFF ANY PROBLEMS, HOW DIFFICULT HAVE THESE PROBLEMS MADE IT FOR YOU TO DO YOUR WORK, TAKE CARE OF THINGS AT HOME, OR GET ALONG WITH OTHER PEOPLE: NOT DIFFICULT AT ALL
SUM OF ALL RESPONSES TO PHQ QUESTIONS 1-9: 7
SUM OF ALL RESPONSES TO PHQ QUESTIONS 1-9: 7

## 2024-11-06 ENCOUNTER — ORDERS ONLY (AUTO-RELEASED) (OUTPATIENT)
Dept: FAMILY MEDICINE | Facility: CLINIC | Age: 69
End: 2024-11-06

## 2024-11-06 ENCOUNTER — OFFICE VISIT (OUTPATIENT)
Dept: FAMILY MEDICINE | Facility: CLINIC | Age: 69
End: 2024-11-06
Payer: COMMERCIAL

## 2024-11-06 VITALS
OXYGEN SATURATION: 94 % | WEIGHT: 167.8 LBS | TEMPERATURE: 98.4 F | BODY MASS INDEX: 24.02 KG/M2 | HEART RATE: 136 BPM | DIASTOLIC BLOOD PRESSURE: 70 MMHG | HEIGHT: 70 IN | RESPIRATION RATE: 16 BRPM | SYSTOLIC BLOOD PRESSURE: 108 MMHG

## 2024-11-06 DIAGNOSIS — M79.661 PAIN IN BOTH LOWER LEGS: Primary | ICD-10-CM

## 2024-11-06 DIAGNOSIS — I73.00 RAYNAUD'S SYNDROME WITHOUT GANGRENE: ICD-10-CM

## 2024-11-06 DIAGNOSIS — R00.0 TACHYCARDIA: ICD-10-CM

## 2024-11-06 DIAGNOSIS — G35 MULTIPLE SCLEROSIS (H): ICD-10-CM

## 2024-11-06 DIAGNOSIS — Z00.00 ENCOUNTER FOR MEDICARE ANNUAL WELLNESS EXAM: ICD-10-CM

## 2024-11-06 DIAGNOSIS — M79.662 PAIN IN BOTH LOWER LEGS: Primary | ICD-10-CM

## 2024-11-06 PROCEDURE — 99213 OFFICE O/P EST LOW 20 MIN: CPT | Mod: 25 | Performed by: FAMILY MEDICINE

## 2024-11-06 PROCEDURE — G0439 PPPS, SUBSEQ VISIT: HCPCS | Performed by: FAMILY MEDICINE

## 2024-11-06 NOTE — PATIENT INSTRUCTIONS
Shenopatch    Check BP/pulse at home    Increase metoprolol ?     Mammogram    Update vaccines     Patient Education   Preventive Care Advice   This is general advice given by our system to help you stay healthy. However, your care team may have specific advice just for you. Please talk to your care team about your preventive care needs.  Nutrition  Eat 5 or more servings of fruits and vegetables each day.  Try wheat bread, brown rice and whole grain pasta (instead of white bread, rice, and pasta).  Get enough calcium and vitamin D. Check the label on foods and aim for 100% of the RDA (recommended daily allowance).  Lifestyle  Exercise at least 150 minutes each week  (30 minutes a day, 5 days a week).  Do muscle strengthening activities 2 days a week. These help control your weight and prevent disease.  No smoking.  Wear sunscreen to prevent skin cancer.  Have a dental exam and cleaning every 6 months.  Yearly exams  See your health care team every year to talk about:  Any changes in your health.  Any medicines your care team has prescribed.  Preventive care, family planning, and ways to prevent chronic diseases.  Shots (vaccines)   HPV shots (up to age 26), if you've never had them before.  Hepatitis B shots (up to age 59), if you've never had them before.  COVID-19 shot: Get this shot when it's due.  Flu shot: Get a flu shot every year.  Tetanus shot: Get a tetanus shot every 10 years.  Pneumococcal, hepatitis A, and RSV shots: Ask your care team if you need these based on your risk.  Shingles shot (for age 50 and up)  General health tests  Diabetes screening:  Starting at age 35, Get screened for diabetes at least every 3 years.  If you are younger than age 35, ask your care team if you should be screened for diabetes.  Cholesterol test: At age 39, start having a cholesterol test every 5 years, or more often if advised.  Bone density scan (DEXA): At age 50, ask your care team if you should have this scan for  osteoporosis (brittle bones).  Hepatitis C: Get tested at least once in your life.  STIs (sexually transmitted infections)  Before age 24: Ask your care team if you should be screened for STIs.  After age 24: Get screened for STIs if you're at risk. You are at risk for STIs (including HIV) if:  You are sexually active with more than one person.  You don't use condoms every time.  You or a partner was diagnosed with a sexually transmitted infection.  If you are at risk for HIV, ask about PrEP medicine to prevent HIV.  Get tested for HIV at least once in your life, whether you are at risk for HIV or not.  Cancer screening tests  Cervical cancer screening: If you have a cervix, begin getting regular cervical cancer screening tests starting at age 21.  Breast cancer scan (mammogram): If you've ever had breasts, begin having regular mammograms starting at age 40. This is a scan to check for breast cancer.  Colon cancer screening: It is important to start screening for colon cancer at age 45.  Have a colonoscopy test every 10 years (or more often if you're at risk) Or, ask your provider about stool tests like a FIT test every year or Cologuard test every 3 years.  To learn more about your testing options, visit:   .  For help making a decision, visit:   https://bit.ly/jw24637.  Prostate cancer screening test: If you have a prostate, ask your care team if a prostate cancer screening test (PSA) at age 55 is right for you.  Lung cancer screening: If you are a current or former smoker ages 50 to 80, ask your care team if ongoing lung cancer screenings are right for you.  For informational purposes only. Not to replace the advice of your health care provider. Copyright   2023 [employer]. All rights reserved. Clinically reviewed by the [facility]. Eligible 032086 - REV 01/24.  Preventing Falls: Care Instructions  Injuries and health problems such as trouble walking or poor eyesight can  increase your risk of falling. So can some medicines. But there are things you can do to help prevent falls. You can exercise to get stronger. You can also arrange your home to make it safer.    Talk to your doctor about the medicines you take. Ask if any of them increase the risk of falls and whether they can be changed or stopped.   Try to exercise regularly. It can help improve your strength and balance. This can help lower your risk of falling.         Practice fall safety and prevention.   Wear low-heeled shoes that fit well and give your feet good support. Talk to your doctor if you have foot problems that make this hard.  Carry a cellphone or wear a medical alert device that you can use to call for help.  Use stepladders instead of chairs to reach high objects. Don't climb if you're at risk for falls. Ask for help, if needed.  Wear the correct eyeglasses, if you need them.        Make your home safer.   Remove rugs, cords, clutter, and furniture from walkways.  Keep your house well lit. Use night-lights in hallways and bathrooms.  Install and use sturdy handrails on stairways.  Wear nonskid footwear, even inside. Don't walk barefoot or in socks without shoes.        Be safe outside.   Use handrails, curb cuts, and ramps whenever possible.  Keep your hands free by using a shoulder bag or backpack.  Try to walk in well-lit areas. Watch out for uneven ground, changes in pavement, and debris.  Be careful in the winter. Walk on the grass or gravel when sidewalks are slippery. Use de-icer on steps and walkways. Add non-slip devices to shoes.    Put grab bars and nonskid mats in your shower or tub and near the toilet. Try to use a shower chair or bath bench when bathing.   Get into a tub or shower by putting in your weaker leg first. Get out with your strong side first. Have a phone or medical alert device in the bathroom with you.   Where can you learn more?  Go to https://www.healthwise.net/patiented  Enter N256  "in the search box to learn more about \"Preventing Falls: Care Instructions.\"  Current as of: July 17, 2023  Content Version: 14.2 2024 [employer].   Care instructions adapted under license by your healthcare professional. If you have questions about a medical condition or this instruction, always ask your healthcare professional. [employer]wise[employer] Incorporated disclaims any warranty or liability for your use of this information.    Learning About Depression Screening  What is depression screening?  Depression screening is a way to see if you have depression symptoms. It may be done by a doctor or counselor. It's often part of a routine checkup. That's because your mental health is just as important as your physical health.  Depression is a mental health condition that affects how you feel, think, and act. You may:  Have less energy.  Lose interest in your daily activities.  Feel sad and grouchy for a long time.  Depression is very common. It affects people of all ages.  Many things can lead to depression. Some people become depressed after they have a stroke or find out they have a major illness like cancer or heart disease. The death of a loved one or a breakup may lead to depression. It can run in families. Most experts believe that a combination of inherited genes and stressful life events can cause it.  What happens during screening?  You may be asked to fill out a form about your depression symptoms. You and the doctor will discuss your answers. The doctor may ask you more questions to learn more about how you think, act, and feel.  What happens after screening?  If you have symptoms of depression, your doctor will talk to you about your options.  Doctors usually treat depression with medicines or counseling. Often, combining the two works best. Many people don't get help because they think that they'll get over the depression on their own. But people with depression may not get better unless they get " "treatment.  The cause of depression is not well understood. There may be many factors involved. But if you have depression, it's not your fault.  A serious symptom of depression is thinking about death or suicide. If you or someone you care about talks about this or about feeling hopeless, get help right away.  It's important to know that depression can be treated. Medicine, counseling, and self-care may help.  Where can you learn more?  Go to https://www.T[employer].net/patiented  Enter T185 in the search box to learn more about \"Learning About Depression Screening.\"  Current as of: June 24, 2023  Content Version: 14.2 2024 [facility] [email or web address].   Care instructions adapted under license by your healthcare professional. If you have questions about a medical condition or this instruction, always ask your healthcare professional. Healthwise[employer] Incorporated disclaims any warranty or liability for your use of this information.       "

## 2024-11-06 NOTE — PROGRESS NOTES
Preventive Care Visit  [facility]  BARRY AWAD DO, Family Medicine  Nov 6, 2024  {Provider  Link to [facility] :668798}    Assessment & Plan     Encounter for Medicare annual wellness exam    Pain in both lower legs  HX of raynauds-positive PAD screen with normal WILBUR in 2022. Discussed with patient option to repeat WILBUR she does not want to currently. Continue max medical therapy    Multiple sclerosis (H)  Managed by her neurologist    Raynaud's syndrome without gangrene  On nifedipine    Tachycardia    - ZIO PATCH MAIL OUT      {Patient advised of split billing (Optional):161224}        Counseling  Appropriate preventive services were addressed with this patient via screening, questionnaire, or discussion as appropriate for fall prevention, nutrition, physical activity, Tobacco-use cessation, social engagement, weight loss and cognition.  Checklist reviewing preventive services available has been given to the patient.  Reviewed patient's diet, addressing concerns and/or questions.   Patient is at risk for social isolation and has been provided with information about the benefit of social connection.   The patient was instructed to see the dentist every 6 months.   The patient's PHQ-9 score is consistent with mild depression. She was provided with information regarding depression.       {FOLLOW UP PLANS (Optional) Includes COVID19 Treatment Plan:706577}    Heber Castaneda is a 69 year old, presenting for the following:  Wellness Visit        11/6/2024    12:42 PM   Additional Questions   Roomed by Monica KING MA   Accompanied by Self       HPI          Health Care Directive  Patient does not have a Health Care Directive:       11/5/2024   General Health   How would you rate your overall physical health? Good   Feel stress (tense, anxious, or unable to sleep) Not at all            11/5/2024   Nutrition   Diet: Regular (no restrictions)            11/5/2024   Exercise   Days per week of  moderate/strenous exercise 0 days   Average minutes spent exercising at this level 0 min      (!) EXERCISE CONCERN      11/5/2024   Social Factors   Frequency of gathering with friends or relatives Never   Worry food won't last until get money to buy more No   Food not last or not have enough money for food? No   Do you have housing? (Housing is defined as stable permanent housing and does not include staying ouside in a car, in a tent, in an abandoned building, in an overnight shelter, or couch-surfing.) Yes   Are you worried about losing your housing? No   Lack of transportation? No   Unable to get utilities (heat,electricity)? No      (!) SOCIAL CONNECTIONS CONCERN      11/6/2024   Fall Risk   Reason Gait Speed Test Not Completed Patient does not tolerate an upright or standing position (e.g. wheelchair)               11/5/2024   Activities of Daily Living- Home Safety   Needs help with the following daily activites None of the above   Safety concerns in the home None of the above            11/5/2024   Dental   Dentist two times every year? (!) NO            11/5/2024   Hearing Screening   Hearing concerns? None of the above            11/5/2024   Driving Risk Screening   Patient/family members have concerns about driving (!) DECLINE            11/5/2024   General Alertness/Fatigue Screening   Have you been more tired than usual lately? No            11/5/2024   Urinary Incontinence Screening   Bothered by leaking urine in past 6 months No            11/5/2024   TB Screening   Were you born outside of the [place]? No          Today's PHQ-9 Score:       11/5/2024     2:34 PM   PHQ-9 SCORE   PHQ-9 Total Score MyChart 7 (Mild depression)   PHQ-9 Total Score 7        Patient-reported         11/5/2024   Substance Use   Alcohol more than 3/day or more than 7/wk Not Applicable   Do you have a current opioid prescription? No   How severe/bad is pain from 1 to 10? 5/10   Do you use any other substances recreationally? No         Social History     Tobacco Use    Smoking status: Every Day     Current packs/day: 1.00     Average packs/day: 1 pack/day for 45.0 years (45.0 ttl pk-yrs)     Types: Cigarettes     Passive exposure: Never    Smokeless tobacco: Never   Vaping Use    Vaping status: Never Used   Substance Use Topics    Alcohol use: No    Drug use: No     {Provider  If there are gaps in the social history shown above, please follow the link to update and then refresh the note Link to Social and Substance History :895314}      1/6/2022   LAST FHS-7 RESULTS   1st degree relative breast or ovarian cancer No   Any relative bilateral breast cancer No   Any male have breast cancer No   Any ONE woman have BOTH breast AND ovarian cancer No   Any woman with breast cancer before 50yrs No   2 or more relatives with breast AND/OR ovarian cancer No   2 or more relatives with breast AND/OR bowel cancer No        Mammogram Screening - Mammogram every 1-2 years updated in Health Maintenance based on mutual decision making      History of abnormal Pap smear: { :677592}        Latest Ref Rng & Units 5/2/2019     1:50 PM 5/2/2019     1:36 PM 12/8/2016     2:17 PM   PAP / HPV   PAP (Historical)   NIL     HPV 16 DNA NEG^Negative Negative   Negative    HPV 18 DNA NEG^Negative Negative   Negative    Other HR HPV NEG^Negative Negative   Negative      ASCVD Risk   The 10-year ASCVD risk score (Cecilia RAE, et al., 2019) is: 10.7%    Values used to calculate the score:      Age: 69 years      Sex: Female      Is Non- : No      Diabetic: No      Tobacco smoker: Yes      Systolic Blood Pressure: 108 mmHg      Is BP treated: No      HDL Cholesterol: 53 mg/dL      Total Cholesterol: 225 mg/dL    {Link to Fracture Risk Assessment Tool (Optional):042136}    {Provider  REQUIRED FOR AWV Use the storyboard to review patient history, after sections have been marked as reviewed, refresh note to capture documentation:648367}  {Provider    REQUIRED AWV use this link to review and update sexual activity history  after section has been marked as reviewed, refresh note to capture documentation:066486}  Reviewed and updated as needed this visit by Provider                    {HISTORY OPTIONS (Optional):032764}  Current providers sharing in care for this patient include:  Patient Care Team:  Yohana Harrington DO as PCP - General (Family Medicine)  Kush Gutierrez MD as MD (Ophthalmology)  Diego Oscar MD as MD (Ophthalmology)  Devendra Tabares MD as MD (Neurology)  Devendra Tabares MD as Assigned Neuroscience Provider  Diego Figueroa MD as MD (Endocrinology, Diabetes, and Metabolism)  Marleen Acosta MD as Assigned Endocrinology Provider  Rayshawn Marrero MD as MD (Critical Care)  Rayshawn Marrero MD as Assigned Pulmonology Provider  Yohana Harrington DO as Assigned PCP    The following health maintenance items are reviewed in Epic and correct as of today:  Health Maintenance   Topic Date Due    Pneumococcal Vaccine: 65+ Years (1 of 2 - PCV) Never done    ZOSTER IMMUNIZATION (1 of 2) Never done    DTAP/TDAP/TD IMMUNIZATION (2 - Td or Tdap) 04/09/2018    ANNUAL REVIEW OF HM ORDERS  05/15/2024    INFLUENZA VACCINE (1) Never done    COVID-19 Vaccine (4 - 2024-25 season) 09/01/2024    PHQ-9  05/06/2025    MAMMO SCREENING  06/15/2025    NICOTINE/TOBACCO CESSATION COUNSELING Q 1 YR  09/11/2025    LIPID  09/11/2025    LUNG CANCER SCREENING  10/12/2025    MEDICARE ANNUAL WELLNESS VISIT  11/06/2025    FALL RISK ASSESSMENT  11/06/2025    ADVANCE CARE PLANNING  03/22/2027    GLUCOSE  09/11/2027    RSV VACCINE (1 - 1-dose 75+ series) 07/20/2030    DEXA  05/30/2038    HEPATITIS C SCREENING  Completed    DEPRESSION ACTION PLAN  Completed    MIGRAINE ACTION PLAN  Completed    HPV IMMUNIZATION  Aged Out    MENINGITIS IMMUNIZATION  Aged Out    RSV MONOCLONAL ANTIBODY  Aged Out    PAP  Discontinued    COLORECTAL CANCER SCREENING   "Discontinued       {ROS Picklists (Optional):582666}     Objective    Exam  /70 (BP Location: Right arm, Patient Position: Chair, Cuff Size: Adult Regular)   Pulse (!) 136   Temp 98.4  F (36.9  C)   Resp 16   Ht 1.778 m (5' 10\")   Wt 76.1 kg (167 lb 12.8 oz)   SpO2 94%   BMI 24.08 kg/m     Estimated body mass index is 24.08 kg/m  as calculated from the following:    Height as of this encounter: 1.778 m (5' 10\").    Weight as of this encounter: 76.1 kg (167 lb 12.8 oz).    Physical Exam  {Exam Choices (Optional):219846}  {Provider  The Mini-Cog is incomplete, use link to complete and refresh note Link to Mini-Cog :741773}      11/6/2024   Mini Cog   Mini-Cog Not Completed (choose reason) Patient declines        {A Mini-Cog total score of 0-2 suggests the possibility of dementia, score of 3-5 suggests no dementia:156462}         Signed Electronically by: BARRY AWAD DO  {Email feedback regarding this note to [email or web address]   :028108}  "

## 2024-11-18 ENCOUNTER — TELEPHONE (OUTPATIENT)
Dept: FAMILY MEDICINE | Facility: CLINIC | Age: 69
End: 2024-11-18
Payer: COMMERCIAL

## 2024-11-18 NOTE — TELEPHONE ENCOUNTER
Please send copy of what we received from [facility] to patient-if this isnt enough we can try appeal.    BARRY AWAD, DO

## 2024-11-18 NOTE — TELEPHONE ENCOUNTER
"Dr. Harrington rec'd a faxed letter from [facility] that states that pt's catheters are only \"partially approved.\"  LM for pt to make sure that she rec'd her catheters and that they were billed properly.  If not, does pt want Dr. Harrington to write an appeal letter?    Fax on [facility] desk, in case RN gets call back   "

## 2024-11-18 NOTE — TELEPHONE ENCOUNTER
Received call from Patient's Spouse.  States that patient did receive 2 shipments for catheters.  Has already thrown out the packing papers, does not know how many catheters were sent.  Does seem to be about as many as she usually gets  Spouse states that there has been no changes in quantity used by Patient.  Patient has not received a bill yet.  Patient's  states that the notification that they received from [facility] was that the number, quantity that was ordered by PCP has not been approved.  Patient using 1 cath every 4 hours.  Gaston Gil RN

## 2024-11-19 LAB — CV ZIO PRELIM RESULTS: NORMAL

## 2024-11-25 LAB — CV ZIO PRELIM RESULTS: NORMAL

## 2024-12-12 ENCOUNTER — PATIENT OUTREACH (OUTPATIENT)
Dept: CARE COORDINATION | Facility: CLINIC | Age: 69
End: 2024-12-12
Payer: COMMERCIAL

## 2025-02-24 NOTE — TELEPHONE ENCOUNTER
Order printed and faxed.  Anna Cox RN     GOAL: Patient will be CGA with functional transfer with use of rolling walker  in 4 weeks

## 2025-03-14 NOTE — TELEPHONE ENCOUNTER
New onset      Pt called and stated never got imipramine. Looks like was sent to correct pharmacy but wrong location. Called pharmacy and they were not able to find it anywhere to pull. They needed another order. I re-ordered medication to correct pharmacy and notified pt      Alexis Bryson RN

## 2025-03-17 DIAGNOSIS — G25.81 RESTLESS LEG: ICD-10-CM

## 2025-03-17 RX ORDER — GABAPENTIN 100 MG/1
200 CAPSULE ORAL AT BEDTIME
Qty: 180 CAPSULE | Refills: 0 | Status: SHIPPED | OUTPATIENT
Start: 2025-03-17

## 2025-07-14 DIAGNOSIS — M81.0 AGE-RELATED OSTEOPOROSIS WITHOUT CURRENT PATHOLOGICAL FRACTURE: ICD-10-CM

## 2025-07-17 RX ORDER — ALENDRONATE SODIUM 70 MG/1
TABLET ORAL
Qty: 12 TABLET | Refills: 3 | Status: SHIPPED | OUTPATIENT
Start: 2025-07-17

## 2025-08-17 ENCOUNTER — HEALTH MAINTENANCE LETTER (OUTPATIENT)
Age: 70
End: 2025-08-17

## 2025-08-27 ENCOUNTER — MEDICAL CORRESPONDENCE (OUTPATIENT)
Dept: HEALTH INFORMATION MANAGEMENT | Facility: CLINIC | Age: 70
End: 2025-08-27
Payer: COMMERCIAL

## (undated) DEVICE — LUBRICATING JELLY 4.25OZ

## (undated) DEVICE — DILATOR CERVICAL OS FINDER TAPER 2-4MMX21.5CM 1176

## (undated) DEVICE — DRSG TELFA 3X8" 1238

## (undated) DEVICE — TUBING SUCTION 12"X1/4" N612

## (undated) DEVICE — SOL NACL 0.9% IRRIG 1000ML BOTTLE 07138-09

## (undated) DEVICE — PAD FLOOR SURGISAFE

## (undated) DEVICE — SOL NACL 0.9% IRRIG 3000ML BAG 07972-08

## (undated) DEVICE — PAD PERI INDIV WRAP 11" 2022

## (undated) DEVICE — NDL SPINAL 22GA 3.5" QUINCKE 405181

## (undated) DEVICE — GOWN LG DISP 9515

## (undated) DEVICE — PACK LAPAROSCOPY/PELVISCOPY STD

## (undated) DEVICE — Device

## (undated) DEVICE — SUCTION CURETTE 3MM ENDOMETRIAL MX140

## (undated) DEVICE — SOL WATER IRRIG 1000ML BOTTLE 07139-09

## (undated) DEVICE — TUBING SYS AQUILEX BLUE INFLOW AQL-110 YLW OUTFLOW AQL-111

## (undated) DEVICE — GLOVE PROTEXIS MICRO 5.5  2D73PM55

## (undated) DEVICE — CATH INTERMITTENT CLEAN-CATH FEMALE 14FR 6" VINYL LF 420614

## (undated) DEVICE — GLOVE PROTEXIS BLUE W/NEU-THERA 6.0  2D73EB60

## (undated) DEVICE — DECANTER VIAL 2006S

## (undated) RX ORDER — FENTANYL CITRATE 50 UG/ML
INJECTION, SOLUTION INTRAMUSCULAR; INTRAVENOUS
Status: DISPENSED
Start: 2019-05-15

## (undated) RX ORDER — ACETAMINOPHEN 325 MG/1
TABLET ORAL
Status: DISPENSED
Start: 2019-05-15

## (undated) RX ORDER — LIDOCAINE HYDROCHLORIDE 10 MG/ML
INJECTION, SOLUTION INFILTRATION; PERINEURAL
Status: DISPENSED
Start: 2019-05-15